# Patient Record
Sex: FEMALE | Race: ASIAN | NOT HISPANIC OR LATINO | Employment: FULL TIME | URBAN - METROPOLITAN AREA
[De-identification: names, ages, dates, MRNs, and addresses within clinical notes are randomized per-mention and may not be internally consistent; named-entity substitution may affect disease eponyms.]

---

## 2017-01-03 ENCOUNTER — ANESTHESIA (OUTPATIENT)
Dept: OBGYN | Facility: CLINIC | Age: 42
End: 2017-01-03
Payer: COMMERCIAL

## 2017-01-03 ENCOUNTER — ANESTHESIA EVENT (OUTPATIENT)
Dept: OBGYN | Facility: CLINIC | Age: 42
End: 2017-01-03
Payer: COMMERCIAL

## 2017-01-03 ENCOUNTER — SURGERY (OUTPATIENT)
Age: 42
End: 2017-01-03

## 2017-01-03 PROBLEM — Z98.891 S/P C-SECTION: Status: ACTIVE | Noted: 2017-01-03

## 2017-01-03 PROBLEM — Z36.89 ENCOUNTER FOR TRIAGE IN PREGNANT PATIENT: Status: ACTIVE | Noted: 2017-01-03

## 2017-01-03 PROCEDURE — 25000125 ZZHC RX 250: Performed by: OBSTETRICS & GYNECOLOGY

## 2017-01-03 PROCEDURE — 25800025 ZZH RX 258: Performed by: OBSTETRICS & GYNECOLOGY

## 2017-01-03 PROCEDURE — 25000125 ZZHC RX 250: Performed by: NURSE ANESTHETIST, CERTIFIED REGISTERED

## 2017-01-03 RX ORDER — KETOROLAC TROMETHAMINE 30 MG/ML
INJECTION, SOLUTION INTRAMUSCULAR; INTRAVENOUS PRN
Status: DISCONTINUED | OUTPATIENT
Start: 2017-01-03 | End: 2017-01-03

## 2017-01-03 RX ORDER — MORPHINE SULFATE 1 MG/ML
INJECTION, SOLUTION EPIDURAL; INTRATHECAL; INTRAVENOUS PRN
Status: DISCONTINUED | OUTPATIENT
Start: 2017-01-03 | End: 2017-01-03

## 2017-01-03 RX ORDER — DEXAMETHASONE SODIUM PHOSPHATE 4 MG/ML
INJECTION, SOLUTION INTRA-ARTICULAR; INTRALESIONAL; INTRAMUSCULAR; INTRAVENOUS; SOFT TISSUE PRN
Status: DISCONTINUED | OUTPATIENT
Start: 2017-01-03 | End: 2017-01-03

## 2017-01-03 RX ORDER — EPHEDRINE SULFATE 50 MG/ML
INJECTION, SOLUTION INTRAVENOUS PRN
Status: DISCONTINUED | OUTPATIENT
Start: 2017-01-03 | End: 2017-01-03

## 2017-01-03 RX ORDER — BUPIVACAINE HYDROCHLORIDE 7.5 MG/ML
INJECTION, SOLUTION INTRASPINAL PRN
Status: DISCONTINUED | OUTPATIENT
Start: 2017-01-03 | End: 2017-01-03

## 2017-01-03 RX ORDER — GLYCOPYRROLATE 0.2 MG/ML
INJECTION, SOLUTION INTRAMUSCULAR; INTRAVENOUS PRN
Status: DISCONTINUED | OUTPATIENT
Start: 2017-01-03 | End: 2017-01-03

## 2017-01-03 RX ORDER — ONDANSETRON 2 MG/ML
INJECTION INTRAMUSCULAR; INTRAVENOUS PRN
Status: DISCONTINUED | OUTPATIENT
Start: 2017-01-03 | End: 2017-01-03

## 2017-01-03 RX ADMIN — SODIUM CHLORIDE 1200 ML: 900 IRRIGANT IRRIGATION at 09:56

## 2017-01-03 RX ADMIN — EPHEDRINE SULFATE 5 MG: 50 INJECTION INTRAMUSCULAR; INTRAVENOUS; SUBCUTANEOUS at 09:24

## 2017-01-03 RX ADMIN — MORPHINE SULFATE 0.2 MG: 1 INJECTION EPIDURAL; INTRATHECAL; INTRAVENOUS at 09:14

## 2017-01-03 RX ADMIN — SODIUM CHLORIDE 300 ML: 900 IRRIGANT IRRIGATION at 10:09

## 2017-01-03 RX ADMIN — SODIUM CHLORIDE, POTASSIUM CHLORIDE, SODIUM LACTATE AND CALCIUM CHLORIDE: 600; 310; 30; 20 INJECTION, SOLUTION INTRAVENOUS at 09:53

## 2017-01-03 RX ADMIN — CEFAZOLIN SODIUM 2 G: 2 INJECTION, SOLUTION INTRAVENOUS at 09:09

## 2017-01-03 RX ADMIN — PHENYLEPHRINE HYDROCHLORIDE 100 MCG: 10 INJECTION, SOLUTION INTRAMUSCULAR; INTRAVENOUS; SUBCUTANEOUS at 09:24

## 2017-01-03 RX ADMIN — KETOROLAC TROMETHAMINE 30 MG: 30 INJECTION, SOLUTION INTRAMUSCULAR at 10:16

## 2017-01-03 RX ADMIN — GLYCOPYRROLATE 0.2 MG: 0.2 INJECTION, SOLUTION INTRAMUSCULAR; INTRAVENOUS at 09:13

## 2017-01-03 RX ADMIN — ONDANSETRON 4 MG: 2 INJECTION INTRAMUSCULAR; INTRAVENOUS at 09:13

## 2017-01-03 RX ADMIN — SODIUM CHLORIDE, POTASSIUM CHLORIDE, SODIUM LACTATE AND CALCIUM CHLORIDE: 600; 310; 30; 20 INJECTION, SOLUTION INTRAVENOUS at 09:09

## 2017-01-03 RX ADMIN — OXYTOCIN-SODIUM CHLORIDE 0.9% IV SOLN 30 UNIT/500ML: 30-0.9/5 SOLUTION at 09:41

## 2017-01-03 RX ADMIN — DEXAMETHASONE SODIUM PHOSPHATE 4 MG: 4 INJECTION, SOLUTION INTRAMUSCULAR; INTRAVENOUS at 09:13

## 2017-01-03 RX ADMIN — BUPIVACAINE HYDROCHLORIDE IN DEXTROSE 13.5 MG: 7.5 INJECTION, SOLUTION SUBARACHNOID at 09:14

## 2017-01-03 ASSESSMENT — ENCOUNTER SYMPTOMS
SEIZURES: 0
DYSRHYTHMIAS: 0
STRIDOR: 0

## 2017-01-03 ASSESSMENT — LIFESTYLE VARIABLES: TOBACCO_USE: 0

## 2017-01-03 ASSESSMENT — COPD QUESTIONNAIRES: COPD: 0

## 2017-01-03 NOTE — ANESTHESIA PROCEDURE NOTES
Peripheral nerve/Neuraxial procedure note : intrathecal  Pre-Procedure  Performed by JOLLY RAWLS  Referred by RADHA  Location: OB      Pre-Anesthestic Checklist: patient identified, IV checked, site marked, risks and benefits discussed, informed consent, monitors and equipment checked, pre-op evaluation and at physician/surgeon's request    Timeout  Correct Patient: Yes   Correct Procedure: Yes   Correct Site: Yes   Correct Laterality: Yes and N/A   Correct Position: Yes   Site Marked: Yes, N/A   .   Procedure Documentation  ASA 2  .    Procedure:    Intrathecal.  Insertion Site:L3-4  (midline approach)      Patient Prep;mask, sterile gloves, povidone-iodine 7.5% surgical scrub, patient draped.  .  Needle: (). # of attempts: 1. # of redirects:. Spinal Needle: Janeth tip 3.5 in.  . . .     Assessment/Narrative  Paresthesias: No.  .  .  clear CSF fluid removed . Time Injected: 09:14

## 2017-01-03 NOTE — ANESTHESIA PREPROCEDURE EVALUATION
PAC NOTE:       ANESTHESIA PRE EVALUATION:  Anesthesia Evaluation     . Pt has had prior anesthetic. Type: Regional    No history of anesthetic complications     ROS/MED HX    ENT/Pulmonary:  - neg pulmonary ROS    (-) tobacco use, asthma, COPD, SANTHOSH risk factors and recent URI   Neurologic:  - neg neurologic ROS    (-) seizures and CVA   Cardiovascular:  - neg cardiovascular ROS      (-) hypertension, CAD, arrhythmias and valvular problems/murmurs   METS/Exercise Tolerance:     Hematologic: Comments: Lab Test        01/03/17     06/07/16     09/03/15      --          01/06/15                       0655          1020          0850           --           1649          WBC           --          8.2          5.0           --          16.5*         HGB          12.8         12.4         13.2           < >        11.7          MCV           --          91           91            --          97            PLT           --          349          367           --          167            < > = values in this interval not displayed.                  Lab Test        10/04/16     09/03/15     01/06/15     06/19/14                       1021          0850          1649          0839          NA            --          139           --           --           POTASSIUM     --          3.6           --           --           CHLORIDE      --          105           --           --           CO2           --          28            --           --           BUN           --          11            --           --           CR            --          0.58         0.91          --           ANIONGAP      --          6             --           --           CAROL           --          8.8           --           --           GLC          74           87            --          83             - neg hematologic  ROS       Musculoskeletal:  - neg musculoskeletal ROS       GI/Hepatic:     (+) GERD Asymptomatic on medication,      (-) hepatitis and  liver disease   Renal/Genitourinary:  - ROS Renal section negative       Endo: Comment: ?thyroid problem in PAST. - neg endo ROS    (-) Type I DM, Type II DM, thyroid disease, chronic steroid usage and obesity   Psychiatric:  - neg psychiatric ROS       Infectious Disease:  - neg infectious disease ROS       Malignancy:      - no malignancy   Other:    (+) Possibly pregnant              Physical Exam  Normal systems: cardiovascular, pulmonary and dental    Airway   Mallampati: II  TM distance: >3 FB  Neck ROM: full    Dental     Cardiovascular   Rhythm and rate: regular and normal  (-) no friction rub, no systolic click and no murmur    Pulmonary    breath sounds clear to auscultation(-) no rhonchi, no decreased breath sounds, no wheezes, no rales and no stridor             Anesthesia Plan      History & Physical Review  History and physical reviewed and following examination; no interval change.    ASA Status:  2 .    NPO Status:  > 8 hours    Plan for Spinal   PONV prophylaxis:  Ondansetron (or other 5HT-3) and Dexamethasone or Solumedrol       Postoperative Care  Postoperative pain management:  IV analgesics.      Consents  Anesthetic plan, risks, benefits and alternatives discussed with:  Patient or representative, Patient and Spouse..                            .

## 2017-01-03 NOTE — ANESTHESIA POSTPROCEDURE EVALUATION
Patient: Katie Wilson     SECTION (N/A Abdomen)  Additional InformationProcedure(s):  Repeat  Section  - Wound Class: I-Clean    Diagnosis:Previous    Diagnosis Additional Information: Previous  in labor     -desires repeat C/S     Anesthesia Type:  Spinal    Note:  Anesthesia Post Evaluation    Patient location during evaluation: Bedside  Patient participation: Able to participate in evaluation but full recovery from regional anesthesia has not yet ocurrred but is anticipated to occur within 48 hours  Level of consciousness: awake  Pain management: adequate  Airway patency: patent  Cardiovascular status: acceptable  Respiratory status: acceptable  Hydration status: acceptable  PONV: controlled     Anesthetic complications: None          Last vitals:  Filed Vitals:    17 0607   BP: 122/73   Temp: 97.9  F (36.6  C)       Electronically Signed By: Arnie Morgan MD  January 3, 2017  10:43 AM

## 2017-01-03 NOTE — ANESTHESIA CARE TRANSFER NOTE
Patient: Katie Wilson     SECTION (N/A Abdomen)  Additional InformationProcedure(s):  Repeat  Section  - Wound Class: I-Clean    Diagnosis: Previous    Diagnosis Additional Information: No value filed.    Anesthesia Type:   Spinal     Note:  Airway :Room Air  Patient transferred to:Labor and Delivery  Comments: VSS, report to RN.      Vitals: (Last set prior to Anesthesia Care Transfer)              Electronically Signed By: CORIE Sheets CRNA  January 3, 2017  10:31 AM

## 2017-01-23 ENCOUNTER — OFFICE VISIT (OUTPATIENT)
Dept: OBGYN | Facility: CLINIC | Age: 42
End: 2017-01-23
Payer: COMMERCIAL

## 2017-01-23 ENCOUNTER — TELEPHONE (OUTPATIENT)
Dept: FAMILY MEDICINE | Facility: CLINIC | Age: 42
End: 2017-01-23

## 2017-01-23 VITALS
TEMPERATURE: 98.2 F | WEIGHT: 124.7 LBS | DIASTOLIC BLOOD PRESSURE: 76 MMHG | HEART RATE: 82 BPM | HEIGHT: 59 IN | OXYGEN SATURATION: 97 % | BODY MASS INDEX: 25.14 KG/M2 | SYSTOLIC BLOOD PRESSURE: 110 MMHG

## 2017-01-23 DIAGNOSIS — Z98.891 S/P CESAREAN SECTION: ICD-10-CM

## 2017-01-23 DIAGNOSIS — K64.4 EXTERNAL HEMORRHOIDS: Primary | ICD-10-CM

## 2017-01-23 PROCEDURE — 99024 POSTOP FOLLOW-UP VISIT: CPT | Performed by: FAMILY MEDICINE

## 2017-01-23 NOTE — PATIENT INSTRUCTIONS
Return in 4 weeks       Dr. Kylie Thomas,     Obstetrics and Gynecology  [facility] [place]

## 2017-01-23 NOTE — NURSING NOTE
"Chief Complaint   Patient presents with     Surgical Followup      pain        Initial /76 mmHg  Pulse 82  Temp(Src) 98.2  F (36.8  C) (Oral)  Ht 4' 11\" (1.499 m)  Wt 124 lb 11.2 oz (56.564 kg)  BMI 25.17 kg/m2  SpO2 97%  LMP 2016  Breastfeeding? Yes Estimated body mass index is 25.17 kg/(m^2) as calculated from the following:    Height as of this encounter: 4' 11\" (1.499 m).    Weight as of this encounter: 124 lb 11.2 oz (56.564 kg).  BP completed using cuff size: regular right arm   KAITLYNN Zuniga      "

## 2017-01-23 NOTE — TELEPHONE ENCOUNTER
" calling-wife present in background answering questions and states she is having pain from  on 1/3/17.   states they were told to call if post-op pain and could be worked in for OV.  Katie is concerned \"something was left in\".  They feel a lump right above right side of incision skin internally.  Is painful when she moves or touches it.  Noticed this 4-5 days ago.  Pain is a 3 when touches and a 1 normally.  Please advise.  Call them back at [phone].  Gretta Flower RN      "

## 2017-01-23 NOTE — MR AVS SNAPSHOT
After Visit Summary   1/23/2017    Katie Wilson    MRN: [id number]           Patient Information     Date Of Birth          1975        Visit Information        Provider Department      1/23/2017 1:15 PM Kylie Thomas, DO [facility]        Today's Diagnoses     External hemorrhoids    -  1       Care Instructions    Return in 4 weeks       Dr. Kylie Thomas, DO    Obstetrics and Gynecology  [facility]               Follow-ups after your visit        Who to contact     If you have questions or need follow up information about today's clinic visit or your schedule please contact [facility] directly at [phone].  Normal or non-critical lab and imaging results will be communicated to you by [employer], letter or phone within 4 business days after the clinic has received the results. If you do not hear from us within 7 days, please contact the clinic through [employer] or phone. If you have a critical or abnormal lab result, we will notify you by phone as soon as possible.  Submit refill requests through [employer] or call your pharmacy and they will forward the refill request to us. Please allow 3 business days for your refill to be completed.          Additional Information About Your Visit        MyChart Information     [employer] gives you secure access to your electronic health record. If you see a primary care provider, you can also send messages to your care team and make appointments. If you have questions, please call your primary care clinic.  If you do not have a primary care provider, please call [phone] and they will assist you.        Care EveryWhere ID     This is your Care EveryWhere ID. This could be used by other organizations to access your [place] medical records  [id number]        Your Vitals Were     Pulse Temperature Height BMI (Body Mass Index) Pulse Oximetry Last Period    82 98.2  F (36.8  C) (Oral) 4'  "11\" (1.499 m) 25.17 kg/m2 97% 04/07/2016    Breastfeeding?                   Yes            Blood Pressure from Last 3 Encounters:   01/23/17 110/76   01/06/17 119/75   12/20/16 108/62    Weight from Last 3 Encounters:   01/23/17 124 lb 11.2 oz (56.564 kg)   12/20/16 135 lb 9.6 oz (61.508 kg)   12/05/16 135 lb 4.8 oz (61.372 kg)              Today, you had the following     No orders found for display         Today's Medication Changes          These changes are accurate as of: 1/23/17  1:36 PM.  If you have any questions, ask your nurse or doctor.               Start taking these medicines.        Dose/Directions    hydrocortisone 2.5 % cream   Commonly known as:  ANUSOL-HC   Used for:  External hemorrhoids   Started by:  Kylie Thomas, DO        Place rectally 2 times daily   Quantity:  70 g   Refills:  1            Where to get your medicines      These medications were sent to [facility] [address] [place] [id number]    Hours:  24-hours Phone:  [phone]    - hydrocortisone 2.5 % cream             Primary Care Provider Office Phone # Fax #    Ya Bay [phone]       [facility] [address] [place] [address]        Thank you!     Thank you for choosing [facility]  for your care. Our goal is always to provide you with excellent care. Hearing back from our patients is one way we can continue to improve our services. Please take a few minutes to complete the written survey that you may receive in the mail after your visit with us. Thank you!             Your Updated Medication List - Protect others around you: Learn how to safely use, store and throw away your medicines at www.disposemymeds.org.          This list is accurate as of: 1/23/17  1:36 PM.  Always use your most recent med list.                   Brand Name Dispense Instructions " for use    hydrocortisone 2.5 % cream    ANUSOL-HC    70 g    Place rectally 2 times daily       ibuprofen 600 MG tablet    ADVIL/MOTRIN    30 tablet    Take 1 tablet (600 mg) by mouth every 6 hours as needed for other (cramping)       PRENAT VIT-FEPOLY-METHYLFOL-FA PO          senna-docusate 8.6-50 MG per tablet    SENOKOT-S;PERICOLACE    100 tablet    Take 1-2 tablets by mouth 2 times daily

## 2017-01-23 NOTE — PROGRESS NOTES
Subjective: 41 year old female   status post  on 1/3/17, here for incision check.  Doing well, denies fever, significant pain.  Is not taking pain medications.      She feels painful mobile lump on right side of abdomen. Is not taking any pain medications, continues prenatal vitamin and stool softener.     Notes hemorrhoid, requests examination.     This document serves as a record of the services and decisions personally performed and made by Kylie Thomas DO. It was created on her behalf by Deisi Jones, a trained medical scribe. The creation of this document is based the provider's statements to the medical scribe.  Deisi Jones 2017 1:28 PM    Objective:  EXAM:  LMP 2016  Constitutional: healthy, alert and no distress  Gastrointestinal: Abdomen soft, non-tender. Incision intact, no erthema, drainage. Normal healing, ridge of tissue palpable.   Rectum: Large hemorrhoid.    Assessment/Plan: 41 year old female   status post  on 1/3/17.  V67.00C Surgery Follow-Up Examination  (primary encounter diagnosis)  Comment: Patient feeling nerve pain from healing process.   Plan: Add ibuprofen or tylenol for pain management.   Hemorrhoid cream. Discussed surgical options if not reduced.   Return to clinic in 4 weeks for post-partum visit.     The information in this document, created by the medical scribe for me, accurately reflects the services I personally performed and the decisions made by me. I have reviewed and approved this document for accuracy prior to leaving the patient care area.  Dr. Kylie Thomas DO    OB/GYN

## 2017-05-30 ENCOUNTER — OFFICE VISIT (OUTPATIENT)
Dept: INTERNAL MEDICINE | Facility: CLINIC | Age: 42
End: 2017-05-30
Payer: COMMERCIAL

## 2017-05-30 VITALS
TEMPERATURE: 97.9 F | SYSTOLIC BLOOD PRESSURE: 116 MMHG | WEIGHT: 115 LBS | BODY MASS INDEX: 23.23 KG/M2 | OXYGEN SATURATION: 98 % | DIASTOLIC BLOOD PRESSURE: 80 MMHG | HEART RATE: 82 BPM

## 2017-05-30 DIAGNOSIS — L65.9 HAIR LOSS: Primary | ICD-10-CM

## 2017-05-30 LAB
ALBUMIN SERPL-MCNC: 3.9 G/DL (ref 3.4–5)
ALP SERPL-CCNC: 108 U/L (ref 40–150)
ALT SERPL W P-5'-P-CCNC: 29 U/L (ref 0–50)
ANION GAP SERPL CALCULATED.3IONS-SCNC: 6 MMOL/L (ref 3–14)
AST SERPL W P-5'-P-CCNC: 22 U/L (ref 0–45)
BASOPHILS # BLD AUTO: 0 10E9/L (ref 0–0.2)
BASOPHILS NFR BLD AUTO: 0.1 %
BILIRUB SERPL-MCNC: 0.4 MG/DL (ref 0.2–1.3)
BUN SERPL-MCNC: 10 MG/DL (ref 7–30)
CALCIUM SERPL-MCNC: 9.3 MG/DL (ref 8.5–10.1)
CHLORIDE SERPL-SCNC: 106 MMOL/L (ref 94–109)
CO2 SERPL-SCNC: 28 MMOL/L (ref 20–32)
CREAT SERPL-MCNC: 0.54 MG/DL (ref 0.52–1.04)
DIFFERENTIAL METHOD BLD: NORMAL
EOSINOPHIL # BLD AUTO: 0.2 10E9/L (ref 0–0.7)
EOSINOPHIL NFR BLD AUTO: 2.3 %
ERYTHROCYTE [DISTWIDTH] IN BLOOD BY AUTOMATED COUNT: 12 % (ref 10–15)
GFR SERPL CREATININE-BSD FRML MDRD: NORMAL ML/MIN/1.7M2
GLUCOSE SERPL-MCNC: 78 MG/DL (ref 70–99)
HCT VFR BLD AUTO: 38.9 % (ref 35–47)
HGB BLD-MCNC: 12.9 G/DL (ref 11.7–15.7)
LYMPHOCYTES # BLD AUTO: 1.2 10E9/L (ref 0.8–5.3)
LYMPHOCYTES NFR BLD AUTO: 17.8 %
MCH RBC QN AUTO: 30.2 PG (ref 26.5–33)
MCHC RBC AUTO-ENTMCNC: 33.2 G/DL (ref 31.5–36.5)
MCV RBC AUTO: 91 FL (ref 78–100)
MONOCYTES # BLD AUTO: 0.6 10E9/L (ref 0–1.3)
MONOCYTES NFR BLD AUTO: 8.7 %
NEUTROPHILS # BLD AUTO: 5 10E9/L (ref 1.6–8.3)
NEUTROPHILS NFR BLD AUTO: 71.1 %
PLATELET # BLD AUTO: 346 10E9/L (ref 150–450)
POTASSIUM SERPL-SCNC: 4.1 MMOL/L (ref 3.4–5.3)
PROT SERPL-MCNC: 8 G/DL (ref 6.8–8.8)
RBC # BLD AUTO: 4.27 10E12/L (ref 3.8–5.2)
SODIUM SERPL-SCNC: 140 MMOL/L (ref 133–144)
T4 FREE SERPL-MCNC: 0.97 NG/DL (ref 0.76–1.46)
TSH SERPL DL<=0.005 MIU/L-ACNC: 0.36 MU/L (ref 0.4–4)
WBC # BLD AUTO: 7 10E9/L (ref 4–11)

## 2017-05-30 PROCEDURE — 99213 OFFICE O/P EST LOW 20 MIN: CPT | Performed by: FAMILY MEDICINE

## 2017-05-30 PROCEDURE — 84439 ASSAY OF FREE THYROXINE: CPT | Performed by: FAMILY MEDICINE

## 2017-05-30 PROCEDURE — 80050 GENERAL HEALTH PANEL: CPT | Performed by: FAMILY MEDICINE

## 2017-05-30 PROCEDURE — 36415 COLL VENOUS BLD VENIPUNCTURE: CPT | Performed by: FAMILY MEDICINE

## 2017-05-30 NOTE — PROGRESS NOTES
CHIEF COMPLAINT    Hair loss.      HISTORY    Katie has notices excessive hair loss for about a month. This can occur from all over the scalp but seems to be thinning more in front bilaterally.    She is about 5 months post partum after delivery of child number 2. She did not have hair loss after baby #1.    There is mention in chart of a thyroid disorder.    Her only medication is Vitamin.    She is not a smoker.    Patient Active Problem List   Diagnosis     Lump of right breast     Thyroid disorder     Herpes simplex virus (HSV) infection     History of /currently pregnant     AMA (advanced maternal age) multigravida 35+     Encounter for triage in pregnant patient     S/P        Current Outpatient Prescriptions   Medication Sig Dispense Refill     PRENAT VIT-FEPOLY-METHYLFOL-FA PO        hydrocortisone (ANUSOL-HC) 2.5 % cream Place rectally 2 times daily (Patient not taking: Reported on 2017) 70 g 1     ibuprofen (ADVIL/MOTRIN) 600 MG tablet Take 1 tablet (600 mg) by mouth every 6 hours as needed for other (cramping) (Patient not taking: Reported on 2017) 30 tablet 0     senna-docusate (SENOKOT-S;PERICOLACE) 8.6-50 MG per tablet Take 1-2 tablets by mouth 2 times daily (Patient not taking: Reported on 2017) 100 tablet        REVIEW OF SYSTEMS    No wt gain  No heat or cold intolerance.  No skin rash      Past Medical History:   Diagnosis Date     Abnormal Pap smear      Herpes simplex without mention of complication      Thyroid disorder     as a child unknown if hyper or hypo       EXAM  /80 (BP Location: Right arm, Patient Position: Chair, Cuff Size: Adult Regular)  Pulse 82  Temp 97.9  F (36.6  C) (Oral)  Wt 115 lb (52.2 kg)  LMP 2017  SpO2 98%  Breastfeeding? Yes  BMI 23.23 kg/m2    She has loose strand just running fingers through hair.  Hair thinning in bifrontal regions.  Neck: no thyromegaly      (L65.9) Hair loss  (primary encounter  diagnosis)  Comment:   Plan: CBC with platelets and differential,         Comprehensive metabolic panel (BMP + Alb, Alk         Phos, ALT, AST, Total. Bili, TP), TSH with free        T4 reflex, DERMATOLOGY REFERRAL

## 2017-05-30 NOTE — MR AVS SNAPSHOT
After Visit Summary   5/30/2017    Katie Wilson    MRN: [id number]           Patient Information     Date Of Birth          1975        Visit Information        Provider Department      5/30/2017 9:20 AM Julio César Leon MD [facility]        Today's Diagnoses     Hair loss    -  1       Follow-ups after your visit        Additional Services     DERMATOLOGY REFERRAL       Your provider has referred you to: [facility] Horne [phone]  [email or web address]    Please be aware that coverage of these services is subject to the terms and limitations of your health insurance plan.  Call member services at your health plan with any benefit or coverage questions.      Please bring the following with you to your appointment:    (1) Any X-Rays, CTs or MRIs which have been performed.  Contact the facility where they were done to arrange for  prior to your scheduled appointment.    (2) List of current medications  (3) This referral request   (4) Any documents/labs given to you for this referral                  Who to contact     If you have questions or need follow up information about today's clinic visit or your schedule please contact [facility] directly at [phone].  Normal or non-critical lab and imaging results will be communicated to you by MyChart, letter or phone within 4 business days after the clinic has received the results. If you do not hear from us within 7 days, please contact the clinic through MyChart or phone. If you have a critical or abnormal lab result, we will notify you by phone as soon as possible.  Submit refill requests through [employer] or call your pharmacy and they will forward the refill request to us. Please allow 3 business days for your refill to be completed.          Additional Information About Your Visit        MyChart Information     [employer] gives you secure access to  your electronic health record. If you see a primary care provider, you can also send messages to your care team and make appointments. If you have questions, please call your primary care clinic.  If you do not have a primary care provider, please call [phone] and they will assist you.        Care EveryWhere ID     This is your Care EveryWhere ID. This could be used by other organizations to access your [place] medical records  [id number]        Your Vitals Were     Pulse Temperature Last Period Pulse Oximetry Breastfeeding? BMI (Body Mass Index)    82 97.9  F (36.6  C) (Oral) 04/19/2017 98% Yes 23.23 kg/m2       Blood Pressure from Last 3 Encounters:   05/30/17 116/80   01/23/17 110/76   01/06/17 119/75    Weight from Last 3 Encounters:   05/30/17 115 lb (52.2 kg)   01/23/17 124 lb 11.2 oz (56.6 kg)   12/20/16 135 lb 9.6 oz (61.5 kg)              We Performed the Following     CBC with platelets and differential     Comprehensive metabolic panel (BMP + Alb, Alk Phos, ALT, AST, Total. Bili, TP)     DERMATOLOGY REFERRAL     TSH with free T4 reflex        Primary Care Provider Office Phone # Fax #    Ya [facility] [phone]       [facility] [address] [facility] [address]        Thank you!     Thank you for choosing [facility]  for your care. Our goal is always to provide you with excellent care. Hearing back from our patients is one way we can continue to improve our services. Please take a few minutes to complete the written survey that you may receive in the mail after your visit with us. Thank you!             Your Updated Medication List - Protect others around you: Learn how to safely use, store and throw away your medicines at www.disposemymeds.org.          This list is accurate as of: 5/30/17  9:58 AM.  Always use your most recent med list.                   Brand Name Dispense Instructions for use    hydrocortisone 2.5 % cream     ANUSOL-HC    70 g    Place rectally 2 times daily       ibuprofen 600 MG tablet    ADVIL/MOTRIN    30 tablet    Take 1 tablet (600 mg) by mouth every 6 hours as needed for other (cramping)       PRENAT VIT-FEPOLY-METHYLFOL-FA PO          senna-docusate 8.6-50 MG per tablet    SENOKOT-S;PERICOLACE    100 tablet    Take 1-2 tablets by mouth 2 times daily

## 2017-05-30 NOTE — NURSING NOTE
"Chief Complaint   Patient presents with     Hair Loss     x's 1mo. Happened after haircut       Initial There were no vitals taken for this visit. Estimated body mass index is 25.19 kg/(m^2) as calculated from the following:    Height as of 1/23/17: 4' 11\" (1.499 m).    Weight as of 1/23/17: 124 lb 11.2 oz (56.6 kg).  Medication Reconciliation: celine Du CMA      "

## 2017-06-23 ENCOUNTER — TRANSFERRED RECORDS (OUTPATIENT)
Dept: HEALTH INFORMATION MANAGEMENT | Facility: CLINIC | Age: 42
End: 2017-06-23

## 2017-07-19 ENCOUNTER — NURSE TRIAGE (OUTPATIENT)
Dept: NURSING | Facility: CLINIC | Age: 42
End: 2017-07-19

## 2017-07-20 NOTE — TELEPHONE ENCOUNTER
Reason for Disposition    Caller has URGENT medication question about med that PCP prescribed and triager unable to answer question    Protocols used: MEDICATION QUESTION CALL-ADULT-AH  Having dental surgery and needs to know if medications they will use are ok for her nursing infant. Paged Dr. KRYS Philippe via answering service to [phone].  Glory Lundberg RN  [facility]

## 2017-09-01 ENCOUNTER — TELEPHONE (OUTPATIENT)
Dept: FAMILY MEDICINE | Facility: CLINIC | Age: 42
End: 2017-09-01

## 2017-09-01 NOTE — TELEPHONE ENCOUNTER
Panel Management Review      Patient has the following on her problem list: None      Composite cancer screening  Chart review shows that this patient is due/due soon for the following Pap Smear  Summary:    Patient is due/failing the following:   PAP    Action needed:   Patient needs office visit for pap.    Type of outreach:    Phone, left message for patient to call back.     Questions for provider review:    None                                                                                                                                    KAITLYNN Zuniga       Chart routed to  .

## 2017-09-11 NOTE — TELEPHONE ENCOUNTER
2nd attempt. Spoke to pt and she is going to call back and schedule after talking with ??.  Deisi Dai SARAHY

## 2017-11-17 ENCOUNTER — OFFICE VISIT (OUTPATIENT)
Dept: INTERNAL MEDICINE | Facility: CLINIC | Age: 42
End: 2017-11-17
Payer: COMMERCIAL

## 2017-11-17 VITALS
OXYGEN SATURATION: 97 % | HEART RATE: 77 BPM | TEMPERATURE: 98.3 F | HEIGHT: 59 IN | WEIGHT: 119 LBS | BODY MASS INDEX: 23.99 KG/M2 | DIASTOLIC BLOOD PRESSURE: 68 MMHG | SYSTOLIC BLOOD PRESSURE: 110 MMHG

## 2017-11-17 DIAGNOSIS — Z00.00 ENCOUNTER FOR ROUTINE ADULT HEALTH EXAMINATION WITHOUT ABNORMAL FINDINGS: Primary | ICD-10-CM

## 2017-11-17 LAB
BASOPHILS # BLD AUTO: 0 10E9/L (ref 0–0.2)
BASOPHILS NFR BLD AUTO: 0.2 %
DIFFERENTIAL METHOD BLD: NORMAL
EOSINOPHIL # BLD AUTO: 0.4 10E9/L (ref 0–0.7)
EOSINOPHIL NFR BLD AUTO: 6.4 %
ERYTHROCYTE [DISTWIDTH] IN BLOOD BY AUTOMATED COUNT: 12 % (ref 10–15)
HCT VFR BLD AUTO: 40.1 % (ref 35–47)
HGB BLD-MCNC: 13.4 G/DL (ref 11.7–15.7)
LYMPHOCYTES # BLD AUTO: 1.3 10E9/L (ref 0.8–5.3)
LYMPHOCYTES NFR BLD AUTO: 21.6 %
MCH RBC QN AUTO: 30.7 PG (ref 26.5–33)
MCHC RBC AUTO-ENTMCNC: 33.4 G/DL (ref 31.5–36.5)
MCV RBC AUTO: 92 FL (ref 78–100)
MONOCYTES # BLD AUTO: 0.4 10E9/L (ref 0–1.3)
MONOCYTES NFR BLD AUTO: 6.4 %
NEUTROPHILS # BLD AUTO: 4 10E9/L (ref 1.6–8.3)
NEUTROPHILS NFR BLD AUTO: 65.4 %
PLATELET # BLD AUTO: 365 10E9/L (ref 150–450)
RBC # BLD AUTO: 4.36 10E12/L (ref 3.8–5.2)
WBC # BLD AUTO: 6.1 10E9/L (ref 4–11)

## 2017-11-17 PROCEDURE — 87624 HPV HI-RISK TYP POOLED RSLT: CPT | Performed by: INTERNAL MEDICINE

## 2017-11-17 PROCEDURE — 80061 LIPID PANEL: CPT | Performed by: INTERNAL MEDICINE

## 2017-11-17 PROCEDURE — G0145 SCR C/V CYTO,THINLAYER,RESCR: HCPCS | Performed by: INTERNAL MEDICINE

## 2017-11-17 PROCEDURE — 99396 PREV VISIT EST AGE 40-64: CPT | Performed by: INTERNAL MEDICINE

## 2017-11-17 PROCEDURE — 36415 COLL VENOUS BLD VENIPUNCTURE: CPT | Performed by: INTERNAL MEDICINE

## 2017-11-17 PROCEDURE — 80050 GENERAL HEALTH PANEL: CPT | Performed by: INTERNAL MEDICINE

## 2017-11-17 NOTE — NURSING NOTE
"Chief Complaint   Patient presents with     Physical     PAP-on 4th day of period     Forms     biometric       Initial /68 (BP Location: Left arm, Cuff Size: Adult Regular)  Pulse 77  Temp 98.3  F (36.8  C) (Oral)  Ht 4' 11\" (1.499 m)  Wt 119 lb (54 kg)  LMP 11/14/2017  SpO2 97%  Breastfeeding? No  BMI 24.04 kg/m2 Estimated body mass index is 24.04 kg/(m^2) as calculated from the following:    Height as of this encounter: 4' 11\" (1.499 m).    Weight as of this encounter: 119 lb (54 kg).  Medication Reconciliation: complete   Maryann Mark CMA      "

## 2017-11-17 NOTE — PROGRESS NOTES
SUBJECTIVE:   CC: Katie Wilson is an 42 year old woman who presents for preventive health visit.     Physical   Annual:     Getting at least 3 servings of Calcium per day::  Yes    Bi-annual eye exam::  Yes    Dental care twice a year::  Yes    Sleep apnea or symptoms of sleep apnea::  None    Diet::  Regular (no restrictions)    Frequency of exercise::  None    Taking medications regularly::  Yes    Medication side effects::  None    Additional concerns today::  No          Today's PHQ-2 Score: PHQ-2 ( 1999 Pf[employer]) 11/15/2017   Q1: Little interest or pleasure in doing things 0   Q2: Feeling down, depressed or hopeless 1   PHQ-2 Score 1   Q1: Little interest or pleasure in doing things Not at all   Q2: Feeling down, depressed or hopeless Several days   PHQ-2 Score 1       Abuse: Current or Past(Physical, Sexual or Emotional)- No  Do you feel safe in your environment - Yes    Social History   Substance Use Topics     Smoking status: Never Smoker     Smokeless tobacco: Never Used     Alcohol use No     The patient does not drink >3 drinks per day nor >7 drinks per week.    Reviewed orders with patient.  Reviewed health maintenance and updated orders accordingly - Yes  Labs reviewed in EPIC    Patient under age 50, mutual decision reflected in health maintenance.        Pertinent mammograms are reviewed under the imaging tab.  History of abnormal Pap smear: NO - age 30- 65 PAP every 3 years recommended    [facility]         Reviewed and updated as needed this visit by Provider          Review of Systems  C: NEGATIVE for fever, chills, change in weight  I: NEGATIVE for worrisome rashes, moles or lesions  E: NEGATIVE for vision changes or irritation  ENT: NEGATIVE for ear, mouth and throat problems  R: NEGATIVE for significant cough or SOB  B: NEGATIVE for masses, tenderness or discharge  CV: NEGATIVE for chest pain, palpitations or peripheral  "edema  GI: NEGATIVE for nausea, abdominal pain, heartburn, or change in bowel habits  : NEGATIVE for unusual urinary or vaginal symptoms. Periods are regular.  M: NEGATIVE for significant arthralgias or myalgia  N: NEGATIVE for weakness, dizziness or paresthesias  P: NEGATIVE for changes in mood or affect     OBJECTIVE:   There were no vitals taken for this visit.   /68 (BP Location: Left arm, Cuff Size: Adult Regular)  Pulse 77  Temp 98.3  F (36.8  C) (Oral)  Ht 4' 11\" (1.499 m)  Wt 119 lb (54 kg)  LMP 11/14/2017  SpO2 97%  Breastfeeding? No  BMI 24.04 kg/m2    Physical Exam  GENERAL: healthy, alert and no distress  EYES: Eyes grossly normal to inspection, PERRL and conjunctivae and sclerae normal  HENT: ear canals and TM's normal, nose and mouth without ulcers or lesions  NECK: no adenopathy, no asymmetry, masses, or scars and thyroid normal to palpation  RESP: lungs clear to auscultation - no rales, rhonchi or wheezes  BREAST: normal without masses, tenderness or nipple discharge and no palpable axillary masses or adenopathy  CV: regular rate and rhythm, normal S1 S2, no S3 or S4, no murmur, click or rub, no peripheral edema and peripheral pulses strong  ABDOMEN: soft, nontender, no hepatosplenomegaly, no masses and bowel sounds normal  Pelvic exam: normal vagina and vulva, normal cervix without lesions or tenderness, uterus normal size anteverted, adenxa normal in size without tenderness, pap smear done  MS: no gross musculoskeletal defects noted, no edema  SKIN: no suspicious lesions or rashes  NEURO: Normal strength and tone, mentation intact and speech normal  PSYCH: mentation appears normal, affect normal/bright    ASSESSMENT/PLAN:       ICD-10-CM    1. Encounter for routine adult health examination without abnormal findings Z00.00 Comprehensive metabolic panel     CBC with platelets differential     TSH with free T4 reflex     Lipid panel reflex to direct LDL Fasting     Pap imaged thin " "layer screen with HPV - recommended age 30 - 65 years (select HPV order below)     HPV High Risk Types DNA Cervical       COUNSELING:  Reviewed preventive health counseling, as reflected in patient instructions         reports that she has never smoked. She has never used smokeless tobacco.    Estimated body mass index is 23.23 kg/(m^2) as calculated from the following:    Height as of 1/23/17: 4' 11\" (1.499 m).    Weight as of 5/30/17: 115 lb (52.2 kg).         Counseling Resources:  ATP IV Guidelines  Pooled Cohorts Equation Calculator  Breast Cancer Risk Calculator  FRAX Risk Assessment  ICSI Preventive Guidelines  Dietary Guidelines for Americans, 2010  USDA's MyPlate  ASA Prophylaxis  Lung CA Screening    Ya Bay MD  [facility] for HPI/ROS submitted by the patient on 11/15/2017   PHQ-2 Score: 1    "

## 2017-11-17 NOTE — MR AVS SNAPSHOT
"              After Visit Summary   11/17/2017    Katie Wilson    MRN: [id number]           Patient Information     Date Of Birth          1975        Visit Information        Provider Department      11/17/2017 8:40 AM Ya Bay MD [facility]        Today's Diagnoses     Encounter for routine adult health examination without abnormal findings    -  1       Follow-ups after your visit        Who to contact     If you have questions or need follow up information about today's clinic visit or your schedule please contact [facility] directly at [phone].  Normal or non-critical lab and imaging results will be communicated to you by [employer], letter or phone within 4 business days after the clinic has received the results. If you do not hear from us within 7 days, please contact the clinic through [employer] or phone. If you have a critical or abnormal lab result, we will notify you by phone as soon as possible.  Submit refill requests through [employer] or call your pharmacy and they will forward the refill request to us. Please allow 3 business days for your refill to be completed.          Additional Information About Your Visit        MyChart Information     [employer] gives you secure access to your electronic health record. If you see a primary care provider, you can also send messages to your care team and make appointments. If you have questions, please call your primary care clinic.  If you do not have a primary care provider, please call [phone] and they will assist you.        Care EveryWhere ID     This is your Care EveryWhere ID. This could be used by other organizations to access your [place] medical records  [id number]        Your Vitals Were     Pulse Temperature Height Last Period Pulse Oximetry Breastfeeding?    77 98.3  F (36.8  C) (Oral) 4' 11\" (1.499 m) 11/14/2017 97% No    BMI (Body Mass Index)                   24.04 kg/m2            Blood " Pressure from Last 3 Encounters:   11/17/17 110/68   05/30/17 116/80   01/23/17 110/76    Weight from Last 3 Encounters:   11/17/17 119 lb (54 kg)   05/30/17 115 lb (52.2 kg)   01/23/17 124 lb 11.2 oz (56.6 kg)              We Performed the Following     CBC with platelets differential     Comprehensive metabolic panel     HPV High Risk Types DNA Cervical     Lipid panel reflex to direct LDL Fasting     Pap imaged thin layer screen with HPV - recommended age 30 - 65 years (select HPV order below)     TSH with free T4 reflex        Primary Care Provider Office Phone # Fax #    Ya Bay [phone]       [address] [facility] [address]        Equal Access to Services     GONSALO ROD : Hadii aad ku hadasho Sokate, waaxda luqadaha, qaybta kaalmada adeegyada, demarco diego. So [facility] [phone].    ATENCIÓN: Si habla español, tiene a dunbar disposición servicios gratuitos de asistencia lingüística. Llame al [phone].    We comply with applicable federal civil rights laws and [place] laws. We do not discriminate on the basis of race, color, national origin, age, disability, sex, sexual orientation, or gender identity.            Thank you!     Thank you for choosing [facility]  for your care. Our goal is always to provide you with excellent care. Hearing back from our patients is one way we can continue to improve our services. Please take a few minutes to complete the written survey that you may receive in the mail after your visit with us. Thank you!             Your Updated Medication List - Protect others around you: Learn how to safely use, store and throw away your medicines at www.disposemymeds.org.          This list is accurate as of: 11/17/17  9:24 AM.  Always use your most recent med list.                   Brand Name Dispense Instructions for use Diagnosis    PRENAT VIT-FEPOLY-METHYLFOL-FA PO

## 2017-11-19 LAB
ALBUMIN SERPL-MCNC: 3.8 G/DL (ref 3.4–5)
ALP SERPL-CCNC: 85 U/L (ref 40–150)
ALT SERPL W P-5'-P-CCNC: 19 U/L (ref 0–50)
ANION GAP SERPL CALCULATED.3IONS-SCNC: 5 MMOL/L (ref 3–14)
AST SERPL W P-5'-P-CCNC: 17 U/L (ref 0–45)
BILIRUB SERPL-MCNC: 0.3 MG/DL (ref 0.2–1.3)
BUN SERPL-MCNC: 12 MG/DL (ref 7–30)
CALCIUM SERPL-MCNC: 8.6 MG/DL (ref 8.5–10.1)
CHLORIDE SERPL-SCNC: 106 MMOL/L (ref 94–109)
CHOLEST SERPL-MCNC: 183 MG/DL
CO2 SERPL-SCNC: 28 MMOL/L (ref 20–32)
CREAT SERPL-MCNC: 0.6 MG/DL (ref 0.52–1.04)
GFR SERPL CREATININE-BSD FRML MDRD: >90 ML/MIN/1.7M2
GLUCOSE SERPL-MCNC: 88 MG/DL (ref 70–99)
HDLC SERPL-MCNC: 50 MG/DL
LDLC SERPL CALC-MCNC: 106 MG/DL
NONHDLC SERPL-MCNC: 133 MG/DL
POTASSIUM SERPL-SCNC: 3.9 MMOL/L (ref 3.4–5.3)
PROT SERPL-MCNC: 8.2 G/DL (ref 6.8–8.8)
SODIUM SERPL-SCNC: 139 MMOL/L (ref 133–144)
TRIGL SERPL-MCNC: 137 MG/DL
TSH SERPL DL<=0.005 MIU/L-ACNC: 0.63 MU/L (ref 0.4–4)

## 2017-11-21 LAB
COPATH REPORT: NORMAL
PAP: NORMAL

## 2017-11-24 LAB
FINAL DIAGNOSIS: NORMAL
HPV HR 12 DNA CVX QL NAA+PROBE: NEGATIVE
HPV16 DNA SPEC QL NAA+PROBE: NEGATIVE
HPV18 DNA SPEC QL NAA+PROBE: NEGATIVE
SPECIMEN DESCRIPTION: NORMAL

## 2017-12-05 ENCOUNTER — TELEPHONE (OUTPATIENT)
Dept: FAMILY MEDICINE | Facility: CLINIC | Age: 42
End: 2017-12-05

## 2017-12-05 NOTE — TELEPHONE ENCOUNTER
Panel Management Review      Patient has the following on her problem list: None      Composite cancer screening  Chart review shows that this patient is due/due soon for the following Pap Smear  Summary:    Patient is due/failing the following:   PAP    Action needed:   Patient needs office visit for physical pap.    Type of outreach:    Phone, left message for patient to call back.     Questions for provider review:    None                                                                                                                                    Dario Russo [facility]           Chart routed to Care Team .

## 2017-12-05 NOTE — LETTER
[facility]   [address] [place] [address]  [phone]      December 19, 2017    Katie Wilson  [address] [facility] [address]            Dear Katie,    This letter is to inform you that an appointment is needed for your pap smear.  Please call to schedule an appointment within the next day(s) to discuss. We look forward to serving you with any medical needs or questions.      Sincerely,        Tanya Infante MD/Yasmin Rodriguez

## 2018-05-29 ENCOUNTER — OFFICE VISIT (OUTPATIENT)
Dept: FAMILY MEDICINE | Facility: CLINIC | Age: 43
End: 2018-05-29
Payer: COMMERCIAL

## 2018-05-29 VITALS
OXYGEN SATURATION: 97 % | DIASTOLIC BLOOD PRESSURE: 74 MMHG | HEART RATE: 74 BPM | BODY MASS INDEX: 24.39 KG/M2 | HEIGHT: 59 IN | TEMPERATURE: 98.1 F | RESPIRATION RATE: 16 BRPM | SYSTOLIC BLOOD PRESSURE: 109 MMHG | WEIGHT: 121 LBS

## 2018-05-29 DIAGNOSIS — H10.33 ACUTE BACTERIAL CONJUNCTIVITIS OF BOTH EYES: Primary | ICD-10-CM

## 2018-05-29 PROCEDURE — 99213 OFFICE O/P EST LOW 20 MIN: CPT | Performed by: FAMILY MEDICINE

## 2018-05-29 RX ORDER — SULFACETAMIDE SODIUM 100 MG/ML
1 SOLUTION/ DROPS OPHTHALMIC
Qty: 1 BOTTLE | Refills: 0 | Status: SHIPPED | OUTPATIENT
Start: 2018-05-29 | End: 2018-06-05

## 2018-05-29 NOTE — MR AVS SNAPSHOT
"              After Visit Summary   5/29/2018    Katie Wilson    MRN: [id number]           Patient Information     Date Of Birth          1975        Visit Information        Provider Department      5/29/2018 8:45 AM Parker Almaguer MD [facility]        Today's Diagnoses     Acute bacterial conjunctivitis of both eyes    -  1       Follow-ups after your visit        Follow-up notes from your care team     Return in about 3 days (around 6/1/2018), or if symptoms worsen or fail to improve.      Who to contact     If you have questions or need follow up information about today's clinic visit or your schedule please contact [facility] directly at [phone].  Normal or non-critical lab and imaging results will be communicated to you by MyChart, letter or phone within 4 business days after the clinic has received the results. If you do not hear from us within 7 days, please contact the clinic through [employer]hart or phone. If you have a critical or abnormal lab result, we will notify you by phone as soon as possible.  Submit refill requests through [employer] or call your pharmacy and they will forward the refill request to us. Please allow 3 business days for your refill to be completed.          Additional Information About Your Visit        MyChart Information     [employer] gives you secure access to your electronic health record. If you see a primary care provider, you can also send messages to your care team and make appointments. If you have questions, please call your primary care clinic.  If you do not have a primary care provider, please call [phone] and they will assist you.        Care EveryWhere ID     This is your Care EveryWhere ID. This could be used by other organizations to access your [place] medical records  [id number]        Your Vitals Were     Pulse Temperature Respirations Height Pulse Oximetry BMI (Body Mass Index)    74 98.1  F (36.7  C) (Oral) 16 4' 11\" " (1.499 m) 97% 24.44 kg/m2       Blood Pressure from Last 3 Encounters:   05/29/18 109/74   11/17/17 110/68   05/30/17 116/80    Weight from Last 3 Encounters:   05/29/18 121 lb (54.9 kg)   11/17/17 119 lb (54 kg)   05/30/17 115 lb (52.2 kg)              Today, you had the following     No orders found for display         Today's Medication Changes          These changes are accurate as of 5/29/18  9:05 AM.  If you have any questions, ask your nurse or doctor.               Start taking these medicines.        Dose/Directions    sulfacetamide 10 % ophthalmic solution   Commonly known as:  BLEPH-10   Used for:  Acute bacterial conjunctivitis of both eyes   Started by:  Parker Almaguer MD        Dose:  1 drop   Apply 1 drop to eye every 4 hours (while awake) for 7 days   Quantity:  1 Bottle   Refills:  0            Where to get your medicines      These medications were sent to [facility] [address]  [address] [facility] [address]     Phone:  [phone]     sulfacetamide 10 % ophthalmic solution                Primary Care Provider Office Phone # Fax #    Ya Bubba Bay [phone]       [address]  [facility] [address]        Equal Access to Services     GONSALO ROD AH: Hadii judith ku hadasho Soomaali, waaxda luqadaha, qaybta kaalmada adeegyada, waxay idiin haydeidran brandi diego. So [facility] [phone].    ATENCIÓN: Si habla español, tiene a dunbar disposición servicios gratuitos de asistencia lingüística. Llame al [phone].    We comply with applicable federal civil rights laws and [place] laws. We do not discriminate on the basis of race, color, national origin, age, disability, sex, sexual orientation, or gender identity.            Thank you!     Thank you for choosing [facility]  for your care. Our goal is always to provide you with excellent care. Hearing back from our patients is one way we can continue to  improve our services. Please take a few minutes to complete the written survey that you may receive in the mail after your visit with us. Thank you!             Your Updated Medication List - Protect others around you: Learn how to safely use, store and throw away your medicines at www.disposemymeds.org.          This list is accurate as of 5/29/18  9:05 AM.  Always use your most recent med list.                   Brand Name Dispense Instructions for use Diagnosis    PRENAT VIT-FEPOLY-METHYLFOL-FA PO           sulfacetamide 10 % ophthalmic solution    BLEPH-10    1 Bottle    Apply 1 drop to eye every 4 hours (while awake) for 7 days    Acute bacterial conjunctivitis of both eyes

## 2018-05-29 NOTE — PROGRESS NOTES
"  SUBJECTIVE:   Katie Wilson is a 42 year old female who presents to clinic today for the following health issues:      Eye(s) Problem  Onset: 2 days    Description:   Location: bilateral  Pain: no  Redness: YES    Accompanying Signs & Symptoms:  Discharge/mattering: YES  Swelling: YES  Visual changes: YES  Fever: no  Nasal Congestion: no  Bothered by bright lights: no    History:   Trauma: no   Foreign body exposure: no    Precipitating factors:   Wearing contacts: no    Alleviating factors:  Improved by: none    Therapies Tried and outcome: none.        Problem list and histories reviewed & adjusted, as indicated.  Additional history: as documented    Patient Active Problem List   Diagnosis     Lump of right breast     Thyroid disorder     Herpes simplex virus (HSV) infection     History of /currently pregnant     AMA (advanced maternal age) multigravida 35+     Encounter for triage in pregnant patient     S/P      Past Surgical History:   Procedure Laterality Date      SECTION N/A 2015    Procedure:  SECTION;  Surgeon: Yohana Monroy MD;  Location: [facility] L+D      SECTION N/A 1/3/2017    Procedure:  SECTION;  Surgeon: Jerzy Camacho MD;  Location: [facility] L+D       Social History   Substance Use Topics     Smoking status: Never Smoker     Smokeless tobacco: Never Used     Alcohol use No     Family History   Problem Relation Age of Onset     CEREBROVASCULAR DISEASE Father      Hypertension Mother            Reviewed and updated as needed this visit by clinical staff  Tobacco  Allergies  Meds  Med Hx  Surg Hx  Fam Hx  Soc Hx      Reviewed and updated as needed this visit by Provider         ROS:      OBJECTIVE:     /74 (BP Location: Right arm, Patient Position: Chair, Cuff Size: Adult Regular)  Pulse 74  Temp 98.1  F (36.7  C) (Oral)  Resp 16  Ht 4' 11\" (1.499 m)  Wt 121 lb (54.9 kg)  SpO2 97%  BMI 24.44 kg/m2  Body mass index is 24.44 " kg/(m^2).  GENERAL: healthy, alert and no distress  EYES: PERRL, EOMI and conjunctiva/corneas- conjunctival injection OU        ASSESSMENT/PLAN:             1. Acute bacterial conjunctivitis of both eyes  [place]   - sulfacetamide (BLEPH-10) 10 % ophthalmic solution; Apply 1 drop to eye every 4 hours (while awake) for 7 days  Dispense: 1 Bottle; Refill: 0    Follow up in 5 days if symptoms persist, sooner if symptoms worsen or new ones develops, pt may contact us over the phone for any questions or concerns.      Parker Almaguer MD  [facility]

## 2018-08-30 ENCOUNTER — OFFICE VISIT (OUTPATIENT)
Dept: BEHAVIORAL HEALTH | Facility: CLINIC | Age: 43
End: 2018-08-30
Payer: COMMERCIAL

## 2018-08-30 DIAGNOSIS — F41.1 GAD (GENERALIZED ANXIETY DISORDER): ICD-10-CM

## 2018-08-30 DIAGNOSIS — F33.1 MODERATE EPISODE OF RECURRENT MAJOR DEPRESSIVE DISORDER (H): ICD-10-CM

## 2018-08-30 PROCEDURE — 90791 PSYCH DIAGNOSTIC EVALUATION: CPT | Performed by: MARRIAGE & FAMILY THERAPIST

## 2018-08-30 ASSESSMENT — ANXIETY QUESTIONNAIRES
GAD7 TOTAL SCORE: 9
3. WORRYING TOO MUCH ABOUT DIFFERENT THINGS: NOT AT ALL
7. FEELING AFRAID AS IF SOMETHING AWFUL MIGHT HAPPEN: NOT AT ALL
GAD7 TOTAL SCORE: 9
2. NOT BEING ABLE TO STOP OR CONTROL WORRYING: MORE THAN HALF THE DAYS
1. FEELING NERVOUS, ANXIOUS, OR ON EDGE: NOT AT ALL
4. TROUBLE RELAXING: NEARLY EVERY DAY
5. BEING SO RESTLESS THAT IT IS HARD TO SIT STILL: MORE THAN HALF THE DAYS
7. FEELING AFRAID AS IF SOMETHING AWFUL MIGHT HAPPEN: NOT AT ALL
GAD7 TOTAL SCORE: 9
6. BECOMING EASILY ANNOYED OR IRRITABLE: MORE THAN HALF THE DAYS

## 2018-08-30 ASSESSMENT — PATIENT HEALTH QUESTIONNAIRE - PHQ9
SUM OF ALL RESPONSES TO PHQ QUESTIONS 1-9: 14
10. IF YOU CHECKED OFF ANY PROBLEMS, HOW DIFFICULT HAVE THESE PROBLEMS MADE IT FOR YOU TO DO YOUR WORK, TAKE CARE OF THINGS AT HOME, OR GET ALONG WITH OTHER PEOPLE: SOMEWHAT DIFFICULT
SUM OF ALL RESPONSES TO PHQ QUESTIONS 1-9: 14

## 2018-08-30 NOTE — PROGRESS NOTES
[facility]  Integrated Behavioral Health Services   Diagnostic Assessment      PATIENT'S NAME: Katie Wilson  MRN:   [id number]  :   1975  DATE OF SERVICE: 2018  SERVICE LOCATION: Face to Face in Clinic  Visit Activities: NEW and [facility] Only      Identifying Information:  Patient is a 43 year old year old, [place],  female.  Patient attended the session alone.        Referral:  Patient was referred for an assessment by Dr. Harris at [facility].   Reason for referral: clarify behavioral health diagnosis, determine behavioral health treatment options, assess client readiness and motivation to change, assess client social situation, address the interaction of behavioral and medical issues and consultation on complex comorbid conditions.       Patient's Statement of Presenting Concern:  Patient reports the following reason(s) for seeking an assessment at this time: increased stress with parenting.  Patient stated that her symptoms have resulted in the following functional impairments: childcare / parenting, management of the household and or completion of tasks and relationship(s)      History of Presenting Concern:  Patient reports that these problem(s) began  when she  a man that she never meet and struggling with communication and parenting. She states that she she is still struggling with parenting, marriage and living in a different culture.Patient has attempted to resolve these concerns in the past through: counseling and primary care behavioral health provider. Patient reports that other professional(s) are involved in providing support / services. PCP      Social History:  Patient reported she grew up in [facility]. They were the fourth born of [address] [place] [address].  Patient reported that her childhood was good. She grew up very poor. Father passed away in High School.  Patient reported a history of 1 committed relationships or  marriages. Patient has been  for 4 years. Patient reported having 2 children. Patient identified few stable and meaningful social connections.     Patient lives with  and two children.  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  Work history has worked for [employer]    Patient reported that she has not been involved with the legal system.  Patient's highest education level was college graduate. Patient did not identify any learning problems. There are no ethnic, cultural or Rastafarian factors that may be relevant for therapy.  Patient did not serve in the .       Mental Health History:  Patient reported no family history of mental health issues. Patient has received the following mental health services in the past: counseling. Patient is not currently receiving any mental health services.      Chemical Health History:  Patient reported no family history of chemical health issues. Patient has not received chemical dependency treatment in the past. Patient is not currently receiving any chemical dependency treatment. Patient reports no problems as a result of their drinking / drug use.      Cage-AID score is: 0 Based on Cage-Aid score and clinical interview there  are not indications of drug or alcohol abuse.      Discussed the general effects of drugs and alcohol on health and well-being.      Significant Losses / Trauma / Abuse / Neglect Issues:  There are indications or report of significant loss, trauma, abuse or neglect issues related to: death of of father.    Issues of possible neglect are not present.      Medical History:   Patient Active Problem List   Diagnosis     Lump of right breast     Thyroid disorder     Herpes simplex virus (HSV) infection     History of /currently pregnant     AMA (advanced maternal age) multigravida 35+     Encounter for triage in pregnant patient     S/P        Medication Review:  Current Outpatient Prescriptions   Medication     PRENAT  VIT-FEPOLY-METHYLFOL-FA PO     No current facility-administered medications for this visit.      Facility-Administered Medications Ordered in Other Visits   Medication     bupivacaine (MARCAINE) 0.125 % injection (diluted from stock concentration by MD or CRNA)     lidocaine-EPINEPHrine 1.5 %-[id number] injection       Patient was provided recommendation to follow-up with physician.    Mental Status Assessment:  Appearance:   Appropriate   Eye Contact:   Good   Psychomotor Behavior: Normal   Attitude:   Cooperative   Orientation:   All  Speech   Rate / Production: Normal    Volume:  Normal   Mood:    Anxious  Sad   Affect:    Appropriate   Thought Content:  Clear   Thought Form:  Coherent  Logical   Insight:    Good       Safety Assessment:    Patient denies a history of suicidal ideation, suicide attempts, self-injurious behavior, homicidal ideation, homicidal behavior and and other safety concerns  Patient denies current or recent suicidal ideation or behaviors.  Patient denies current or recent homicidal ideation or behaviors.  Patient denies current or recent self injurious behavior or ideation.  Patient denies other safety concerns.  Patient reports there are firearms in the house. The firearms are secured in a locked space  Protective Factors Children in the home , Sense of responsibility to family and Positive social support   Risk Factors Intense guilt      Plan for Safety and Risk Management:  A safety and risk management plan has not been developed at this time, however patient was encouraged to call [facility] should there be a change in any of these risk factors.      Review of Symptoms:  Depression: Sleep Interest Guilt Energy Concentration Appetite  Maria Luisa:  No symptoms  Psychosis: No symptoms  Anxiety: Worries Nervousness  Panic:  No symptoms  Post Traumatic Stress Disorder: No symptoms  Obsessive Compulsive Disorder: No symptoms  Eating Disorder: No symptoms  Oppositional Defiant Disorder: No  symptoms  ADD / ADHD: No symptoms  Conduct Disorder: No symptoms    Patient's Strengths and Limitations:  Patient identified the following strengths or resources that will help her succeed in counseling: commitment to health and well being, family support and intelligence. Patient identified the following supports: family. Things that may interfere with the patien'ts success in behavioral health services include:lack of social support.    Diagnostic Criteria:  A. Excessive anxiety and worry about a number of events or activities (such as work or school performance).   B. The person finds it difficult to control the worry.  C. Select 3 or more symptoms (required for diagnosis). Only one item is required in children.   - Restlessness or feeling keyed up or on edge.    - Being easily fatigued.    - Difficulty concentrating or mind going blank.    - Irritability.    - Muscle tension.    - Sleep disturbance (difficulty falling or staying asleep, or restless unsatisfying sleep).   D. The focus of the anxiety and worry is not confined to features of an Axis I disorder.  E. The anxiety, worry, or physical symptoms cause clinically significant distress or impairment in social, occupational, or other important areas of functioning.   F. The disturbance is not due to the direct physiological effects of a substance (e.g., a drug of abuse, a medication) or a general medical condition (e.g., hyperthyroidism) and does not occur exclusively during a Mood Disorder, a Psychotic Disorder, or a Pervasive Developmental Disorder.   - Diminished interest or pleasure in all, or almost all, activities.    - Significant weight gainincrease in appetite.    - Decreased sleep.    - Fatigue or loss of energy.    - Feelings of worthlessness or inappropriate and excessive guilt.   B) The symptoms cause clinically significant distress or impairment in social, occupational, or other important areas of functioning  C) The episode is not attributable  to the physiological effects of a substance or to another medical condition  D) The occurence of major depressive episode is not better explained by other thought / psychotic disorders  E) There has never been a manic episode or hypomanic episode      Functional Status:  Patient's symptoms are causing reduced functional status in the following areas: Activities of Daily Living - getting things down  Social / Relational - kids and       DSM5 Diagnoses: (Sustained by DSM5 Criteria Listed Above)  Diagnoses: 296.22 (F32.1)  Major Depressive Disorder, Single Episode, Moderate _ and With anxious distress  300.02 (F41.1) Generalized Anxiety Disorder  Psychosocial & Contextual Factors: relationships, social  WHODAS Score: 22  See Media section of EPIC medical record for completed WHODAS    Preliminary Treatment Plan:    The client reports no currently identified Advent, ethnic or cultural issues relevant to therapy.    Initial Treatment will focus on: Depressed Mood - increase coping skills  Anxiety - increase coping skills.    Chemical dependency recommendations: No indications of CD issues    As a preliminary treatment goal, patient will experience a reduction in depressed mood, will develop more effective coping skills to manage depressive symptoms, will develop healthy cognitive patterns and beliefs and will increase ability to function adaptively and will experience a reduction in anxiety, will develop more effective coping skills to manage anxiety symptoms, will develop healthy cognitive patterns and beliefs and will increase ability to function adaptively.    The focus of initial interventions will be to alleviate anxiety and alleviate depressed mood.    The patient is receiving treatment / structured support from the following professional(s) / service and treatment. Collaboration will be initiated with: primary care physician.    Referral to another professional/service is not indicated at this  time..    A Release of Information is not needed at this time.    Report to child or adult protection services was NA.    ELSA Marte, Behavioral Health Clinician       Answers for HPI/ROS submitted by the patient on 8/30/2018   If you checked off any problems, how difficult have these problems made it for you to do your work, take care of things at home, or get along with other people?: Somewhat difficult  PHQ9 TOTAL SCORE: 14  ELVIN 7 TOTAL SCORE: 9

## 2018-08-30 NOTE — MR AVS SNAPSHOT
After Visit Summary   8/30/2018    Katie Wilson    MRN: [id number]           Patient Information     Date Of Birth          1975        Visit Information        Provider Department      8/30/2018 10:00 AM Kendra Antoine LMFT [facility]        Today's Diagnoses     Moderate episode of recurrent major depressive disorder (H)          Care Instructions    NOAH [place] training online or book at the library   Get timer          Follow-ups after your visit        Your next 10 appointments already scheduled     Sep 07, 2018 10:00 AM CDT   Return Visit with ELSA Marte   [facility] ([facility])    [address]  [facility] [address] [phone]              Who to contact     If you have questions or need follow up information about today's clinic visit or your schedule please contact [facility] directly at [phone].  Normal or non-critical lab and imaging results will be communicated to you by MyChart, letter or phone within 4 business days after the clinic has received the results. If you do not hear from us within 7 days, please contact the clinic through MailMag[employer] or phone. If you have a critical or abnormal lab result, we will notify you by phone as soon as possible.  Submit refill requests through [employer] or call your pharmacy and they will forward the refill request to us. Please allow 3 business days for your refill to be completed.          Additional Information About Your Visit        MyChart Information     [employer] gives you secure access to your electronic health record. If you see a primary care provider, you can also send messages to your care team and make appointments. If you have questions, please call your primary care clinic.  If you do not have a primary care provider, please call [phone] and they will assist you.        Care EveryWhere ID     This is your Care  EveryWhere ID. This could be used by other organizations to access your [facility] medical records  [id number]         Blood Pressure from Last 3 Encounters:   05/29/18 109/74   11/17/17 110/68   05/30/17 116/80    Weight from Last 3 Encounters:   05/29/18 54.9 kg (121 lb)   11/17/17 54 kg (119 lb)   05/30/17 52.2 kg (115 lb)              Today, you had the following     No orders found for display       Primary Care Provider Office Phone # Fax #    [facility] [phone]       [address] [facility] [address]        Equal Access to Services     HOMER ROD : Hadii judith fioreo Sokate, waaxda luqadaha, qaybta kaalmada adeshrutida, demarco huffman . So [facility] [phone].    ATENCIÓN: Si habla español, tiene a dunbar disposición servicios gratuitos de asistencia lingüística. Llame al [phone].    We comply with applicable federal civil rights laws and [place] laws. We do not discriminate on the basis of race, color, national origin, age, disability, sex, sexual orientation, or gender identity.            Thank you!     Thank you for choosing [facility]  for your care. Our goal is always to provide you with excellent care. Hearing back from our patients is one way we can continue to improve our services. Please take a few minutes to complete the written survey that you may receive in the mail after your visit with us. Thank you!             Your Updated Medication List - Protect others around you: Learn how to safely use, store and throw away your medicines at www.disposemymeds.org.          This list is accurate as of 8/30/18 10:47 AM.  Always use your most recent med list.                   Brand Name Dispense Instructions for use Diagnosis    PRENAT VIT-FEPOLY-METHYLFOL-FA PO

## 2018-08-31 ASSESSMENT — ANXIETY QUESTIONNAIRES: GAD7 TOTAL SCORE: 9

## 2018-08-31 ASSESSMENT — PATIENT HEALTH QUESTIONNAIRE - PHQ9: SUM OF ALL RESPONSES TO PHQ QUESTIONS 1-9: 14

## 2018-09-07 ENCOUNTER — OFFICE VISIT (OUTPATIENT)
Dept: BEHAVIORAL HEALTH | Facility: CLINIC | Age: 43
End: 2018-09-07
Payer: COMMERCIAL

## 2018-09-07 DIAGNOSIS — F33.1 MODERATE EPISODE OF RECURRENT MAJOR DEPRESSIVE DISORDER (H): ICD-10-CM

## 2018-09-07 DIAGNOSIS — F41.1 GAD (GENERALIZED ANXIETY DISORDER): Primary | ICD-10-CM

## 2018-09-07 PROCEDURE — 90832 PSYTX W PT 30 MINUTES: CPT | Performed by: MARRIAGE & FAMILY THERAPIST

## 2018-09-07 NOTE — PROGRESS NOTES
[facility]  September 7, 2018      Behavioral Health Clinician Progress Note    Patient Name: Katie Wilson           Service Type:  Individual      Service Location:   Face to Face in Clinic     Session Start Time: 9;55  Session End Time: 10;20      Session Length: 16 - 37      Attendees: Client    Visit Activities (Refresh list every visit): [facility] Only    Diagnostic Assessment Date: 8/30/18  Treatment Plan Review Date: 12/7/18  See Flowsheets for today's PHQ-9 and ELVIN-7 results  Previous PHQ-9:   PHQ-9 SCORE 9/23/2014 12/16/2014 8/30/2018   Total Score 0 3 -   Total Score MyChart - - 14 (Moderate depression)   Total Score - - 14     Previous ELVIN-7:   ELVIN-7 SCORE 8/30/2018   Total Score 9 (mild anxiety)   Total Score 9       JOHNNIE LEVEL:  JOHNNIE Score (Last Two) 8/8/2016 8/30/2018   JOHNNIE Raw Score 38 30   Activation Score 52.9 56   JOHNNIE Level 2 3       DATA  Extended Session (60+ minutes): No  Interactive Complexity: No  Crisis: No  [facility] Patient: No    Treatment Objective(s) Addressed in This Session:  Target Behavior(s): parenting    Depressed Mood: Increase interest, engagement, and pleasure in doing things  Decrease frequency and intensity of feeling down, depressed, hopeless  Improve quantity and quality of night time sleep / decrease daytime naps  Feel less tired and more energy during the day   Anxiety: will experience a reduction in anxiety, will develop more effective coping skills to manage anxiety symptoms, will develop healthy cognitive patterns and beliefs and will increase ability to function adaptively    Current Stressors / Issues:  Processed how she feels like as a parent my house is out of control and that she is exhausted. She states that they have started to use counting to help with discipline.  Worked with her on what are the stop behaviors and start behaviors.    Stop doing- sassy-attitude- talking backing  Start- listening, following routine  Progress on Treatment  Objective(s) / Homework:  New Objective established this session - PREPARATION (Decided to change - considering how); Intervened by negotiating a change plan and determining options / strategies for behavior change, identifying triggers, exploring social supports, and working towards setting a date to begin behavior change    Motivational Interviewing    MI Intervention: Expressed Empathy/Understanding, Supported Autonomy, Collaboration, Evocation, Permission to raise concern or advise, Open-ended questions and Reflections: simple and complex     Change Talk Expressed by the Patient: Activation    Provider Response to Change Talk: E - Evoked more info from patient about behavior change      Situation        Automatic Thoughts  Cognitive Distortions      Feelings        Behavior        Questioning Thoughts              Care Plan review completed: Yes    Medication Review:  No current psychiatric medications prescribed    Medication Compliance:  NA    Changes in Health Issues:   None reported    Chemical Use Review:   Substance Use: Chemical use reviewed, no active concerns identified      Tobacco Use: No current tobacco use.      Assessment: Current Emotional / Mental Status (status of significant symptoms):  Risk status (Self / Other harm or suicidal ideation)  Patient denies a history of suicidal ideation, suicide attempts, self-injurious behavior, homicidal ideation, homicidal behavior and and other safety concerns  Patient denies current fears or concerns for personal safety.  Patient denies current or recent suicidal ideation or behaviors.  Patient denies current or recent homicidal ideation or behaviors.  Patient denies current or recent self injurious behavior or ideation.  Patient denies other safety concerns.  A safety and risk management plan has not been developed at this time, however patient was encouraged to call [facility] / [facility] should there be a change in any of these risk  factors.    Appearance:   Appropriate   Eye Contact:   Good   Psychomotor Behavior: Normal   Attitude:   Cooperative   Orientation:   All  Speech   Rate / Production: Normal    Volume:  Normal   Mood:    Normal  Affect:    Appropriate   Thought Content:  Clear   Thought Form:  Coherent  Logical   Insight:    Good     Diagnoses:  1. ELVIN (generalized anxiety disorder)    2. Moderate episode of recurrent major depressive disorder (H)        Collateral Reports Completed:  Routed note to Care Team Member(s)    Plan: (Homework, other):  Patient was given information about behavioral services and encouraged to schedule a follow up appointment with the clinic [facility] in 1 week.  She was also given information about mental health symptoms and treatment options .  CD Recommendations: No indications of CD issues.  ELSA Hillman, [facility]      ______________________________________________________________________    Integrated Primary Care Behavioral Health Treatment Plan    Patient's Name: Katie Wilson  YOB: 1975    Date: 9/7/18    DSM-V Diagnoses: 296.32 (F33.1) Major Depressive Disorder, Recurrent Episode, Moderate _ and With anxious distress or 300.02 (F41.1) Generalized Anxiety Disorder  Psychosocial / Contextual Factors: parenting relationships  WHODAS: 22    Referral / Collaboration:  Referral to another professional/service is not indicated at this time..    Anticipated number of session or this episode of care: 6-8      MeasurableTreatment Goal(s) related to diagnosis / functional impairment(s)  Goal 1: Patient will increase her coping skills for anxiety and depression    I will know I've met my goal when less stressed.      Objective #A (Patient Action)    Patient will identify 2 fears / thoughts that contribute to feeling anxious.  Status: New - Date: 9/7/18     Intervention(s)  [facility] will assign homework 1-2-3 magic.    Objective #B  Patient will increase confidence in parenting.  Status: New - Date:  9/7/18     Intervention(s)  [facility] will assign homework 1,2,3 magic.      Patient has reviewed and agreed to the above plan.      Kendra Antoine, LMFT  September 7, 2018

## 2018-09-07 NOTE — PATIENT INSTRUCTIONS
Talk to lilibeth about picking two start and stop behaviors. We discussed talking back(stop) and following routine( start)  You can't change too many things to change at once. Will add more later. Pick two things that you want to work on.

## 2018-09-07 NOTE — MR AVS SNAPSHOT
After Visit Summary   9/7/2018    Katie Wilson    MRN: [id number]           Patient Information     Date Of Birth          1975        Visit Information        Provider Department      9/7/2018 10:00 AM Kendra Antoine LMFT [facility]        Today's Diagnoses     ELVIN (generalized anxiety disorder)    -  1    Moderate episode of recurrent major depressive disorder (H)          Care Instructions    Talk to lilibeth about picking two start and stop behaviors. We discussed talking back(stop) and following routine( start)  You can't change too many things to change at once. Will add more later. Pick two things that you want to work on.           Follow-ups after your visit        Your next 10 appointments already scheduled     Sep 13, 2018  9:30 AM CDT   [facility] with ELSA Marte   [facility]    [address] [facility] [address]  [facility] [id number] [phone]              Who to contact     If you have questions or need follow up information about today's clinic visit or your schedule please contact [facility] directly at [phone].  Normal or non-critical lab and imaging results will be communicated to you by MyChart, letter or phone within 4 business days after the clinic has received the results. If you do not hear from us within 7 days, please contact the clinic through [employer] or phone. If you have a critical or abnormal lab result, we will notify you by phone as soon as possible.  Submit refill requests through [employer] or call your pharmacy and they will forward the refill request to us. Please allow 3 business days for your refill to be completed.          Additional Information About Your Visit        MyChart Information     [employer] gives you secure access to your electronic health record. If you see a primary care provider, you can also send messages to your care team and make appointments.  If you have questions, please call your primary care clinic.  If you do not have a primary care provider, please call [phone] and they will assist you.        Care EveryWhere ID     This is your Care EveryWhere ID. This could be used by other organizations to access your [place] medical records  [id number]         Blood Pressure from Last 3 Encounters:   05/29/18 109/74   11/17/17 110/68   05/30/17 116/80    Weight from Last 3 Encounters:   05/29/18 54.9 kg (121 lb)   11/17/17 54 kg (119 lb)   05/30/17 52.2 kg (115 lb)              Today, you had the following     No orders found for display       Primary Care Provider Office Phone # Fax #    [place] [phone]       [address] [facility] [address]        Equal Access to Services     [facility]: Hadii aad ku hadasho Soomaali, waaxda luqadaha, qaybta kaalmada adeegyada, waxay torstenin hayaan brandi huffman . So [facility] [phone].    ATENCIÓN: Si habla español, tiene a dunbar disposición servicios gratuitos de asistencia lingüística. Llame al [phone].    We comply with applicable federal civil rights laws and [place] laws. We do not discriminate on the basis of race, color, national origin, age, disability, sex, sexual orientation, or gender identity.            Thank you!     Thank you for choosing [facility]  for your care. Our goal is always to provide you with excellent care. Hearing back from our patients is one way we can continue to improve our services. Please take a few minutes to complete the written survey that you may receive in the mail after your visit with us. Thank you!             Your Updated Medication List - Protect others around you: Learn how to safely use, store and throw away your medicines at www.disposemymeds.org.          This list is accurate as of 9/7/18 10:23 AM.  Always use your most recent med list.                   Brand Name Dispense Instructions for use  Diagnosis    PRENAT VIT-FEPOLY-METHYLFOL-FA PO

## 2018-09-13 ENCOUNTER — OFFICE VISIT (OUTPATIENT)
Dept: BEHAVIORAL HEALTH | Facility: CLINIC | Age: 43
End: 2018-09-13
Payer: COMMERCIAL

## 2018-09-13 DIAGNOSIS — F41.1 GAD (GENERALIZED ANXIETY DISORDER): Primary | ICD-10-CM

## 2018-09-13 DIAGNOSIS — F33.1 MODERATE EPISODE OF RECURRENT MAJOR DEPRESSIVE DISORDER (H): ICD-10-CM

## 2018-09-13 PROCEDURE — 90785 PSYTX COMPLEX INTERACTIVE: CPT | Performed by: MARRIAGE & FAMILY THERAPIST

## 2018-09-13 PROCEDURE — 90832 PSYTX W PT 30 MINUTES: CPT | Performed by: MARRIAGE & FAMILY THERAPIST

## 2018-09-13 NOTE — PROGRESS NOTES
[facility]  September 13, 2018      Behavioral Health Clinician Progress Note    Patient Name: Katie Wilson           Service Type:  Individual      Service Location:   Face to Face in Clinic     Session Start Time: 9;27  Session End Time: 10;05      Session Length: 38 - 52      Attendees: Client    Visit Activities (Refresh list every visit): [facility] Only    Diagnostic Assessment Date: 8/30/18  Treatment Plan Review Date: 12/7/18  See Flowsheets for today's PHQ-9 and ELVIN-7 results  Previous PHQ-9:   PHQ-9 SCORE 9/23/2014 12/16/2014 8/30/2018   Total Score 0 3 -   Total Score MyChart - - 14 (Moderate depression)   Total Score - - 14     Previous ELVIN-7:   ELVIN-7 SCORE 8/30/2018   Total Score 9 (mild anxiety)   Total Score 9       JOHNNIE LEVEL:  JOHNNIE Score (Last Two) 8/8/2016 8/30/2018   JOHNNIE Raw Score 38 30   Activation Score 52.9 56   JOHNNIE Level 2 3       DATA  Extended Session (60+ minutes): No  Interactive Complexity: Yes, visit entailed Interactive Complexity evidenced by:  -The need to manage maladaptive communication (related to, e.g., high anxiety, high reactivity, repeated questions, or disagreement) among participants that complicates delivery of care  Crisis: No  [facility] Patient: No    Treatment Objective(s) Addressed in This Session:  Target Behavior(s): parenting    Depressed Mood: Increase interest, engagement, and pleasure in doing things  Decrease frequency and intensity of feeling down, depressed, hopeless  Improve quantity and quality of night time sleep / decrease daytime naps  Feel less tired and more energy during the day   Anxiety: will experience a reduction in anxiety, will develop more effective coping skills to manage anxiety symptoms, will develop healthy cognitive patterns and beliefs and will increase ability to function adaptively    Current Stressors / Issues:   came in to talk about 1,2,3 magic and it started a fight between them about difference they have in  the marriage. Pt mentioned that she wants a divorce. Referred them to marriage therapy.  left processed her feeling like she is not being heard she agreed to let me put an order in for martial therapy.   Will continue to discuss this.   Progress on Treatment Objective(s) / Homework:  New Objective established this session - PREPARATION (Decided to change - considering how); Intervened by negotiating a change plan and determining options / strategies for behavior change, identifying triggers, exploring social supports, and working towards setting a date to begin behavior change    Motivational Interviewing    MI Intervention: Expressed Empathy/Understanding, Supported Autonomy, Collaboration, Evocation, Permission to raise concern or advise, Open-ended questions and Reflections: simple and complex     Change Talk Expressed by the Patient: Activation    Provider Response to Change Talk: E - Evoked more info from patient about behavior change      Situation        Automatic Thoughts  Cognitive Distortions      Feelings        Behavior        Questioning Thoughts              Care Plan review completed: Yes    Medication Review:  No current psychiatric medications prescribed    Medication Compliance:  NA    Changes in Health Issues:   None reported    Chemical Use Review:   Substance Use: Chemical use reviewed, no active concerns identified      Tobacco Use: No current tobacco use.      Assessment: Current Emotional / Mental Status (status of significant symptoms):  Risk status (Self / Other harm or suicidal ideation)  Patient denies a history of suicidal ideation, suicide attempts, self-injurious behavior, homicidal ideation, homicidal behavior and and other safety concerns  Patient denies current fears or concerns for personal safety.  Patient denies current or recent suicidal ideation or behaviors.  Patient denies current or recent homicidal ideation or behaviors.  Patient denies current or recent self  injurious behavior or ideation.  Patient denies other safety concerns.  A safety and risk management plan has not been developed at this time, however patient was encouraged to call [place] [phone] should there be a change in any of these risk factors.    Appearance:   Appropriate   Eye Contact:   Good   Psychomotor Behavior: Normal   Attitude:   Cooperative   Orientation:   All  Speech   Rate / Production: Normal    Volume:  Normal   Mood:    Normal  Affect:    Appropriate   Thought Content:  Clear   Thought Form:  Coherent  Logical   Insight:    Good     Diagnoses:  1. ELVIN (generalized anxiety disorder)    2. Moderate episode of recurrent major depressive disorder (H)        Collateral Reports Completed:  Routed note to Care Team Member(s)    Plan: (Homework, other):  Patient was given information about behavioral services and encouraged to schedule a follow up appointment with the clinic [facility] in 1 week.  She was also given information about mental health symptoms and treatment options .  CD Recommendations: No indications of CD issues.  ELSA Hillman, [facility]      ______________________________________________________________________    Integrated Primary Care Behavioral Health Treatment Plan    Patient's Name: Katie Wilson  YOB: 1975    Date: 9/7/18    DSM-V Diagnoses: 296.32 (F33.1) Major Depressive Disorder, Recurrent Episode, Moderate _ and With anxious distress or 300.02 (F41.1) Generalized Anxiety Disorder  Psychosocial / Contextual Factors: parenting relationships  WHODAS: 22    Referral / Collaboration:  Referral to another professional/service is not indicated at this time..    Anticipated number of session or this episode of care: 6-8      MeasurableTreatment Goal(s) related to diagnosis / functional impairment(s)  Goal 1: Patient will increase her coping skills for anxiety and depression    I will know I've met my goal when less stressed.      Objective #A (Patient  Action)    Patient will identify 2 fears / thoughts that contribute to feeling anxious.  Status: New - Date: 9/7/18     Intervention(s)  [facility] will assign homework 1-2-3 magic.    Objective #B  Patient will increase confidence in parenting.  Status: New - Date: 9/7/18     Intervention(s)  [facility] will assign homework 1,2,3 magic.      Patient has reviewed and agreed to the above plan.      Kendra Antoine, LMFT  September 7, 2018

## 2018-09-13 NOTE — MR AVS SNAPSHOT
After Visit Summary   9/13/2018    Katie Wilson    MRN: [id number]           Patient Information     Date Of Birth          1975        Visit Information        Provider Department      9/13/2018 9:30 AM Kendra Antoine LMFT [facility]        Today's Diagnoses     ELVIN (generalized anxiety disorder)    -  1    Moderate episode of recurrent major depressive disorder (H)           Follow-ups after your visit        Additional Services     MENTAL HEALTH REFERRAL  - Adult; Outpatient Treatment; Individual/Couples/Family/Group Therapy/Health Psychology; [facility]: [facility] [phone]; We will contact you to schedule the appointment or please call with any questions       All scheduling is subject to the client's specific insurance plan & benefits, provider/location availability, and provider clinical specialities.  Please arrive 15 minutes early for your first appointment and bring your completed paperwork.    Please be aware that coverage of these services is subject to the terms and limitations of your health insurance plan.  Call member services at your health plan with any benefit or coverage questions.                            Your next 10 appointments already scheduled     Sep 21, 2018 10:00 AM CDT   [facility] ELSA [facility]e   [facility]    [address]  [facility] [address] [phone]            Sep 25, 2018  8:20 AM CDT   PHYSICAL with Carmen Atkins MD   [facility]    [address]  [facility] [id number] [phone]              Who to contact     If you have questions or need follow up information about today's clinic visit or your schedule please contact [facility] directly at [phone].  Normal or non-critical lab and imaging results will be communicated to you by Otoniel  letter or phone within 4 business days after the clinic has received the results. If you do not hear from us within 7 days, please contact the clinic through [employer] or phone. If you have a critical or abnormal lab result, we will notify you by phone as soon as possible.  Submit refill requests through [employer] or call your pharmacy and they will forward the refill request to us. Please allow 3 business days for your refill to be completed.          Additional Information About Your Visit        [employer] Information     [employer] gives you secure access to your electronic health record. If you see a primary care provider, you can also send messages to your care team and make appointments. If you have questions, please call your primary care clinic.  If you do not have a primary care provider, please call [phone] and they will assist you.        Care EveryWhere ID     This is your Care EveryWhere ID. This could be used by other organizations to access your [place] medical records  [id number]         Blood Pressure from Last 3 Encounters:   05/29/18 109/74   11/17/17 110/68   05/30/17 116/80    Weight from Last 3 Encounters:   05/29/18 54.9 kg (121 lb)   11/17/17 54 kg (119 lb)   05/30/17 52.2 kg (115 lb)              We Performed the Following     C PSYCHOTHERAPY COMPLEX INTERACTIVE     MENTAL HEALTH REFERRAL  - Adult; Outpatient Treatment; Individual/Couples/Family/Group Therapy/Health Psychology; [facility]: [facility] [phone]; We will contact you to schedule the appointment or please call with any questions        Primary Care Provider Office Phone # Fax #    [place] [phone]       [address] [facility] [address]        Equal Access to Services     [facility] : Hadbyron Hernandez, simone almaraz, demarco lopez. So [facility] [phone].    ATENCIÓN: Si habla español, tiene a dunbar disposición  servicios gratuitos de asistencia lingüística. Anna rojo [phone].    We comply with applicable federal civil rights laws and [place] laws. We do not discriminate on the basis of race, color, national origin, age, disability, sex, sexual orientation, or gender identity.            Thank you!     Thank you for choosing [facility]  for your care. Our goal is always to provide you with excellent care. Hearing back from our patients is one way we can continue to improve our services. Please take a few minutes to complete the written survey that you may receive in the mail after your visit with us. Thank you!             Your Updated Medication List - Protect others around you: Learn how to safely use, store and throw away your medicines at www.disposemymeds.org.          This list is accurate as of 9/13/18 11:53 AM.  Always use your most recent med list.                   Brand Name Dispense Instructions for use Diagnosis    PRENAT VIT-FEPOLY-METHYLFOL-FA PO

## 2018-09-25 ENCOUNTER — OFFICE VISIT (OUTPATIENT)
Dept: INTERNAL MEDICINE | Facility: CLINIC | Age: 43
End: 2018-09-25
Payer: COMMERCIAL

## 2018-09-25 VITALS
DIASTOLIC BLOOD PRESSURE: 60 MMHG | HEIGHT: 59 IN | OXYGEN SATURATION: 97 % | SYSTOLIC BLOOD PRESSURE: 94 MMHG | TEMPERATURE: 98 F | RESPIRATION RATE: 12 BRPM | WEIGHT: 125 LBS | HEART RATE: 68 BPM | BODY MASS INDEX: 25.2 KG/M2

## 2018-09-25 DIAGNOSIS — Z23 NEED FOR PROPHYLACTIC VACCINATION AND INOCULATION AGAINST INFLUENZA: ICD-10-CM

## 2018-09-25 DIAGNOSIS — Z00.00 ENCOUNTER FOR ROUTINE ADULT HEALTH EXAMINATION WITHOUT ABNORMAL FINDINGS: Primary | ICD-10-CM

## 2018-09-25 LAB — HGB BLD-MCNC: 12.8 G/DL (ref 11.7–15.7)

## 2018-09-25 PROCEDURE — 84443 ASSAY THYROID STIM HORMONE: CPT | Performed by: INTERNAL MEDICINE

## 2018-09-25 PROCEDURE — 85018 HEMOGLOBIN: CPT | Performed by: INTERNAL MEDICINE

## 2018-09-25 PROCEDURE — 80053 COMPREHEN METABOLIC PANEL: CPT | Performed by: INTERNAL MEDICINE

## 2018-09-25 PROCEDURE — 90471 IMMUNIZATION ADMIN: CPT | Performed by: INTERNAL MEDICINE

## 2018-09-25 PROCEDURE — 36415 COLL VENOUS BLD VENIPUNCTURE: CPT | Performed by: INTERNAL MEDICINE

## 2018-09-25 PROCEDURE — 90686 IIV4 VACC NO PRSV 0.5 ML IM: CPT | Performed by: INTERNAL MEDICINE

## 2018-09-25 PROCEDURE — 80061 LIPID PANEL: CPT | Performed by: INTERNAL MEDICINE

## 2018-09-25 PROCEDURE — 99396 PREV VISIT EST AGE 40-64: CPT | Mod: 25 | Performed by: INTERNAL MEDICINE

## 2018-09-25 ASSESSMENT — PATIENT HEALTH QUESTIONNAIRE - PHQ9
SUM OF ALL RESPONSES TO PHQ QUESTIONS 1-9: 6
SUM OF ALL RESPONSES TO PHQ QUESTIONS 1-9: 6
10. IF YOU CHECKED OFF ANY PROBLEMS, HOW DIFFICULT HAVE THESE PROBLEMS MADE IT FOR YOU TO DO YOUR WORK, TAKE CARE OF THINGS AT HOME, OR GET ALONG WITH OTHER PEOPLE: NOT DIFFICULT AT ALL

## 2018-09-25 NOTE — MR AVS SNAPSHOT
"              After Visit Summary   9/25/2018    Katie Wilson    MRN: [id number]           Patient Information     Date Of Birth          1975        Visit Information        Provider Department      9/25/2018 8:20 AM Carmen Atkins MD [facility]        Today's Diagnoses     Encounter for routine adult health examination without abnormal findings    -  1    Need for prophylactic vaccination and inoculation against influenza           Follow-ups after your visit        Who to contact     If you have questions or need follow up information about today's clinic visit or your schedule please contact [facility] directly at [phone].  Normal or non-critical lab and imaging results will be communicated to you by MyChart, letter or phone within 4 business days after the clinic has received the results. If you do not hear from us within 7 days, please contact the clinic through [employer] or phone. If you have a critical or abnormal lab result, we will notify you by phone as soon as possible.  Submit refill requests through [employer] or call your pharmacy and they will forward the refill request to us. Please allow 3 business days for your refill to be completed.          Additional Information About Your Visit        MyChart Information     [employer] gives you secure access to your electronic health record. If you see a primary care provider, you can also send messages to your care team and make appointments. If you have questions, please call your primary care clinic.  If you do not have a primary care provider, please call [phone] and they will assist you.        Care EveryWhere ID     This is your Care EveryWhere ID. This could be used by other organizations to access your [place] medical records  [id number]        Your Vitals Were     Pulse Temperature Respirations Height Pulse Oximetry BMI (Body Mass Index)    68 98  F (36.7  C) (Oral) 12 4' 11\" (1.499 m) 97% " 25.25 kg/m2       Blood Pressure from Last 3 Encounters:   09/25/18 94/60   05/29/18 109/74   11/17/17 110/68    Weight from Last 3 Encounters:   09/25/18 125 lb (56.7 kg)   05/29/18 121 lb (54.9 kg)   11/17/17 119 lb (54 kg)              We Performed the Following     Comprehensive metabolic panel     FLU VACCINE, SPLIT VIRUS, IM (QUADRIVALENT) [55978]- >3 YRS     Hemoglobin     Lipid panel reflex to direct LDL Fasting     TSH with free T4 reflex     Vaccine Administration, Initial [04125]        Primary Care Provider Office Phone # Fax #    [place] [phone]       [address]  [facility] [address]        Equal Access to Services     [facility] : Mily fioreo Sokate, waaxda luqadaha, qaybta kaalmada adeegyada, demarco huffman . So [facility] [phone].    ATENCIÓN: Si habla español, tiene a dunbar disposición servicios gratuitos de asistencia lingüística. Llame al [phone].    We comply with applicable federal civil rights laws and [place] laws. We do not discriminate on the basis of race, color, national origin, age, disability, sex, sexual orientation, or gender identity.            Thank you!     Thank you for choosing [facility]  for your care. Our goal is always to provide you with excellent care. Hearing back from our patients is one way we can continue to improve our services. Please take a few minutes to complete the written survey that you may receive in the mail after your visit with us. Thank you!             Your Updated Medication List - Protect others around you: Learn how to safely use, store and throw away your medicines at www.disposemymeds.org.          This list is accurate as of 9/25/18 10:35 AM.  Always use your most recent med list.                   Brand Name Dispense Instructions for use Diagnosis    PRENAT VIT-FEPOLY-METHYLFOL-FA PO

## 2018-09-25 NOTE — PROGRESS NOTES

## 2018-09-25 NOTE — PROGRESS NOTES
SUBJECTIVE:   CC: Katie Wilson is an 43 year old woman who presents for preventive health visit.     Physical   Annual:     Getting at least 3 servings of Calcium per day:  Yes    Bi-annual eye exam:  Yes    Dental care twice a year:  Yes    Sleep apnea or symptoms of sleep apnea:  None    Diet:  Regular (no restrictions)    Frequency of exercise:  None    Taking medications regularly:  Yes    Medication side effects:  None    Additional concerns today:  No    Patient complains of something protruding from her vagina when she strains last childbirth.  She has history of 2- C-sections.  No urinary issues      Depression and Anxiety Follow-Up    Status since last visit: Improved ,seeign therapist, not on meds.     Other associated symptoms:None    Complicating factors:     Significant life event: No     Current substance abuse: None    PHQ-9 2018   Total Score 14 6   Q9: Suicide Ideation Not at all Not at all            Today's PHQ-2 Score:   PHQ-2 (  [employer]) 2018   Q1: Little interest or pleasure in doing things 0   Q2: Feeling down, depressed or hopeless 3   PHQ-2 Score 3   Q1: Little interest or pleasure in doing things Not at all   Q2: Feeling down, depressed or hopeless Nearly every day   PHQ-2 Score 3       Abuse: Current or Past(Physical, Sexual or Emotional)- No  Do you feel safe in your environment - Yes    Past Medical History:   Diagnosis Date     Abnormal Pap smear      Herpes simplex without mention of complication      Left breast lump      Lump of right breast      Thyroid disorder     as a child unknown if hyper or hypo       Past Surgical History:   Procedure Laterality Date      SECTION N/A 2015    Procedure:  SECTION;  Surgeon: Yohana Monroy MD;  Location:  [facility]      SECTION N/A 1/3/2017    Procedure:  SECTION;  Surgeon: Jerzy Camacho MD;  Location:  [facility]       Current Outpatient Prescriptions   Medication Sig  "Dispense Refill     PRENAT VIT-FEPOLY-METHYLFOL-FA PO          Family History   Problem Relation Age of Onset     Cerebrovascular Disease Father      Hypertension Mother        Social History   Substance Use Topics     Smoking status: Never Smoker     Smokeless tobacco: Never Used     Alcohol use No     Alcohol Use 9/25/2018   If you drink alcohol do you typically have greater than 3 drinks per day OR greater than 7 drinks per week? No       Reviewed orders with patient.  Reviewed health maintenance and updated orders accordingly - Yes    Mammogram not appropriate for this patient based on age.    Pertinent mammograms are reviewed under the imaging tab.  History of abnormal Pap smear: YES - updated in Problem List and Health Maintenance accordingly  PAP / HPV Latest Ref Rng & Units 11/17/2017 6/17/2014   PAP - NIL NIL   HPV 16 DNA NEG:Negative Negative -   HPV 18 DNA NEG:Negative Negative -   OTHER HR HPV NEG:Negative Negative -     Reviewed and updated as needed this visit by clinical staff         Reviewed and updated as needed this visit by Provider            Review of Systems  CONSTITUTIONAL: NEGATIVE for fever, chills, change in weight  INTEGUMENTARU/SKIN: NEGATIVE for worrisome rashes, moles or lesions  EYES: NEGATIVE for vision changes or irritation  ENT: NEGATIVE for ear, mouth and throat problems  RESP: NEGATIVE for significant cough or SOB  BREAST: NEGATIVE for masses, tenderness or discharge  CV: NEGATIVE for chest pain, palpitations or peripheral edema  GI: NEGATIVE for nausea, abdominal pain, heartburn, or change in bowel habits  : NEGATIVE for unusual urinary or vaginal symptoms. Periods are regular.  MUSCULOSKELETAL: NEGATIVE for significant arthralgias or myalgia  NEURO: NEGATIVE for weakness, dizziness or paresthesias  PSYCHIATRIC: NEGATIVE for changes in mood or affect     OBJECTIVE:   BP 94/60  Pulse 68  Temp 98  F (36.7  C) (Oral)  Resp 12  Ht 4' 11\" (1.499 m)  Wt 125 lb (56.7 kg)  SpO2 " 97%  BMI 25.25 kg/m2  Physical Exam  GENERAL: healthy, alert and no distress  EYES: Eyes grossly normal to inspection, PERRL and conjunctivae and sclerae normal  HENT: ear canals and TM's normal, nose and mouth without ulcers or lesions  NECK: no adenopathy, no asymmetry, masses, or scars and thyroid normal to palpation  RESP: lungs clear to auscultation - no rales, rhonchi or wheezes  BREAST: normal without masses, tenderness or nipple discharge and no palpable axillary masses or adenopathy  CV: regular rate and rhythm, normal S1 S2, no S3 or S4, no murmur, click or rub, no peripheral edema and peripheral pulses strong  ABDOMEN: soft, nontender, no hepatosplenomegaly, no masses and bowel sounds normal   (female): normal female external genitalia, normal urethral meatus, vaginal mucosa pink, moist, well rugated, and cervix slightly descended but still inside the vagina. ,normal adnexa without masses or discharge  MS: no gross musculoskeletal defects noted, no edema  NEURO: Normal strength and tone, mentation intact and speech normal  PSYCH: mentation appears normal, affect normal/bright      ASSESSMENT/PLAN:      (Z00.00) Encounter for routine adult health examination without abnormal findings  (primary encounter diagnosis)  Plan: Hemoglobin, Comprehensive metabolic panel,         Lipid panel reflex to direct LDL Fasting, TSH         with free T4 reflex          Probable cervical descent with straining, explained about the condition , dicussed Kegel's exercise, printed information given pt pt.   F/ with OBGYN for persisting or worsening symptoms.     seeing therapist for anxiety/depression.not on meds at this time.       COUNSELING:  Reviewed preventive health counseling, as reflected in patient instructions       Regular exercise       Healthy diet/nutrition       Immunizations    Vaccinated for: Influenza          BP Readings from Last 1 Encounters:   05/29/18 109/74     Estimated body mass index is 24.44  "kg/(m^2) as calculated from the following:    Height as of 5/29/18: 4' 11\" (1.499 m).    Weight as of 5/29/18: 121 lb (54.9 kg).           reports that she has never smoked. She has never used smokeless tobacco.      Counseling Resources:  ATP IV Guidelines  Pooled Cohorts Equation Calculator  Breast Cancer Risk Calculator  FRAX Risk Assessment  ICSI Preventive Guidelines  Dietary Guidelines for Americans, 2010  [employer]'s MyPlate  ASA Prophylaxis  Lung CA Screening    Carmen Atkins MD  [facility]    Answers for HPI/ROS submitted by the patient on 9/25/2018   PHQ-2 Score: 3  If you checked off any problems, how difficult have these problems made it for you to do your work, take care of things at home, or get along with other people?: Not difficult at all  PHQ9 TOTAL SCORE: 6    "

## 2018-09-26 LAB
ALBUMIN SERPL-MCNC: 4 G/DL (ref 3.4–5)
ALP SERPL-CCNC: 73 U/L (ref 40–150)
ALT SERPL W P-5'-P-CCNC: 20 U/L (ref 0–50)
ANION GAP SERPL CALCULATED.3IONS-SCNC: 9 MMOL/L (ref 3–14)
AST SERPL W P-5'-P-CCNC: 17 U/L (ref 0–45)
BILIRUB SERPL-MCNC: 0.5 MG/DL (ref 0.2–1.3)
BUN SERPL-MCNC: 10 MG/DL (ref 7–30)
CALCIUM SERPL-MCNC: 8.6 MG/DL (ref 8.5–10.1)
CHLORIDE SERPL-SCNC: 103 MMOL/L (ref 94–109)
CHOLEST SERPL-MCNC: 196 MG/DL
CO2 SERPL-SCNC: 25 MMOL/L (ref 20–32)
CREAT SERPL-MCNC: 0.58 MG/DL (ref 0.52–1.04)
GFR SERPL CREATININE-BSD FRML MDRD: >90 ML/MIN/1.7M2
GLUCOSE SERPL-MCNC: 78 MG/DL (ref 70–99)
HDLC SERPL-MCNC: 50 MG/DL
LDLC SERPL CALC-MCNC: 126 MG/DL
NONHDLC SERPL-MCNC: 146 MG/DL
POTASSIUM SERPL-SCNC: 4.1 MMOL/L (ref 3.4–5.3)
PROT SERPL-MCNC: 8.2 G/DL (ref 6.8–8.8)
SODIUM SERPL-SCNC: 137 MMOL/L (ref 133–144)
TRIGL SERPL-MCNC: 100 MG/DL
TSH SERPL DL<=0.005 MIU/L-ACNC: 0.42 MU/L (ref 0.4–4)

## 2018-09-26 ASSESSMENT — PATIENT HEALTH QUESTIONNAIRE - PHQ9: SUM OF ALL RESPONSES TO PHQ QUESTIONS 1-9: 6

## 2019-11-06 ENCOUNTER — OFFICE VISIT (OUTPATIENT)
Dept: INTERNAL MEDICINE | Facility: CLINIC | Age: 44
End: 2019-11-06
Payer: COMMERCIAL

## 2019-11-06 VITALS
TEMPERATURE: 97.9 F | HEIGHT: 60 IN | RESPIRATION RATE: 16 BRPM | WEIGHT: 138.6 LBS | BODY MASS INDEX: 27.21 KG/M2 | SYSTOLIC BLOOD PRESSURE: 96 MMHG | OXYGEN SATURATION: 97 % | DIASTOLIC BLOOD PRESSURE: 72 MMHG | HEART RATE: 88 BPM

## 2019-11-06 DIAGNOSIS — Z23 NEED FOR PROPHYLACTIC VACCINATION AND INOCULATION AGAINST INFLUENZA: ICD-10-CM

## 2019-11-06 DIAGNOSIS — Z00.00 HEALTHCARE MAINTENANCE: Primary | ICD-10-CM

## 2019-11-06 LAB
BASOPHILS # BLD AUTO: 0 10E9/L (ref 0–0.2)
BASOPHILS NFR BLD AUTO: 0.1 %
DIFFERENTIAL METHOD BLD: NORMAL
EOSINOPHIL # BLD AUTO: 0.3 10E9/L (ref 0–0.7)
EOSINOPHIL NFR BLD AUTO: 3 %
ERYTHROCYTE [DISTWIDTH] IN BLOOD BY AUTOMATED COUNT: 12.2 % (ref 10–15)
HCT VFR BLD AUTO: 40.5 % (ref 35–47)
HGB BLD-MCNC: 13.4 G/DL (ref 11.7–15.7)
LYMPHOCYTES # BLD AUTO: 1.2 10E9/L (ref 0.8–5.3)
LYMPHOCYTES NFR BLD AUTO: 15 %
MCH RBC QN AUTO: 30 PG (ref 26.5–33)
MCHC RBC AUTO-ENTMCNC: 33.1 G/DL (ref 31.5–36.5)
MCV RBC AUTO: 91 FL (ref 78–100)
MONOCYTES # BLD AUTO: 0.5 10E9/L (ref 0–1.3)
MONOCYTES NFR BLD AUTO: 6.6 %
NEUTROPHILS # BLD AUTO: 6.2 10E9/L (ref 1.6–8.3)
NEUTROPHILS NFR BLD AUTO: 75.3 %
PLATELET # BLD AUTO: 345 10E9/L (ref 150–450)
RBC # BLD AUTO: 4.47 10E12/L (ref 3.8–5.2)
WBC # BLD AUTO: 8.2 10E9/L (ref 4–11)

## 2019-11-06 PROCEDURE — 84460 ALANINE AMINO (ALT) (SGPT): CPT | Performed by: NURSE PRACTITIONER

## 2019-11-06 PROCEDURE — 84443 ASSAY THYROID STIM HORMONE: CPT | Performed by: NURSE PRACTITIONER

## 2019-11-06 PROCEDURE — 99396 PREV VISIT EST AGE 40-64: CPT | Mod: 25 | Performed by: NURSE PRACTITIONER

## 2019-11-06 PROCEDURE — 85025 COMPLETE CBC W/AUTO DIFF WBC: CPT | Performed by: NURSE PRACTITIONER

## 2019-11-06 PROCEDURE — 80061 LIPID PANEL: CPT | Performed by: NURSE PRACTITIONER

## 2019-11-06 PROCEDURE — 80048 BASIC METABOLIC PNL TOTAL CA: CPT | Performed by: NURSE PRACTITIONER

## 2019-11-06 PROCEDURE — 90471 IMMUNIZATION ADMIN: CPT | Performed by: NURSE PRACTITIONER

## 2019-11-06 PROCEDURE — 90686 IIV4 VACC NO PRSV 0.5 ML IM: CPT | Performed by: NURSE PRACTITIONER

## 2019-11-06 PROCEDURE — 36415 COLL VENOUS BLD VENIPUNCTURE: CPT | Performed by: NURSE PRACTITIONER

## 2019-11-06 ASSESSMENT — ENCOUNTER SYMPTOMS
DIZZINESS: 0
NAUSEA: 0
HEMATOCHEZIA: 0
JOINT SWELLING: 0
SHORTNESS OF BREATH: 0
FREQUENCY: 0
COUGH: 0
CHILLS: 0
WEAKNESS: 1
HEADACHES: 0
NERVOUS/ANXIOUS: 0
ARTHRALGIAS: 0
EYE PAIN: 0
PARESTHESIAS: 0
DYSURIA: 0
PALPITATIONS: 0
HEARTBURN: 0
BREAST MASS: 0
CONSTIPATION: 0
ABDOMINAL PAIN: 0
SORE THROAT: 0
HEMATURIA: 0
MYALGIAS: 0
FEVER: 0
DIARRHEA: 0

## 2019-11-06 ASSESSMENT — MIFFLIN-ST. JEOR: SCORE: 1197.25

## 2019-11-06 NOTE — NURSING NOTE
"Patient here for a physical and is fasting,no pap needed.  BP 96/72 (BP Location: Right arm, Patient Position: Sitting, Cuff Size: Adult Regular)   Pulse 88   Temp 97.9  F (36.6  C) (Oral)   Resp 16   Ht 1.511 m (4' 11.5\")   Wt 62.9 kg (138 lb 9.6 oz)   LMP 11/05/2019 (Exact Date)   SpO2 97%   BMI 27.53 kg/m      "

## 2019-11-06 NOTE — PROGRESS NOTES
SUBJECTIVE:   CC: Katie Wilson is an 44 year old woman who presents for preventive health visit.     Healthy Habits:     Getting at least 3 servings of Calcium per day:  Yes    Bi-annual eye exam:  Yes    Dental care twice a year:  Yes    Sleep apnea or symptoms of sleep apnea:  None    Diet:  Low salt, Low fat/cholesterol and Gluten-free/reduced    Frequency of exercise:  None    Taking medications regularly:  Yes    Medication side effects:  None    PHQ-2 Total Score: 1    Additional concerns today:  No              Today's PHQ-2 Score:   PHQ-2 (  [employer]) 2019   Q1: Little interest or pleasure in doing things 0   Q2: Feeling down, depressed or hopeless 1   PHQ-2 Score 1   Q1: Little interest or pleasure in doing things Not at all   Q2: Feeling down, depressed or hopeless Several days   PHQ-2 Score 1       Abuse: Current or Past(Physical, Sexual or Emotional)- No  Do you feel safe in your environment? Yes        Social History     Tobacco Use     Smoking status: Never Smoker     Smokeless tobacco: Never Used   Substance Use Topics     Alcohol use: No     Alcohol/week: 0.0 standard drinks         Alcohol Use 2019   Prescreen: >3 drinks/day or >7 drinks/week? Not Applicable   Prescreen: >3 drinks/day or >7 drinks/week? -       Reviewed orders with patient.  Reviewed health maintenance and updated orders accordingly - Yes  BP Readings from Last 3 Encounters:   19 96/72   18 94/60   18 109/74    Wt Readings from Last 3 Encounters:   19 62.9 kg (138 lb 9.6 oz)   18 56.7 kg (125 lb)   18 54.9 kg (121 lb)                  Patient Active Problem List   Diagnosis     Lump of right breast     Thyroid disorder     Herpes simplex virus (HSV) infection     History of /currently pregnant     AMA (advanced maternal age) multigravida 35+     Encounter for triage in pregnant patient     S/P      Moderate episode of recurrent major depressive disorder (H)      ELVIN (generalized anxiety disorder)     Past Surgical History:   Procedure Laterality Date      SECTION N/A 2015    Procedure:  SECTION;  Surgeon: Yohana Monroy MD;  Location: [facility]      SECTION N/A 1/3/2017    Procedure:  SECTION;  Surgeon: Jerzy Camacho MD;  Location: [facility]       Social History     Tobacco Use     Smoking status: Never Smoker     Smokeless tobacco: Never Used   Substance Use Topics     Alcohol use: No     Alcohol/week: 0.0 standard drinks     Family History   Problem Relation Age of Onset     Cerebrovascular Disease Father      Hypertension Mother      Cervical Cancer Mother          Current Outpatient Medications   Medication Sig Dispense Refill     PRENAT VIT-FEPOLY-METHYLFOL-FA PO          Mammogram not appropriate for this patient based on age.    Pertinent mammograms are reviewed under the imaging tab.  History of abnormal Pap smear: NO - age 30- 65 PAP every 3 years recommended  PAP / HPV Latest Ref Rng & Units 2017   PAP - NIL NIL   HPV 16 DNA NEG:Negative Negative -   HPV 18 DNA NEG:Negative Negative -   OTHER HR HPV NEG:Negative Negative -     Reviewed and updated as needed this visit by clinical staff  Tobacco  Allergies  Meds  Med Hx  Surg Hx  Fam Hx  Soc Hx        Reviewed and updated as needed this visit by Provider            Review of Systems   Constitutional: Negative for chills and fever.   HENT: Negative for congestion, ear pain, hearing loss and sore throat.    Eyes: Negative for pain and visual disturbance.   Respiratory: Negative for cough and shortness of breath.    Cardiovascular: Negative for chest pain, palpitations and peripheral edema.   Gastrointestinal: Negative for abdominal pain, constipation, diarrhea, heartburn, hematochezia and nausea.   Breasts:  Negative for tenderness, breast mass and discharge.   Genitourinary: Negative for dysuria, frequency, genital sores, hematuria, pelvic pain,  "urgency, vaginal bleeding and vaginal discharge.   Musculoskeletal: Negative for arthralgias, joint swelling and myalgias.   Skin: Negative for rash.   Neurological: Positive for weakness. Negative for dizziness, headaches and paresthesias.   Psychiatric/Behavioral: Positive for mood changes. The patient is not nervous/anxious.           OBJECTIVE:   BP 96/72 (BP Location: Right arm, Patient Position: Sitting, Cuff Size: Adult Regular)   Pulse 88   Temp 97.9  F (36.6  C) (Oral)   Resp 16   Ht 1.511 m (4' 11.5\")   Wt 62.9 kg (138 lb 9.6 oz)   LMP 11/05/2019 (Exact Date)   SpO2 97%   BMI 27.53 kg/m    Physical Exam  GENERAL: healthy, alert and no distress  NECK: no adenopathy, no asymmetry, masses, or scars and thyroid normal to palpation  RESP: lungs clear to auscultation - no rales, rhonchi or wheezes  CV: regular rate and rhythm, normal S1 S2, no S3 or S4, no murmur, click or rub, no peripheral edema and peripheral pulses strong  ABDOMEN: soft, nontender, no hepatosplenomegaly, no masses and bowel sounds normal  MS: no gross musculoskeletal defects noted, no edema  NEURO: Normal strength and tone, mentation intact and speech normal  PSYCH: mentation appears normal, affect normal/bright        ASSESSMENT/PLAN:       ICD-10-CM    1. Healthcare maintenance Z00.00 ALT     Basic metabolic panel     CBC with platelets differential     Lipid Profile     TSH       COUNSELING:  Reviewed preventive health counseling, as reflected in patient instructions    Estimated body mass index is 27.53 kg/m  as calculated from the following:    Height as of this encounter: 1.511 m (4' 11.5\").    Weight as of this encounter: 62.9 kg (138 lb 9.6 oz).    F/u yearly  Labs pending     reports that she has never smoked. She has never used smokeless tobacco.    Counseling Resources:  ATP IV Guidelines  Pooled Cohorts Equation Calculator  Breast Cancer Risk Calculator  FRAX Risk Assessment  ICSI Preventive Guidelines  Dietary Guidelines " for Americans, 2010  [place]'s MyPlate  ASA Prophylaxis  Lung CA Screening    India Diggs, JAS  [facility]

## 2019-11-07 LAB
ALT SERPL W P-5'-P-CCNC: 20 U/L (ref 0–50)
ANION GAP SERPL CALCULATED.3IONS-SCNC: 5 MMOL/L (ref 3–14)
BUN SERPL-MCNC: 10 MG/DL (ref 7–30)
CALCIUM SERPL-MCNC: 8.8 MG/DL (ref 8.5–10.1)
CHLORIDE SERPL-SCNC: 106 MMOL/L (ref 94–109)
CHOLEST SERPL-MCNC: 219 MG/DL
CO2 SERPL-SCNC: 26 MMOL/L (ref 20–32)
CREAT SERPL-MCNC: 0.54 MG/DL (ref 0.52–1.04)
GFR SERPL CREATININE-BSD FRML MDRD: >90 ML/MIN/{1.73_M2}
GLUCOSE SERPL-MCNC: 78 MG/DL (ref 70–99)
HDLC SERPL-MCNC: 49 MG/DL
LDLC SERPL CALC-MCNC: 138 MG/DL
NONHDLC SERPL-MCNC: 167 MG/DL
POTASSIUM SERPL-SCNC: 4.1 MMOL/L (ref 3.4–5.3)
SODIUM SERPL-SCNC: 137 MMOL/L (ref 133–144)
TRIGL SERPL-MCNC: 151 MG/DL
TSH SERPL DL<=0.005 MIU/L-ACNC: 0.51 MU/L (ref 0.4–4)

## 2019-12-11 ENCOUNTER — OFFICE VISIT (OUTPATIENT)
Dept: OBGYN | Facility: CLINIC | Age: 44
End: 2019-12-11
Payer: COMMERCIAL

## 2019-12-11 ENCOUNTER — ANCILLARY PROCEDURE (OUTPATIENT)
Dept: ULTRASOUND IMAGING | Facility: CLINIC | Age: 44
End: 2019-12-11
Attending: OBSTETRICS & GYNECOLOGY
Payer: COMMERCIAL

## 2019-12-11 VITALS
HEIGHT: 60 IN | BODY MASS INDEX: 26.31 KG/M2 | SYSTOLIC BLOOD PRESSURE: 108 MMHG | DIASTOLIC BLOOD PRESSURE: 62 MMHG | WEIGHT: 134 LBS

## 2019-12-11 DIAGNOSIS — N92.6 ABNORMAL BLEEDING IN MENSTRUAL CYCLE: Primary | ICD-10-CM

## 2019-12-11 DIAGNOSIS — N92.6 ABNORMAL BLEEDING IN MENSTRUAL CYCLE: ICD-10-CM

## 2019-12-11 DIAGNOSIS — N81.4 PELVIC RELAXATION DUE TO UTERINE PROLAPSE: ICD-10-CM

## 2019-12-11 PROBLEM — Z36.89 ENCOUNTER FOR TRIAGE IN PREGNANT PATIENT: Status: RESOLVED | Noted: 2017-01-03 | Resolved: 2019-12-11

## 2019-12-11 LAB — HCG UR QL: NEGATIVE

## 2019-12-11 PROCEDURE — 87491 CHLMYD TRACH DNA AMP PROBE: CPT | Performed by: OBSTETRICS & GYNECOLOGY

## 2019-12-11 PROCEDURE — 76830 TRANSVAGINAL US NON-OB: CPT | Performed by: OBSTETRICS & GYNECOLOGY

## 2019-12-11 PROCEDURE — 81025 URINE PREGNANCY TEST: CPT | Performed by: OBSTETRICS & GYNECOLOGY

## 2019-12-11 PROCEDURE — 87591 N.GONORRHOEAE DNA AMP PROB: CPT | Performed by: OBSTETRICS & GYNECOLOGY

## 2019-12-11 PROCEDURE — 76856 US EXAM PELVIC COMPLETE: CPT | Performed by: OBSTETRICS & GYNECOLOGY

## 2019-12-11 PROCEDURE — 99214 OFFICE O/P EST MOD 30 MIN: CPT | Performed by: OBSTETRICS & GYNECOLOGY

## 2019-12-11 ASSESSMENT — ENCOUNTER SYMPTOMS
VOMITING: 0
CHILLS: 0
DYSURIA: 0
FREQUENCY: 0
FEVER: 0
NAUSEA: 0
DIARRHEA: 0
CONSTIPATION: 0

## 2019-12-11 ASSESSMENT — MIFFLIN-ST. JEOR: SCORE: 1176.38

## 2019-12-11 NOTE — PROGRESS NOTES
SUBJECTIVE:                                                   Katie Wilson is a 44 year old female who presents to clinic today with the Chief Complaint of:  Patient presents with:  Vaginal Problem: Prolapse       Vaginal Bleeding (Bleeding earlier than expected cycle)  Onset: 26  Description:  Had her most recent term pregnancy delivered by C/S just under 3 years ago.  Had regular menses Q 28-30 days for last 2 years with normal menses 20 lasting 4 days with typical moderate flow CD#2-3.  However, she had recurrence of VB -, again moderate flow but during CD#3-4, and spotting the other days.  No VB currently.  Duration of bleeding episodes: 7 days most recent episode which was a little longer than normal.  Frequency between periods:  This was an isolated abnl menses.  Describe bleeding/flow: Moderate  Clots: no  Number of hours between maxipads/supertampons: 6  Cramping: None    Accompanying Signs & Symptoms:  Weakness: no  Lightheadedness: no  Hot flashes: no  Nosebleeds/Easy bruising: no  Vaginal Discharge: no  Pelvic prolapse sx's:  Pt also c/o some pelvic pressure and the feeling she needs to push her uterus up digitally when she voids or has a BM.  No definite incontinence.  Wants to see Dr. Sevilla about that.    History:  Patient's last menstrual period was 2019 (exact date).  Previous normal periods: YES  Contraceptive use: condoms  Possibility of Pregnancy: YES  Any bleeding after intercourse: no  Abnormal PAP Smears: no - not due until 2020.    Precipitating factors:   None  Alleviating factors:  None    Therapies Tried and outcome: None tried      Patient's last menstrual period was 2019 (exact date)..   Patient is sexually active, .  Using condoms for contraception.    reports that she has never smoked. She has never used smokeless tobacco.    Problem list and histories reviewed & adjusted, as indicated.  Additional history: as documented.    Patient Active  "Problem List   Diagnosis     Lump of right breast     Thyroid disorder     Herpes simplex virus (HSV) infection     S/P      Moderate episode of recurrent major depressive disorder (H)     ELVIN (generalized anxiety disorder)     Past Surgical History:   Procedure Laterality Date      SECTION N/A 2015    Procedure:  SECTION;  Surgeon: Yohana Monroy MD;  Location: [facility]      SECTION N/A 1/3/2017    Procedure:  SECTION;  Surgeon: Jerzy Camacho MD;  Location: [facility]      Social History     Tobacco Use     Smoking status: Never Smoker     Smokeless tobacco: Never Used   Substance Use Topics     Alcohol use: No     Alcohol/week: 0.0 standard drinks      Problem (# of Occurrences) Relation (Name,Age of Onset)    Cerebrovascular Disease (1) Father ()    Cervical Cancer (1) Mother (Anabel Mandel)    Hypertension (1) Mother (Anabel Mandel)            Current Outpatient Medications   Medication Sig     PRENAT VIT-FEPOLY-METHYLFOL-FA PO      No current facility-administered medications for this visit.      No Known Allergies    ROS:  Review of Systems   Constitutional: Negative for chills and fever.   Gastrointestinal: Negative for constipation, diarrhea, nausea and vomiting.   Genitourinary: Negative for dysuria, frequency, pelvic pain, urgency and vaginal discharge.   All other systems reviewed and are negative.        OBJECTIVE:     /62 (BP Location: Right arm, Patient Position: Sitting, Cuff Size: Adult Regular)   Ht 1.511 m (4' 11.5\")   Wt 60.8 kg (134 lb)   LMP 2019 (Exact Date)   BMI 26.61 kg/m    Body mass index is 26.61 kg/m .    Exam:  Physical Exam  Constitutional:       General: She is not in acute distress.     Appearance: She is normal weight. She is not ill-appearing.   HENT:      Head: Normocephalic.   Pulmonary:      Effort: Pulmonary effort is normal.   Skin:     General: Skin is warm.   Neurological:      Mental Status: She is " alert.   Psychiatric:         Mood and Affect: Mood normal.     Abdomen- Abdomen soft, non-tender. BS normal. No masses, organomegaly  Pelvic- Exam chaperoned by nurse, External genitalia normal, Bartholin's glands normal, [place]'s glands normal, Urethral meatus normal, Urethra normal, Bladder normal, Vagina with normal rugae, no abnormal lesions, no abnormal discharge, Normal cervix without lesions or mucopus, no cervical motion tenderness, Uterus with slight descent noted, normal size, shape, and contour, no masses, non-tender, Adnexa normal size without masses or tenderness bilaterally, Anus normal, GC/Chlam probe was Done      Prior Pertinent Lab Results Reviewed:  Pap-WNL, HPV Neg November / 17 / 2017        ASSESSMENT:                                                      Encounter Diagnoses   Name Primary?     Abnormal bleeding in menstrual cycle Yes     Pelvic relaxation due to uterine prolapse          PLAN:                                                      1)  UPT  2)  GC/Chlam sent  3)  I advised Pt she is at low risk for endometrial hyperplasia since she had a full term pregnancy just under 3 years ago and had normal menses ever since until this isolated episode.  Pelvic U/S at end of menses to confirm endometrial stripe thickness and r/o myomas.  If stripe is normal and no other abnl findings, would have Pt monitor menstrual calendar.  If cycles return to normal, no further w/u needed.  If still abnl VB or thickened endometrium after menses, consider Op Hyst w/ IUM, D&C.  4)  Pt to see Dr. Sevilla about her sx's of pelvic or uterine prolapse.  Of note, her exam doesn't seem to have findings c/w her sx's but will get consult to confirm if Rx available for her sx's.      Rory Staley MD  [facility]

## 2019-12-11 NOTE — NURSING NOTE
"Chief Complaint   Patient presents with     Vaginal Problem     Prolapse        Initial /62 (BP Location: Right arm, Patient Position: Sitting, Cuff Size: Adult Regular)   Ht 1.511 m (4' 11.5\")   Wt 60.8 kg (134 lb)   LMP 2019 (Exact Date)   BMI 26.61 kg/m   Estimated body mass index is 26.61 kg/m  as calculated from the following:    Height as of this encounter: 1.511 m (4' 11.5\").    Weight as of this encounter: 60.8 kg (134 lb).  BP completed using cuff size: regular    Questioned patient about current smoking habits.  Pt. has never smoked.          The following HM Due: NONE    Alfa Hurley CMA                "

## 2019-12-12 LAB
C TRACH DNA SPEC QL NAA+PROBE: NEGATIVE
N GONORRHOEA DNA SPEC QL NAA+PROBE: NEGATIVE
SPECIMEN SOURCE: NORMAL
SPECIMEN SOURCE: NORMAL

## 2019-12-13 DIAGNOSIS — N83.201 RIGHT OVARIAN CYST: Primary | ICD-10-CM

## 2019-12-13 PROBLEM — N83.291 COMPLEX CYST OF RIGHT OVARY: Status: ACTIVE | Noted: 2019-12-13

## 2019-12-18 NOTE — PROGRESS NOTES
Pre-Visit Planning     Future Appointments   Date Time Provider Department Center   12/19/2019 10:25 AM Coming Lexii Coughlin MD [facility]   1/31/2020  9:45 AM [facility]   2/3/2020  3:00 PM Douglas Sevilla MD [facility]     Arrival Time for this Appointment: 10:05 AM   Appointment Notes for this encounter:   Stress / Depression looking for a medication to help manage / also medication for sore throat and cough.    Questionnaires Reviewed/Assigned  Additional questionnaires assigned     Patient preferred phone number: [phone]    Patient contact not needed.  Rema Skelton, BSN, RN, PHN

## 2019-12-19 ENCOUNTER — OFFICE VISIT (OUTPATIENT)
Dept: FAMILY MEDICINE | Facility: CLINIC | Age: 44
End: 2019-12-19
Payer: COMMERCIAL

## 2019-12-19 VITALS
WEIGHT: 132.2 LBS | SYSTOLIC BLOOD PRESSURE: 104 MMHG | DIASTOLIC BLOOD PRESSURE: 78 MMHG | BODY MASS INDEX: 26.25 KG/M2 | TEMPERATURE: 98.2 F | HEART RATE: 72 BPM

## 2019-12-19 DIAGNOSIS — F33.1 MODERATE EPISODE OF RECURRENT MAJOR DEPRESSIVE DISORDER (H): Primary | ICD-10-CM

## 2019-12-19 DIAGNOSIS — R05.9 COUGH: ICD-10-CM

## 2019-12-19 DIAGNOSIS — F41.1 GAD (GENERALIZED ANXIETY DISORDER): ICD-10-CM

## 2019-12-19 PROCEDURE — 99214 OFFICE O/P EST MOD 30 MIN: CPT | Performed by: FAMILY MEDICINE

## 2019-12-19 RX ORDER — HYDROXYZINE HYDROCHLORIDE 25 MG/1
25 TABLET, FILM COATED ORAL EVERY 6 HOURS PRN
Qty: 30 TABLET | Refills: 1 | Status: SHIPPED | OUTPATIENT
Start: 2019-12-19 | End: 2021-06-23

## 2019-12-19 RX ORDER — BENZONATATE 100 MG/1
100 CAPSULE ORAL 3 TIMES DAILY PRN
Qty: 30 CAPSULE | Refills: 0 | Status: SHIPPED | OUTPATIENT
Start: 2019-12-19 | End: 2021-06-23

## 2019-12-19 RX ORDER — ESCITALOPRAM OXALATE 10 MG/1
10 TABLET ORAL DAILY
Qty: 30 TABLET | Refills: 1 | Status: SHIPPED | OUTPATIENT
Start: 2019-12-19 | End: 2021-06-23

## 2019-12-19 ASSESSMENT — ANXIETY QUESTIONNAIRES
5. BEING SO RESTLESS THAT IT IS HARD TO SIT STILL: NOT AT ALL
7. FEELING AFRAID AS IF SOMETHING AWFUL MIGHT HAPPEN: NOT AT ALL
2. NOT BEING ABLE TO STOP OR CONTROL WORRYING: SEVERAL DAYS
7. FEELING AFRAID AS IF SOMETHING AWFUL MIGHT HAPPEN: NOT AT ALL
4. TROUBLE RELAXING: NEARLY EVERY DAY
1. FEELING NERVOUS, ANXIOUS, OR ON EDGE: MORE THAN HALF THE DAYS
GAD7 TOTAL SCORE: 10
3. WORRYING TOO MUCH ABOUT DIFFERENT THINGS: SEVERAL DAYS
6. BECOMING EASILY ANNOYED OR IRRITABLE: NEARLY EVERY DAY
GAD7 TOTAL SCORE: 10
GAD7 TOTAL SCORE: 10

## 2019-12-19 ASSESSMENT — PATIENT HEALTH QUESTIONNAIRE - PHQ9
SUM OF ALL RESPONSES TO PHQ QUESTIONS 1-9: 13
SUM OF ALL RESPONSES TO PHQ QUESTIONS 1-9: 13
10. IF YOU CHECKED OFF ANY PROBLEMS, HOW DIFFICULT HAVE THESE PROBLEMS MADE IT FOR YOU TO DO YOUR WORK, TAKE CARE OF THINGS AT HOME, OR GET ALONG WITH OTHER PEOPLE: SOMEWHAT DIFFICULT

## 2019-12-19 ASSESSMENT — ENCOUNTER SYMPTOMS: COUGH: 1

## 2019-12-19 NOTE — PATIENT INSTRUCTIONS
1.  Lexapro, take pill once daily.    2.  Hydroxyzine: Take pill as needed for anxiety symptoms, up to 4 times per day as needed.      Patient Education     Escitalopram tablets  Brand Name: Lexapro  What is this medicine?  ESCITALOPRAM (es sye JUAN M oh pram) is used to treat depression and certain types of anxiety.  How should I use this medicine?  Take this medicine by mouth with a glass of water. Follow the directions on the prescription label. You can take it with or without food. If it upsets your stomach, take it with food. Take your medicine at regular intervals. Do not take it more often than directed. Do not stop taking this medicine suddenly except upon the advice of your doctor. Stopping this medicine too quickly may cause serious side effects or your condition may worsen.  A special MedGuide will be given to you by the pharmacist with each prescription and refill. Be sure to read this information carefully each time.  Talk to your pediatrician regarding the use of this medicine in children. Special care may be needed.  What side effects may I notice from receiving this medicine?  Side effects that you should report to your doctor or health care professional as soon as possible:    allergic reactions like skin rash, itching or hives, swelling of the face, lips, or tongue    anxious    black, tarry stools    changes in vision    confusion    elevated mood, decreased need for sleep, racing thoughts, impulsive behavior    eye pain    fast, irregular heartbeat    feeling faint or lightheaded, falls    feeling agitated, angry, or irritable    hallucination, loss of contact with reality    loss of balance or coordination    loss of memory    painful or prolonged erections    restlessness, pacing, inability to keep still    seizures    stiff muscles    suicidal thoughts or other mood changes    trouble sleeping    unusual bleeding or bruising    unusually weak or tired    vomiting  Side effects that usually do not  require medical attention (report to your doctor or health care professional if they continue or are bothersome):    changes in appetite    change in sex drive or performance    headache    increased sweating    indigestion, nausea    tremors  What may interact with this medicine?  Do not take this medicine with any of the following medications:    certain medicines for fungal infections like fluconazole, itraconazole, ketoconazole, posaconazole, voriconazole    cisapride    citalopram    dofetilide    dronedarone    linezolid    MAOIs like Carbex, Eldepryl, Marplan, Nardil, and Parnate    methylene blue (injected into a vein)    pimozide    thioridazine    ziprasidone  This medicine may also interact with the following medications:    alcohol    amphetamines    aspirin and aspirin-like medicines    carbamazepine    certain medicines for depression, anxiety, or psychotic disturbances    certain medicines for migraine headache like almotriptan, eletriptan, frovatriptan, naratriptan, rizatriptan, sumatriptan, zolmitriptan    certain medicines for sleep    certain medicines that treat or prevent blood clots like warfarin, enoxaparin, dalteparin    cimetidine    diuretics    fentanyl    furazolidone    isoniazid    lithium    metoprolol    NSAIDs, medicines for pain and inflammation, like ibuprofen or naproxen    other medicines that prolong the QT interval (cause an abnormal heart rhythm)    procarbazine    rasagiline    supplements like Chaparrita's wort, kava kava, valerian    tramadol    tryptophan  What if I miss a dose?  If you miss a dose, take it as soon as you can. If it is almost time for your next dose, take only that dose. Do not take double or extra doses.  Where should I keep my medicine?  Keep out of reach of children.  Store at room temperature between 15 and 30 degrees C (59 and 86 degrees F). Throw away any unused medicine after the expiration date.  What should I tell my health care provider before I  take this medicine?  They need to know if you have any of these conditions:    bipolar disorder or a family history of bipolar disorder    diabetes    glaucoma    heart disease    kidney or liver disease    receiving electroconvulsive therapy    seizures (convulsions)    suicidal thoughts, plans, or attempt by you or a family member    an unusual or allergic reaction to escitalopram, the related drug citalopram, other medicines, foods, dyes, or preservatives    pregnant or trying to become pregnant    breast-feeding  What should I watch for while using this medicine?  Tell your doctor if your symptoms do not get better or if they get worse. Visit your doctor or health care professional for regular checks on your progress. Because it may take several weeks to see the full effects of this medicine, it is important to continue your treatment as prescribed by your doctor.  Patients and their families should watch out for new or worsening thoughts of suicide or depression. Also watch out for sudden changes in feelings such as feeling anxious, agitated, panicky, irritable, hostile, aggressive, impulsive, severely restless, overly excited and hyperactive, or not being able to sleep. If this happens, especially at the beginning of treatment or after a change in dose, call your health care professional.  You may get drowsy or dizzy. Do not drive, use machinery, or do anything that needs mental alertness until you know how this medicine affects you. Do not stand or sit up quickly, especially if you are an older patient. This reduces the risk of dizzy or fainting spells. Alcohol may interfere with the effect of this medicine. Avoid alcoholic drinks.  Your mouth may get dry. Chewing sugarless gum or sucking hard candy, and drinking plenty of water may help. Contact your doctor if the problem does not go away or is severe.  NOTE:This sheet is a summary. It may not cover all possible information. If you have questions about this  medicine, talk to your doctor, pharmacist, or health care provider. Copyright  2019 [employer]           Patient Education     Hydroxyzine capsules or tablets  Brand Names: ANX, Atarax, Rezine, Vistaril  What is this medicine?  HYDROXYZINE (remigio DROX i zeen) is an antihistamine. This medicine is used to treat allergy symptoms. It is also used to treat anxiety and tension. This medicine can be used with other medicines to induce sleep before surgery.  How should I use this medicine?  Take this medicine by mouth with a full glass of water. Follow the directions on the prescription label. You may take this medicine with food or on an empty stomach. Take your medicine at regular intervals. Do not take your medicine more often than directed.  Talk to your pediatrician regarding the use of this medicine in children. Special care may be needed. While this drug may be prescribed for children as young as 6 years of age for selected conditions, precautions do apply.  Patients over 65 years old may have a stronger reaction and need a smaller dose.  What side effects may I notice from receiving this medicine?  Side effects that you should report to your doctor or health care professional as soon as possible:    fast or irregular heartbeat    difficulty passing urine    seizures    slurred speech or confusion    tremor  Side effects that usually do not require medical attention (report to your doctor or health care professional if they continue or are bothersome):    constipation    drowsiness    fatigue    headache    stomach upset  What may interact with this medicine?    alcohol    barbiturate medicines for sleep or seizures    medicines for colds, allergies    medicines for depression, anxiety, or emotional disturbances    medicines for pain    medicines for sleep    muscle relaxants    What if I miss a dose?  If you miss a dose, take it as soon as you can. If it is almost time for your next dose, take only that dose. Do not  take double or extra doses.  Where should I keep my medicine?  Keep out of the reach of children.  Store at room temperature between 15 and 30 degrees C (59 and 86 degrees F). Keep container tightly closed. Throw away any unused medicine after the expiration date.  What should I tell my health care provider before I take this medicine?  They need to know if you have any of these conditions:    any chronic illness    difficulty passing urine    glaucoma    heart disease    kidney disease    liver disease    lung disease    an unusual or allergic reaction to hydroxyzine, cetirizine, other medicines, foods, dyes, or preservatives    pregnant or trying to get pregnant    breast-feeding  What should I watch for while using this medicine?  Tell your doctor or health care professional if your symptoms do not improve.  You may get drowsy or dizzy. Do not drive, use machinery, or do anything that needs mental alertness until you know how this medicine affects you. Do not stand or sit up quickly, especially if you are an older patient. This reduces the risk of dizzy or fainting spells. Alcohol may interfere with the effect of this medicine. Avoid alcoholic drinks.  Your mouth may get dry. Chewing sugarless gum or sucking hard candy, and drinking plenty of water may help. Contact your doctor if the problem does not go away or is severe.  This medicine may cause dry eyes and blurred vision. If you wear contact lenses you may feel some discomfort. Lubricating drops may help. See your eye doctor if the problem does not go away or is severe.  If you are receiving skin tests for allergies, tell your doctor you are using this medicine.  NOTE:This sheet is a summary. It may not cover all possible information. If you have questions about this medicine, talk to your doctor, pharmacist, or health care provider. Copyright  2019 [employer]

## 2019-12-19 NOTE — PROGRESS NOTES
Subjective     Katie Wilson is a 44 year old female who presents to clinic today for the following health issues:      Mental Health Follow-up:  Patient presents to follow-up on Anxiety.Patient's anxiety since last visit has been:  MediumThe patient is having other symptoms associated with anxiety.Any significant life events: NoPatient is not feeling anxious or having panic attacks.Patient has no concerns about alcohol or drug use.     Social History  Tobacco Use    Smoking status: Never Smoker    Smokeless tobacco: Never Used  Alcohol use: No    Alcohol/week: 0.0 standard drinks  Drug use: No      Today's PHQ-9         PHQ-9 Total Score:     (P) 13   PHQ-9 Q9 Thoughts of better off dead/self-harm past 2 weeks :   (P) Not at all   Thoughts of suicide or self harm:      Self-harm Plan:        Self-harm Action:          Safety concerns for self or others:         Cough     History of Present Illness        Mental Health Follow-up:  Patient presents to follow-up on Anxiety.    Patient's anxiety since last visit has been:  Medium  The patient is having other symptoms associated with anxiety.  Any significant life events: No  Patient is not feeling anxious or having panic attacks.  Patient has no concerns about alcohol or drug use.     Social History  Tobacco Use    Smoking status: Never Smoker    Smokeless tobacco: Never Used  Alcohol use: No    Alcohol/week: 0.0 standard drinks  Drug use: No      Today's PHQ-9         PHQ-9 Total Score:     (P) 13   PHQ-9 Q9 Thoughts of better off dead/self-harm past 2 weeks :   (P) Not at all   Thoughts of suicide or self harm:      Self-harm Plan:        Self-harm Action:          Safety concerns for self or others:           She eats 2-3 servings of fruits and vegetables daily.She consumes 1 sweetened beverage(s) daily.She is missing 4 dose(s) of medications per week.  She is not taking prescribed medications regularly due to remembering to take.     Depression and Anxiety  Follow-Up    How are you doing with your depression since your last visit? Worsened from family issues    How are you doing with your anxiety since your last visit?  Has worries     Are you having other symptoms that might be associated with depression or anxiety? Yes:  hard to concentrate     Have you had a significant life event? OTHER: family      Do you have any concerns with your use of alcohol or other drugs? No    1.  Patient reports daily anger, anxiety, is stressed out.  Daughter with ADHD who is causing some stress, acts out, considering medication for daughter.  Does not feel that she has much patience.  Not able to stop worrying.   is aware and tries to encourage alone time for patient, but she has a hard time relaxing.  Has other children that she cares for, and feels that she doesn't get enough time with kids, seems like someone is always yelling and screaming.  She is tired constantly, but not able to sleep.    2.  Cough, for last few weeks, no other symptoms, thick sputum which is sticky, no SOB.    Social History     Tobacco Use     Smoking status: Never Smoker     Smokeless tobacco: Never Used   Substance Use Topics     Alcohol use: No     Alcohol/week: 0.0 standard drinks     Drug use: No     PHQ 9/25/2018 11/6/2019 12/19/2019   PHQ-9 Total Score 6 - 13   Q9: Thoughts of better off dead/self-harm past 2 weeks Not at all Not at all Not at all     ELVIN-7 SCORE 8/30/2018 12/19/2019   Total Score 9 (mild anxiety) 10 (moderate anxiety)   Total Score 9 10       Suicide Assessment Five-step Evaluation and Treatment (SAFE-T)      How many servings of fruits and vegetables do you eat daily?  2-3    On average, how many sweetened beverages do you drink each day (Examples: soda, juice, sweet tea, etc.  Do NOT count diet or artificially sweetened beverages)?   1  How many days per week do you miss taking your medication? Almost every day    What makes it hard for you to take your medications?   remembering to take        Patient Active Problem List   Diagnosis     Lump of right breast     Thyroid disorder     Herpes simplex virus (HSV) infection     S/P      Moderate episode of recurrent major depressive disorder (H)     ELVIN (generalized anxiety disorder)     Complex cyst of right ovary     Past Surgical History:   Procedure Laterality Date      SECTION N/A 2015    Procedure:  SECTION;  Surgeon: Yohana Monroy MD;  Location: [facility]      SECTION N/A 1/3/2017    Procedure:  SECTION;  Surgeon: Jerzy Camacho MD;  Location: [facility]       Social History     Tobacco Use     Smoking status: Never Smoker     Smokeless tobacco: Never Used   Substance Use Topics     Alcohol use: No     Alcohol/week: 0.0 standard drinks     Family History   Problem Relation Age of Onset     Cerebrovascular Disease Father      Hypertension Mother      Cervical Cancer Mother          Current Outpatient Medications   Medication Sig Dispense Refill     PRENAT VIT-FEPOLY-METHYLFOL-FA PO        No Known Allergies    Reviewed and updated as needed this visit by Provider  Allergies  Meds  Problems         Review of Systems   Respiratory: Positive for cough.       ROS COMP: Constitutional, HEENT, cardiovascular, pulmonary, gi and gu systems are negative, except as otherwise noted.      Objective    /78 (BP Location: Right arm, Patient Position: Chair, Cuff Size: Adult Regular)   Pulse 72   Temp 98.2  F (36.8  C) (Oral)   Wt 60 kg (132 lb 3.2 oz)   BMI 26.25 kg/m    Body mass index is 26.25 kg/m .  Physical Exam   GENERAL: healthy, alert and no distress  RESP: lungs clear to auscultation - no rales, rhonchi or wheezes  CV: regular rate and rhythm, normal S1 S2, no S3 or S4, no murmur, click or rub, no peripheral edema and peripheral pulses strong  PSYCH: mentation appears normal, affect normal/bright, anxious, fatigued and appearance well groomed    Labs reviewed in Epic     "    Assessment & Plan     1. Moderate episode of recurrent major depressive disorder (H)  PHQ 9 scores are elevated, and patient has symptoms consistent with depression.  Depression is noted in her past medical history as well.  I am starting her on Lexapro 10 mg daily for both depression and anxiety.  She is to follow-up in 6 weeks time for a recheck, or sooner as needed.  - escitalopram (LEXAPRO) 10 MG tablet; Take 1 tablet (10 mg) by mouth daily  Dispense: 30 tablet; Refill: 1    2. ELVIN (generalized anxiety disorder)  Patient does have increased elvin 7 scores.  I am going to start her on Lexapro for both depression and anxiety.  I also am prescribing hydroxyzine to be used as needed.  Follow-up in 6 weeks time, or sooner as needed.  - escitalopram (LEXAPRO) 10 MG tablet; Take 1 tablet (10 mg) by mouth daily  Dispense: 30 tablet; Refill: 1  - hydrOXYzine (ATARAX) 25 MG tablet; Take 1 tablet (25 mg) by mouth every 6 hours as needed for anxiety  Dispense: 30 tablet; Refill: 1    3. Cough  Patient has normal pulmonary exam today, but continues to have a cough.  I will prescribe her Tessalon Perles to be used as needed.  Follow-up as needed.  - benzonatate (TESSALON) 100 MG capsule; Take 1 capsule (100 mg) by mouth 3 times daily as needed for cough  Dispense: 30 capsule; Refill: 0     BMI:   Estimated body mass index is 26.25 kg/m  as calculated from the following:    Height as of 12/11/19: 1.511 m (4' 11.5\").    Weight as of this encounter: 60 kg (132 lb 3.2 oz).   Weight management plan: Discussed healthy diet and exercise guidelines        Patient Instructions   1.  Lexapro, take pill once daily.    2.  Hydroxyzine: Take pill as needed for anxiety symptoms, up to 4 times per day as needed.      Patient Education     Escitalopram tablets  Brand Name: Lexapro  What is this medicine?  ESCITALOPRAM (es sye JUAN M oh pram) is used to treat depression and certain types of anxiety.  How should I use this medicine?  Take this " medicine by mouth with a glass of water. Follow the directions on the prescription label. You can take it with or without food. If it upsets your stomach, take it with food. Take your medicine at regular intervals. Do not take it more often than directed. Do not stop taking this medicine suddenly except upon the advice of your doctor. Stopping this medicine too quickly may cause serious side effects or your condition may worsen.  A special MedGuide will be given to you by the pharmacist with each prescription and refill. Be sure to read this information carefully each time.  Talk to your pediatrician regarding the use of this medicine in children. Special care may be needed.  What side effects may I notice from receiving this medicine?  Side effects that you should report to your doctor or health care professional as soon as possible:    allergic reactions like skin rash, itching or hives, swelling of the face, lips, or tongue    anxious    black, tarry stools    changes in vision    confusion    elevated mood, decreased need for sleep, racing thoughts, impulsive behavior    eye pain    fast, irregular heartbeat    feeling faint or lightheaded, falls    feeling agitated, angry, or irritable    hallucination, loss of contact with reality    loss of balance or coordination    loss of memory    painful or prolonged erections    restlessness, pacing, inability to keep still    seizures    stiff muscles    suicidal thoughts or other mood changes    trouble sleeping    unusual bleeding or bruising    unusually weak or tired    vomiting  Side effects that usually do not require medical attention (report to your doctor or health care professional if they continue or are bothersome):    changes in appetite    change in sex drive or performance    headache    increased sweating    indigestion, nausea    tremors  What may interact with this medicine?  Do not take this medicine with any of the following medications:    certain  medicines for fungal infections like fluconazole, itraconazole, ketoconazole, posaconazole, voriconazole    cisapride    citalopram    dofetilide    dronedarone    linezolid    MAOIs like Carbex, Eldepryl, Marplan, Nardil, and Parnate    methylene blue (injected into a vein)    pimozide    thioridazine    ziprasidone  This medicine may also interact with the following medications:    alcohol    amphetamines    aspirin and aspirin-like medicines    carbamazepine    certain medicines for depression, anxiety, or psychotic disturbances    certain medicines for migraine headache like almotriptan, eletriptan, frovatriptan, naratriptan, rizatriptan, sumatriptan, zolmitriptan    certain medicines for sleep    certain medicines that treat or prevent blood clots like warfarin, enoxaparin, dalteparin    cimetidine    diuretics    fentanyl    furazolidone    isoniazid    lithium    metoprolol    NSAIDs, medicines for pain and inflammation, like ibuprofen or naproxen    other medicines that prolong the QT interval (cause an abnormal heart rhythm)    procarbazine    rasagiline    supplements like Chaparrita's wort, kava kava, valerian    tramadol    tryptophan  What if I miss a dose?  If you miss a dose, take it as soon as you can. If it is almost time for your next dose, take only that dose. Do not take double or extra doses.  Where should I keep my medicine?  Keep out of reach of children.  Store at room temperature between 15 and 30 degrees C (59 and 86 degrees F). Throw away any unused medicine after the expiration date.  What should I tell my health care provider before I take this medicine?  They need to know if you have any of these conditions:    bipolar disorder or a family history of bipolar disorder    diabetes    glaucoma    heart disease    kidney or liver disease    receiving electroconvulsive therapy    seizures (convulsions)    suicidal thoughts, plans, or attempt by you or a family member    an unusual or allergic  reaction to escitalopram, the related drug citalopram, other medicines, foods, dyes, or preservatives    pregnant or trying to become pregnant    breast-feeding  What should I watch for while using this medicine?  Tell your doctor if your symptoms do not get better or if they get worse. Visit your doctor or health care professional for regular checks on your progress. Because it may take several weeks to see the full effects of this medicine, it is important to continue your treatment as prescribed by your doctor.  Patients and their families should watch out for new or worsening thoughts of suicide or depression. Also watch out for sudden changes in feelings such as feeling anxious, agitated, panicky, irritable, hostile, aggressive, impulsive, severely restless, overly excited and hyperactive, or not being able to sleep. If this happens, especially at the beginning of treatment or after a change in dose, call your health care professional.  You may get drowsy or dizzy. Do not drive, use machinery, or do anything that needs mental alertness until you know how this medicine affects you. Do not stand or sit up quickly, especially if you are an older patient. This reduces the risk of dizzy or fainting spells. Alcohol may interfere with the effect of this medicine. Avoid alcoholic drinks.  Your mouth may get dry. Chewing sugarless gum or sucking hard candy, and drinking plenty of water may help. Contact your doctor if the problem does not go away or is severe.  NOTE:This sheet is a summary. It may not cover all possible information. If you have questions about this medicine, talk to your doctor, pharmacist, or health care provider. Copyright  2019 [employer]           Patient Education     Hydroxyzine capsules or tablets  Brand Names: ANX, Atarax, Rezine, Vistaril  What is this medicine?  HYDROXYZINE (remigio DROX i zeen) is an antihistamine. This medicine is used to treat allergy symptoms. It is also used to treat anxiety  and tension. This medicine can be used with other medicines to induce sleep before surgery.  How should I use this medicine?  Take this medicine by mouth with a full glass of water. Follow the directions on the prescription label. You may take this medicine with food or on an empty stomach. Take your medicine at regular intervals. Do not take your medicine more often than directed.  Talk to your pediatrician regarding the use of this medicine in children. Special care may be needed. While this drug may be prescribed for children as young as 6 years of age for selected conditions, precautions do apply.  Patients over 65 years old may have a stronger reaction and need a smaller dose.  What side effects may I notice from receiving this medicine?  Side effects that you should report to your doctor or health care professional as soon as possible:    fast or irregular heartbeat    difficulty passing urine    seizures    slurred speech or confusion    tremor  Side effects that usually do not require medical attention (report to your doctor or health care professional if they continue or are bothersome):    constipation    drowsiness    fatigue    headache    stomach upset  What may interact with this medicine?    alcohol    barbiturate medicines for sleep or seizures    medicines for colds, allergies    medicines for depression, anxiety, or emotional disturbances    medicines for pain    medicines for sleep    muscle relaxants    What if I miss a dose?  If you miss a dose, take it as soon as you can. If it is almost time for your next dose, take only that dose. Do not take double or extra doses.  Where should I keep my medicine?  Keep out of the reach of children.  Store at room temperature between 15 and 30 degrees C (59 and 86 degrees F). Keep container tightly closed. Throw away any unused medicine after the expiration date.  What should I tell my health care provider before I take this medicine?  They need to know if  you have any of these conditions:    any chronic illness    difficulty passing urine    glaucoma    heart disease    kidney disease    liver disease    lung disease    an unusual or allergic reaction to hydroxyzine, cetirizine, other medicines, foods, dyes, or preservatives    pregnant or trying to get pregnant    breast-feeding  What should I watch for while using this medicine?  Tell your doctor or health care professional if your symptoms do not improve.  You may get drowsy or dizzy. Do not drive, use machinery, or do anything that needs mental alertness until you know how this medicine affects you. Do not stand or sit up quickly, especially if you are an older patient. This reduces the risk of dizzy or fainting spells. Alcohol may interfere with the effect of this medicine. Avoid alcoholic drinks.  Your mouth may get dry. Chewing sugarless gum or sucking hard candy, and drinking plenty of water may help. Contact your doctor if the problem does not go away or is severe.  This medicine may cause dry eyes and blurred vision. If you wear contact lenses you may feel some discomfort. Lubricating drops may help. See your eye doctor if the problem does not go away or is severe.  If you are receiving skin tests for allergies, tell your doctor you are using this medicine.  NOTE:This sheet is a summary. It may not cover all possible information. If you have questions about this medicine, talk to your doctor, pharmacist, or health care provider. Copyright  2019 [employer]               No follow-ups on file.    Lexii Coughlin MD  [facility]    Answers for HPI/ROS submitted by the patient on 12/19/2019   Chronic problems general questions HPI Form  If you checked off any problems, how difficult have these problems made it for you to do your work, take care of things at home, or get along with other people?: Somewhat difficult  PHQ9 TOTAL SCORE: 13  ELVIN 7 TOTAL SCORE: 10

## 2019-12-20 ASSESSMENT — ANXIETY QUESTIONNAIRES: GAD7 TOTAL SCORE: 10

## 2019-12-20 ASSESSMENT — PATIENT HEALTH QUESTIONNAIRE - PHQ9: SUM OF ALL RESPONSES TO PHQ QUESTIONS 1-9: 13

## 2020-01-08 ENCOUNTER — TELEPHONE (OUTPATIENT)
Dept: FAMILY MEDICINE | Facility: CLINIC | Age: 45
End: 2020-01-08

## 2020-01-08 NOTE — TELEPHONE ENCOUNTER
Patient calling and states she only was able to 3 RX's that provider sent.  States Dr also hand wrote other medications on her AVS.  Wants to know what was hand written on AVS.  Wanting to know names of medications for mucous and itchy throat.  Advised can try Delsym and Mucinex.  Gretta Flower RN

## 2020-01-31 ENCOUNTER — ANCILLARY PROCEDURE (OUTPATIENT)
Dept: ULTRASOUND IMAGING | Facility: CLINIC | Age: 45
End: 2020-01-31
Attending: OBSTETRICS & GYNECOLOGY
Payer: COMMERCIAL

## 2020-01-31 DIAGNOSIS — N83.201 RIGHT OVARIAN CYST: ICD-10-CM

## 2020-01-31 PROCEDURE — 76856 US EXAM PELVIC COMPLETE: CPT | Performed by: OBSTETRICS & GYNECOLOGY

## 2020-01-31 PROCEDURE — 76830 TRANSVAGINAL US NON-OB: CPT | Performed by: OBSTETRICS & GYNECOLOGY

## 2020-02-03 ENCOUNTER — OFFICE VISIT (OUTPATIENT)
Dept: OBGYN | Facility: CLINIC | Age: 45
End: 2020-02-03
Payer: COMMERCIAL

## 2020-02-03 VITALS
BODY MASS INDEX: 26.31 KG/M2 | SYSTOLIC BLOOD PRESSURE: 100 MMHG | HEIGHT: 60 IN | DIASTOLIC BLOOD PRESSURE: 72 MMHG | WEIGHT: 134 LBS

## 2020-02-03 DIAGNOSIS — N39.46 MIXED STRESS AND URGE URINARY INCONTINENCE: ICD-10-CM

## 2020-02-03 DIAGNOSIS — N81.11 CYSTOCELE, MIDLINE: Primary | ICD-10-CM

## 2020-02-03 DIAGNOSIS — N81.4 CYSTOCELE WITH SMALL RECTOCELE AND UTERINE DESCENT: ICD-10-CM

## 2020-02-03 PROCEDURE — 99214 OFFICE O/P EST MOD 30 MIN: CPT | Performed by: OBSTETRICS & GYNECOLOGY

## 2020-02-03 ASSESSMENT — MIFFLIN-ST. JEOR: SCORE: 1176.38

## 2020-02-03 NOTE — NURSING NOTE
"Chief Complaint   Patient presents with     Follow Up     U/S and possible prolaps       Initial /72   Ht 1.511 m (4' 11.5\")   Wt 60.8 kg (134 lb)   LMP 2020 (Approximate)   Breastfeeding No   BMI 26.61 kg/m   Estimated body mass index is 26.61 kg/m  as calculated from the following:    Height as of this encounter: 1.511 m (4' 11.5\").    Weight as of this encounter: 60.8 kg (134 lb).  BP completed using cuff size: regular    Questioned patient about current smoking habits.  Pt. has never smoked.          The following HM Due: NONE      Todd Serra MA               "

## 2020-02-03 NOTE — Clinical Note
Douglas Sevilla M.D. FACOGF[facility], [place]  [phone]    [phone]    FaxDear Dr Staley,       Thank you for the opportunity to see your patient. Please see my office visit notes for your review.  As always, I appreciate the opportunity to participate in your patient's care. Sincerely yours,Douglas Sevilla M.D.

## 2020-02-04 PROBLEM — N39.46 MIXED STRESS AND URGE URINARY INCONTINENCE: Status: ACTIVE | Noted: 2020-02-04

## 2020-02-04 PROBLEM — N81.4 CYSTOCELE WITH SMALL RECTOCELE AND UTERINE DESCENT: Status: ACTIVE | Noted: 2020-02-04

## 2020-02-04 NOTE — PROGRESS NOTES
The patient is a. 44 year old   female  condoms for contraception, not on HRT whom I am asked to see by Dr. Rory Staley for evaluation of uterine prolapse and mixed urinary incontinence which is been more prominent since the birth of her last child.  The patient and her  are adamant that they do not want any more children.  The patient states that she has very regular monthly menses but in November had vaginal bleeding  and then 2 weeks later had an episode of vaginal bleeding.  She said her  had given her medication for anxiety and she attributes the bleeding to this.  She is had no other unscheduled bleeding.  I reviewed the results and work-up of Dr. Staley for this concern.  She denies any history of cervical atypia or other related endocrinopathy.  The patient states that if she coughs or sneezes she can feel pelvic pressure and the cervix at the introitus.  The patient states that when she sits to void she is looked and can see the cervix at the introitus.  Pregnancy history includes  section x2, first was for failure to progress with the birth of an 8 pound 15 ounce infant and the second was a failed attempted trial of labor after .,  There was no instrumentation, and no known history of bladder or bowel injury. Patient complains of urinary leakage with coughing sneezing straining and requires her to wear constant protection.  She is unsure if she leaks with intercourse.  This is become a bother in her life and interferes with her daily activities.     Asthma: Denies  Smoking: Denies  Recurrent UTIs: Denies  Hematuria: Denies  Diabetes: Denies  Related medication usage:   Current Outpatient Medications   Medication     benzonatate (TESSALON) 100 MG capsule     escitalopram (LEXAPRO) 10 MG tablet     hydrOXYzine (ATARAX) 25 MG tablet     PRENAT VIT-FEPOLY-METHYLFOL-FA PO     No current facility-administered medications for this visit.        Incontinence  of stool or flatus: Denies  Symptoms of pelvic relaxation/prolapse: As above  Voiding or defacatory dysfunction: States that she uses a finger to aid with defecation and occasionally can leak with standing after voiding.  Otherwise feels that she is able to adequately empty  Previous evaluation: None  Kegel exercises: Did not help  Nocturia: 2-3  Fluids: No excessive fluid intake use of caffeinated products carbonation or alcohol  Urgency: Yes with occasional leakage    Past Medical History:   Diagnosis Date     Abnormal Pap smear      Herpes simplex without mention of complication      Left breast lump      Lump of right breast      Thyroid disorder     as a child unknown if hyper or hypo     Past Surgical History:   Procedure Laterality Date      SECTION N/A 2015    Procedure:  SECTION;  Surgeon: Yohana Monroy MD;  Location: [facility]      SECTION N/A 1/3/2017    Procedure:  SECTION;  Surgeon: Jerzy Camacho MD;  Location: [facility]     Family History   Problem Relation Age of Onset     Cerebrovascular Disease Father      Hypertension Mother      Cervical Cancer Mother      Social History     Socioeconomic History     Marital status:      Spouse name: Not on file     Number of children: Not on file     Years of education: Not on file     Highest education level: Not on file   Occupational History     Not on file   Social Needs     Financial resource strain: Not on file     Food insecurity:     Worry: Not on file     Inability: Not on file     Transportation needs:     Medical: Not on file     Non-medical: Not on file   Tobacco Use     Smoking status: Never Smoker     Smokeless tobacco: Never Used   Substance and Sexual Activity     Alcohol use: No     Alcohol/week: 0.0 standard drinks     Drug use: No     Sexual activity: Yes     Partners: Male     Birth control/protection: Condom   Lifestyle     Physical activity:     Days per week: Not on file     Minutes per  "session: Not on file     Stress: Not on file   Relationships     Social connections:     Talks on phone: Not on file     Gets together: Not on file     Attends [employer] service: Not on file     Active member of club or organization: Not on file     Attends meetings of clubs or organizations: Not on file     Relationship status: Not on file     Intimate partner violence:     Fear of current or ex partner: Not on file     Emotionally abused: Not on file     Physically abused: Not on file     Forced sexual activity: Not on file   Other Topics Concern     Parent/sibling w/ CABG, MI or angioplasty before 65F 55M? No   Social History Narrative    Caffeine intake/servings daily - 1    Calcium intake/servings daily - 3    Exercise 4 times weekly - describe walks    Sunscreen used - Yes    Seatbelts used - Yes    Guns stored in the home - No    Self Breast Exam - No    Pap test up to date -  No    Eye exam up to date -  No    Dental exam up to date -  No    DEXA scan up to date -  No    Flex Sig/Colonoscopy up to date -  No    Mammography up to date -  No    Immunizations reviewed and up to date - Yes    Abuse: Current or Past (Physical, Sexual or Emotional) - No    Do you feel safe in your environment - Yes    Do you cope well with stress - Yes    Do you suffer from insomnia - No    Last updated by: Viktoria Houser  5/30/2014         Vitals: /72   Ht 1.511 m (4' 11.5\")   Wt 60.8 kg (134 lb)   LMP 01/19/2020 (Approximate)   Breastfeeding No   BMI 26.61 kg/m    BMI= Body mass index is 26.61 kg/m .    Constitutional: healthy, alert and no distress  Head: Normocephalic. No masses, lesions, tenderness or abnormalities  Neck: Neck supple. No adenopathy. Thyroid symmetric, normal size,, Carotids without bruits.  ENT: NEGATIVE for ear, mouth and throat problems  Cardiovascular: negative, PMI normal. No lifts, heaves, or thrills. RRR. No murmurs, clicks gallops or rub  Respiratory: negative, Percussion normal. " Good diaphragmatic excursion. Lungs clear  Gastrointestinal: Abdomen soft, non-tender. BS normal. No masses, organomegaly  Genitourinary: Normal external genitalia without lesions and greater than 30 degrees of UV angle hypermobility.  There was leakage of urine with coughing and straining that resolved with correction of the defect.  Speculum exam grade 1 midline cystocele with grade 2 uterine descent to the antritis.  Multiparous appearing cervix no lesions seen, bimanual exam the uterus is parous mobile smooth firm adnexa without masses enlargement or tenderness, rectovaginal exam normal sphincter tone minimal grade 1 distal rectocele  Musculoskeletalextremities normal- no gross deformities noted, gait normal and normal muscle tone  Neurologic: Gait normal. Reflexes normal and symmetric. Sensation grossly WNL.  Psychiatric: mentation appears normal and affect normal/bright    (N81.11) Cystocele, midline  (primary encounter diagnosis)  Comment: I reviewed the findings of a midline cystocele with the patient at length and discussed with her the risk benefits and alternative forms of therapy including a conservative wait-and-see approach, the use of a pessary or surgical repair options.  In light of the fact the cystocele is mild and the hypermobility of the urethrovesical angle with subsequent mixed urinary incontinence is more of a concern I discussed doing a urodynamic study with possible consideration of a retropubic tension-free vaginal tape sling.  I gave the patient a detailed written outline of our discussion as well as patient education information  Plan: Done    (N81.4) Cystocele with small rectocele and uterine descent  Comment: I reviewed the findings with the patient again at length discussed the risk benefits and alternatives including expectant management, the use of a pessary or surgical repair  Plan: I gave the patient a [facility] patient education information pamphlets.  I recommended proceeding with a  urodynamic study with consideration of a possible TLH with bilateral salpingectomy with uterosacral ligament vault suspension and retropubic sling placement with cystoscopy.  I will further address this at the time of her urodynamic study    (N39.46) Mixed stress and urge urinary incontinence  Comment: As above  Plan: Patient will schedule urodynamics with appropriate therapy to follow written plan given    .    Douglas Sevilla M.D.

## 2020-02-04 NOTE — PATIENT INSTRUCTIONS
You can reach your [facility] Care Team any time of the day by calling [phone]. This number will put you in touch with the 24 hour nurse line if the clinic is closed.    To contact your OB/GYN Station Coordinator/Surgery Scheduler please call [phone]. This is a direct number for your care team between 8 a.m. and 4 p.m. Monday through Friday.    [facility] is open for your convenience:  Monday through Friday 8 a.m. to 6 p.m.  Closed weekends and all major holidays.

## 2020-08-17 ENCOUNTER — VIRTUAL VISIT (OUTPATIENT)
Dept: URGENT CARE | Facility: CLINIC | Age: 45
End: 2020-08-17
Payer: COMMERCIAL

## 2020-08-17 DIAGNOSIS — Z20.822 EXPOSURE TO COVID-19 VIRUS: Primary | ICD-10-CM

## 2020-08-17 PROCEDURE — 99213 OFFICE O/P EST LOW 20 MIN: CPT | Mod: TEL | Performed by: NURSE PRACTITIONER

## 2020-08-18 NOTE — PROGRESS NOTES
"  Katie Wilson is a 45 year old female who is being evaluated via a billable telephone visit.      The patient has been notified of following:     \"This telephone visit will be conducted via a call between you and your physician/provider. We have found that certain health care needs can be provided without the need for a physical exam.  This service lets us provide the care you need with a short phone conversation.  If a prescription is necessary we can send it directly to your pharmacy.  If lab work is needed we can place an order for that and you can then stop by our lab to have the test done at a later time.    If during the course of the call the physician/provider feels a telephone visit is not appropriate, you will not be charged for this service.\"     Patient has given verbal consent for Telephone visit?  Yes    Chief Complaint:    Chief Complaint   Patient presents with     Covid 19 Testing       HPI: Katie Wilson is an 45 year old female who calls with concerns that include exposure to COVID 19 by her daughter's therapist. She is asymptomatic at this time.      ROS:      A 10 point ROS is negative except as expressed in HPI.    Past Medical History  Past Medical History:   Diagnosis Date     Abnormal Pap smear      Herpes simplex without mention of complication      Left breast lump      Lump of right breast      Thyroid disorder     as a child unknown if hyper or hypo        Family History   Family History   Problem Relation Age of Onset     Cerebrovascular Disease Father      Hypertension Mother      Cervical Cancer Mother        Social History  Social History     Socioeconomic History     Marital status:      Spouse name: Not on file     Number of children: Not on file     Years of education: Not on file     Highest education level: Not on file   Occupational History     Not on file   Social Needs     Financial resource strain: Not on file     Food insecurity     Worry: Not on file     " Inability: Not on file     Transportation needs     Medical: Not on file     Non-medical: Not on file   Tobacco Use     Smoking status: Never Smoker     Smokeless tobacco: Never Used   Substance and Sexual Activity     Alcohol use: No     Alcohol/week: 0.0 standard drinks     Drug use: No     Sexual activity: Yes     Partners: Male     Birth control/protection: Condom   Lifestyle     Physical activity     Days per week: Not on file     Minutes per session: Not on file     Stress: Not on file   Relationships     Social connections     Talks on phone: Not on file     Gets together: Not on file     Attends Rastafarian service: Not on file     Active member of club or organization: Not on file     Attends meetings of clubs or organizations: Not on file     Relationship status: Not on file     Intimate partner violence     Fear of current or ex partner: Not on file     Emotionally abused: Not on file     Physically abused: Not on file     Forced sexual activity: Not on file   Other Topics Concern     Parent/sibling w/ CABG, MI or angioplasty before 65F 55M? No   Social History Narrative    Caffeine intake/servings daily - 1    Calcium intake/servings daily - 3    Exercise 4 times weekly - describe walks    Sunscreen used - Yes    Seatbelts used - Yes    Guns stored in the home - No    Self Breast Exam - No    Pap test up to date -  No    Eye exam up to date -  No    Dental exam up to date -  No    DEXA scan up to date -  No    Flex Sig/Colonoscopy up to date -  No    Mammography up to date -  No    Immunizations reviewed and up to date - Yes    Abuse: Current or Past (Physical, Sexual or Emotional) - No    Do you feel safe in your environment - Yes    Do you cope well with stress - Yes    Do you suffer from insomnia - No    Last updated by: Viktoria Houser  2014            Surgical History:  Past Surgical History:   Procedure Laterality Date      SECTION N/A 2015    Procedure:  SECTION;   Surgeon: Yohana Monroy MD;  Location: [facility]      SECTION N/A 1/3/2017    Procedure:  SECTION;  Surgeon: Jerzy Camacho MD;  Location: [facility]          Allergies:  No Known Allergies     Current Meds:    Current Outpatient Medications:      benzonatate (TESSALON) 100 MG capsule, Take 1 capsule (100 mg) by mouth 3 times daily as needed for cough (Patient not taking: Reported on 2/3/2020), Disp: 30 capsule, Rfl: 0     escitalopram (LEXAPRO) 10 MG tablet, Take 1 tablet (10 mg) by mouth daily (Patient not taking: Reported on 2/3/2020), Disp: 30 tablet, Rfl: 1     hydrOXYzine (ATARAX) 25 MG tablet, Take 1 tablet (25 mg) by mouth every 6 hours as needed for anxiety (Patient not taking: Reported on 2/3/2020), Disp: 30 tablet, Rfl: 1     PRENAT VIT-FEPOLY-METHYLFOL-FA PO, , Disp: , Rfl:      PHYSICAL EXAM:     Patient is alert and oriented. Able to speak in full sentences. No wheezing or cough heard during telephone conversation. Mood is appropriate.        ASSESSMENT:       ICD-10-CM    1. Exposure to COVID-19 virus  Z20.828          Worrisome symptoms discussed with instructions to go to the ED.  Patient verbalized understanding and agreed with this plan.     Yane Ngo CNP  2020, 7:22 PM      Phone call duration:  14 minutes

## 2020-08-19 DIAGNOSIS — Z20.822 EXPOSURE TO COVID-19 VIRUS: ICD-10-CM

## 2020-08-19 PROCEDURE — U0003 INFECTIOUS AGENT DETECTION BY NUCLEIC ACID (DNA OR RNA); SEVERE ACUTE RESPIRATORY SYNDROME CORONAVIRUS 2 (SARS-COV-2) (CORONAVIRUS DISEASE [COVID-19]), AMPLIFIED PROBE TECHNIQUE, MAKING USE OF HIGH THROUGHPUT TECHNOLOGIES AS DESCRIBED BY CMS-2020-01-R: HCPCS | Performed by: NURSE PRACTITIONER

## 2020-08-21 LAB
SARS-COV-2 RNA SPEC QL NAA+PROBE: ABNORMAL
SPECIMEN SOURCE: ABNORMAL

## 2020-08-22 ENCOUNTER — VIRTUAL VISIT (OUTPATIENT)
Dept: FAMILY MEDICINE | Facility: OTHER | Age: 45
End: 2020-08-22

## 2020-08-22 ENCOUNTER — TELEPHONE (OUTPATIENT)
Dept: FAMILY MEDICINE | Facility: CLINIC | Age: 45
End: 2020-08-22

## 2020-08-22 NOTE — TELEPHONE ENCOUNTER
"Coronavirus (COVID-19) Notification    Caller Name (Patient, parent, daughter/son, grandparent, etc)  Katie Wilson    Reason for call  Notify of Positive Coronavirus (COVID-19) lab results, assess symptoms,  review [facility] recommendations    Lab Result    Lab test:  2019-nCoV rRt-PCR or SARS-CoV-2 PCR    Oropharyngeal AND/OR nasopharyngeal swabs is POSITIVE for 2019-nCoV RNA/SARS-COV-2 PCR (COVID-19 virus)    RN Recommendations/Instructions per [facility] Coronavirus COVID-19 recommendations    Brief introduction script  Introduce self then review script:  \"I am calling on behalf of [email or web address].  We were notified that your Coronavirus test (COVID-19) for was POSITIVE for the virus.  I have some information to relay to you but first I wanted to mention that the [place] Dept of Health will be contacting you shortly [it's possible MD already called Patient] to talk to you more about how you are feeling and other people you have had contact with who might now also have the virus.  Also, [facility] is Partnering with the [facility] for Covid-19 research, you may be contacted directly by research staff.\"    Assessment (Inquire about Patient's current symptoms)   Assessment   Current Symptoms at time of phone call: (if no symptoms, document No symptoms] Fatigue, sore throat, runny nose   Symptoms onset (if applicable) Onset 8/18/20     If at time of call, Patients symptoms hare worsened, the Patient should contact 911 or have someone drive them to Emergency Dept promptly:      If Patient calling 911, inform 911 personal that you have tested positive for the Coronavirus (COVID-19).  Place mask on and await 911 to arrive.    If Emergency Dept, If possible, please have another adult drive you to the Emergency Dept but you need to wear mask when in contact with other people.      Review information with Patient    Your result was positive. This means you have COVID-19 (coronavirus).  We have " sent you a letter that reviews the information that I'll be reviewing with you now.    How can I protect others?    If you have symptoms: stay home and away from others (self-isolate) until:    You've had no fever--and no medicine that reduces fever--for 3 full days (72 hours). And      Your other symptoms have gotten better. For example, your cough or breathing has improved. And     At least 10 days have passed since your symptoms started.    If you don't have symptoms: Stay home and away from others (self-isolate) until at least 10 days have passed since your first positive COVID-19 test. (Date test collected)    During this time:    Stay in your own room, including for meals. Use your own bathroom if you can.    Stay away from others in your home. No hugging, kissing or shaking hands. No visitors.     Don't go to work, school or anywhere else.     Clean  high touch  surfaces often (doorknobs, counters, handles, etc.). Use a household cleaning spray or wipes. You'll find a full list on the EPA website at www.epa.gov/pesticide-registration/list-n-disinfectants-use-against-sars-cov-2.     Cover your mouth and nose with a mask, tissue or washcloth to avoid spreading germs.    Wash your hands and face often with soap and water.    Caregivers in these groups are at risk for severe illness due to COVID-19:  o People 65 years and older  o People who live in a nursing home or long-term care facility  o People with chronic disease (lung, heart, cancer, diabetes, kidney, liver, immunologic)  o People who have a weakened immune system, including those who:  - Are in cancer treatment  - Take medicine that weakens the immune system, such as corticosteroids  - Had a bone marrow or organ transplant  - Have an immune deficiency  - Have poorly controlled HIV or AIDS  - Are obese (body mass index of 40 or higher)  - Smoke regularly    Caregivers should wear gloves while washing dishes, handling laundry and cleaning bedrooms and  bathrooms.    Wash and dry laundry with special caution. Don't shake dirty laundry, and use the warmest water setting you can.    If you have a weakened immune system, ask your doctor about other actions you should take.    For more tips, go to www.cdc.gov/coronavirus/2019-ncov/downloads/10Things.pdf.    You should not go back to work until you meet the guidelines above for ending your home isolation. You should meet these along with any other guidelines that your employer has.    Employers: This document serves as formal notice of your employee's medical guidelines for going back to work. They must meet the above guidelines before going back to work in person.    How can I take care of myself?    1. Get lots of rest. Drink extra fluids (unless a doctor has told you not to).    2. Take Tylenol (acetaminophen) for fever or pain. If you have liver or kidney problems, ask your family doctor if it's okay to take Tylenol.     Take either:     650 mg (two 325 mg pills) every 4 to 6 hours, or     1,000 mg (two 500 mg pills) every 8 hours as needed.     Note: Don't take more than 3,000 mg in one day. Acetaminophen is found in many medicines (both prescribed and over-the-counter medicines). Read all labels to be sure you don't take too much.    For children, check the Tylenol bottle for the right dose (based on their age or weight).    3. If you have other health problems (like cancer, heart failure, an organ transplant or severe kidney disease): Call your specialty clinic if you don't feel better in the next 2 days.    4. Know when to call 911: Emergency warning signs include:    Trouble breathing or shortness of breath    Pain or pressure in the chest that doesn't go away    Feeling confused like you haven't felt before, or not being able to wake up    Bluish-colored lips or face    5. Sign up for [employer]. We know it's scary to hear that you have COVID-19. We want to track your symptoms to make sure you're okay over  the next 2 weeks. Please look for an email from [employer]--this is a free, online program that we'll use to keep in touch. To sign up, follow the link in the email. Learn more at [email or web address].    Where can I get more information?    [facility]: [email or web address]    Coronavirus Basics: www.[email or web address]./diseases/coronavirus/basics.html    What to Do If You're Sick: www.cdc.gov/coronavirus/2019-ncov/about/steps-when-sick.html    Ending Home Isolation: www.cdc.gov/coronavirus/2019-ncov/hcp/disposition-in-home-patients.html     Caring for Someone with COVID-19: www.cdc.gov/coronavirus/2019-ncov/if-you-are-sick/care-for-someone.html     [facility] clinical trials (COVID-19 research studies): clinicalaffairs.[facility]-clinical-trials     A Positive COVID-19 letter will be sent via [employer] or the mail.    [Name]  Analy Devine RN

## 2020-08-22 NOTE — PROGRESS NOTES
"Date: 2020 01:30:06  Clinician: Tanya Ramon  Clinician NPI: [id number]  Patient: Katie Wilson  Patient : 1975  Patient Address: [address] [place] [address]  Patient Phone: [phone]  Visit Protocol: URI  Patient Summary:  Katie is a 45 year old ( : 1975 ) female who initiated a Visit for COVID-19 (Coronavirus) evaluation and screening. When asked the question \"Please sign me up to receive news, health information and promotions. \", Katie responded \"No\".    Katie states her symptoms started gradually 3-4 days ago.   Her symptoms consist of chills, malaise, a cough, nasal congestion, rhinitis, and myalgia.   Symptom details     Nasal secretions: The color of her mucus is clear.    Cough: Katie coughs a few times an hour and her cough is not more bothersome at night. Phlegm comes into her throat when she coughs. She believes her cough is caused by post-nasal drip. The color of the phlegm is clear.      Katie denies having ear pain, headache, enlarged lymph nodes, fever, wheezing, sore throat, teeth pain, ageusia, diarrhea, vomiting, nausea, anosmia, and facial pain or pressure. She also denies having recent facial or sinus surgery in the past 60 days, taking antibiotic medication in the past month, and double sickening (worsening symptoms after initial improvement). She is not experiencing dyspnea.   Precipitating events  She has not recently been exposed to someone with influenza. Katie has been in close contact with the following high risk individuals: people with asthma, heart disease or diabetes and children under the age of 5.   Pertinent COVID-19 (Coronavirus) information  In the past 14 days, Katie has not worked in a congregate living setting.   She does not work or volunteer as healthcare worker or a  and does not work or volunteer in a healthcare facility.   Katie also has not lived in a congregate living setting in the past 14 days. She " does not live with a healthcare worker.   Katie has had a close contact with a laboratory-confirmed COVID-19 patient within 14 days of symptom onset.   Since December 2019, Katie and has not had upper respiratory infection or influenza-like illness. has been diagnosed with lab-confirmed COVID-19 test    Date of her positive COVID-19 test: 08/18/2020    Pertinent medical history  Katie does not get yeast infections when she takes antibiotics.   Katie does not need a return to work/school note.   Weight: 140 lbs   Katie does not smoke or use smokeless tobacco.   She denies pregnancy and denies breastfeeding. She has menstruated in the past month.   Weight: 140 lbs    MEDICATIONS: No current medications, ALLERGIES: NKDA  Clinician Response:  Dear Katie,  Your health is our priority. Based on the information you have provided, it is possible that you may have some type of viral infection.  Please read the full treatment plan and see my recommendations below.  Medication information  Because you have a viral infection, antibiotics will not help you get better. Treating a viral infection with antibiotics could actually make you feel worse.  Self care  Steps you can take to be as comfortable as possible:     Rest.    Drink plenty of fluids.    Take a warm shower to loosen congestion    Use a cool-mist humidifier.    Take a spoonful of honey to reduce your cough.     Additional treatment plan   Your symptoms show that you may have coronavirus (COVID-19). This illness can cause fever, cough and trouble breathing. Many people get a mild case and get better on their own. Some people can get very sick.  Based on the symptoms you have shared, I would like you to be re-checked in 2 to 3 days. Please call your family clinic to set up a video or phone visit.  Will I be tested for COVID-19?  We would like to test you for this virus.   Please call [phone] to schedule your visit. Explain that you were referred by [facility] to  "have a COVID-19 test. Be ready to share your [facility] visit ID number.   The following will serve as your written order for this COVID Test, ordered by me, for the indication of suspected COVID [Z20.828]: The test will be ordered in [employer], our electronic health record, after you are scheduled. It will show as ordered and authorized by Saud Mac MD.  Order: COVID-19 (Coronavirus) PCR for SYMPTOMATIC [facility] from [facility].  1.When it's time for your COVID test:   Stay at least 6 feet away from others. (If someone will drive you to your test, stay in the backseat, as far away from the  as you can.)   Cover your mouth and nose with a mask, tissue or washcloth.  Go straight to the testing site. Don't make any stops on the way there or back.      2.Starting now: Stay home and away from others (self-isolate) until:   You've had no fever---and no medicine that reduces fever---for one full day (24 hours). And...   Your other symptoms have gotten better. For example, your cough or breathing has improved. And...   At least 10 days have passed since your symptoms started.       During this time, don't leave the house except for testing or medical care.   Stay in your own room, even for meals. Use your own bathroom if you can.   Stay away from others in your home. No hugging, kissing or shaking hands. No visitors.  Don't go to work, school or anywhere else.    Clean \"high touch\" surfaces often (doorknobs, counters, handles, etc.). Use a household cleaning spray or wipes. You'll find a full list of  on the EPA website: www.epa.gov/pesticide-registration/list-n-disinfectants-use-against-sars-cov-2.   Cover your mouth and nose with a mask, tissue or washcloth to avoid spreading germs.  Wash your hands and face often. Use soap and water.  Caregivers in these groups are at risk for severe illness due to COVID-19:  o People 65 years and older  o People who live in a nursing home or long-term care facility  o People with " chronic disease (lung, heart, cancer, diabetes, kidney, liver, immunologic)   o People who have a weakened immune system, including those who:   Are in cancer treatment  Take medicine that weakens the immune system, such as corticosteroids  Had a bone marrow or organ transplant  Have an immune deficiency  Have poorly controlled HIV or AIDS  Are obese (body mass index of 40 or higher)  Smoke regularly   o Caregivers should wear gloves while washing dishes, handling laundry and cleaning bedrooms and bathrooms.  o Use caution when washing and drying laundry: Don't shake dirty laundry, and use the warmest water setting that you can.  o For more tips, go to www.cdc.gov/coronavirus/2019-ncov/downloads/10Things.pdf.      How can I take care of myself?   Get lots of rest. Drink extra fluids (unless a doctor has told you not to)   Take Tylenol (acetaminophen) for fever or pain. If you have liver or kidney problems, ask your family doctor if it's okay to take Tylenol.   Adults can take either:    650 mg (two 325 mg pills) every 4 to 6 hours, or...   1,000 mg (two 500 mg pills) every 8 hours as needed.    Note: Don't take more than 3,000 mg in one day. Acetaminophen is found in many medicines (both prescribed and over-the-counter medicines). Read all labels to be sure you don't take too much.   For children, check the Tylenol bottle for the right dose. The dose is based on the child's age or weight.    If you have other health problems (like cancer, heart failure, an organ transplant or severe kidney disease): Call your specialty clinic if you don't feel better in the next 2 days.       Know when to call 911. Emergency warning signs include:    Trouble breathing or shortness of breath Pain or pressure in the chest that doesn't go away Feeling confused like you haven't felt before, or not being able to wake up Bluish-colored lips or face  Where can I get more information?   [facility] -- About COVID-19:  [email or web address]   CDC -- What to Do If You're Sick: www.cdc.gov/coronavirus/2019-ncov/about/steps-when-sick.html   CDC -- Ending Home Isolation: www.cdc.gov/coronavirus/2019-ncov/hcp/disposition-in-home-patients.html   CDC -- Caring for Someone: www.cdc.gov/coronavirus/2019-ncov/if-you-are-sick/care-for-someone.html   [facility] -- Interim Guidance for Hospital Discharge to Home: www.health.[facility].[place]./diseases/coronavirus/hcp/hospdischarge.pdf   [facility] clinical trials (COVID-19 research studies): clinicalaffairs.[facility]-clinical-trials    Below are the COVID-19 hotlines at the [place] Department of Health ([facility]). Interpreters are available.    For health questions: Call 836-815-4777 or 1-928.969.9999 (7 a.m. to 7 p.m.) For questions about schools and childcare: Call 211-932-7307 or 1-649.531.5402 (7 a.m. to 7 p.m.)       COVID-19 (Coronavirus) General Information  Because there is currently no vaccine to prevent infection, the best way to protect yourself is to avoid being exposed to this virus. Common symptoms of COVID-19 include but are not limited to fever, cough, and shortness of breath. These symptoms appear 2-14 days after you are exposed to the virus that causes COVID-19. Click here for more information from the CDC on how to protect yourself.  If you are sick with COVID-19 or suspect you are infected with the virus that causes COVID-19, follow the steps here from the CDC to help prevent the disease from spreading to people in your home and community.  Click here for general information from the CDC on testing.  If you develop any of these emergency warning signs for COVID-19, get medical attention immediately:     Trouble breathing    Persistent pain or pressure in the chest    New confusion or inability to arouse    Bluish lips or face      Call your doctor or clinic before going in. Call [phone] if you have a medical emergency and notify the  you have or think you may  have COVID-19.  For more detailed and up to date information on COVID-19 (Coronavirus), please visit the CDC website.   Diagnosis: COVID-19  Diagnosis ICD: U07.1

## 2020-10-08 ENCOUNTER — ALLIED HEALTH/NURSE VISIT (OUTPATIENT)
Dept: OBGYN | Facility: CLINIC | Age: 45
End: 2020-10-08
Payer: COMMERCIAL

## 2020-10-08 VITALS — BODY MASS INDEX: 26.61 KG/M2 | WEIGHT: 134 LBS | SYSTOLIC BLOOD PRESSURE: 110 MMHG | DIASTOLIC BLOOD PRESSURE: 72 MMHG

## 2020-10-08 DIAGNOSIS — N39.46 MIXED URINARY INCONTINENCE DUE TO FEMALE GENITAL PROLAPSE: Primary | ICD-10-CM

## 2020-10-08 DIAGNOSIS — N81.9 MIXED URINARY INCONTINENCE DUE TO FEMALE GENITAL PROLAPSE: Primary | ICD-10-CM

## 2020-10-08 DIAGNOSIS — N81.4 CYSTOCELE WITH SMALL RECTOCELE AND UTERINE DESCENT: ICD-10-CM

## 2020-10-08 PROCEDURE — 51784 ANAL/URINARY MUSCLE STUDY: CPT | Performed by: OBSTETRICS & GYNECOLOGY

## 2020-10-08 PROCEDURE — 51741 ELECTRO-UROFLOWMETRY FIRST: CPT | Performed by: OBSTETRICS & GYNECOLOGY

## 2020-10-08 PROCEDURE — 51729 CYSTOMETROGRAM W/VP&UP: CPT | Performed by: OBSTETRICS & GYNECOLOGY

## 2020-10-08 PROCEDURE — 99214 OFFICE O/P EST MOD 30 MIN: CPT | Mod: 25 | Performed by: OBSTETRICS & GYNECOLOGY

## 2020-10-08 PROCEDURE — 51797 INTRAABDOMINAL PRESSURE TEST: CPT | Performed by: OBSTETRICS & GYNECOLOGY

## 2020-10-08 RX ORDER — NITROFURANTOIN 25; 75 MG/1; MG/1
100 CAPSULE ORAL 2 TIMES DAILY
Qty: 6 CAPSULE | Refills: 0 | Status: SHIPPED | OUTPATIENT
Start: 2020-10-08 | End: 2020-10-11

## 2020-10-08 NOTE — NURSING NOTE
"Chief Complaint   Patient presents with     Urodynamic Study       Initial /72   Wt 60.8 kg (134 lb)   BMI 26.61 kg/m   Estimated body mass index is 26.61 kg/m  as calculated from the following:    Height as of 2/3/20: 1.511 m (4' 11.5\").    Weight as of this encounter: 60.8 kg (134 lb).  BP completed using cuff size: regular    Questioned patient about current smoking habits.  Pt. has never smoked.          The following HM Due: NONE      The following patient reported/Care Every where data was sent to:  P ABSTRACT QUALITY INITIATIVES [[id number]]        Becki Mays [facility]                 "

## 2020-10-08 NOTE — Clinical Note
Douglas Sevilla M.D. [address] [address] [phone] phone  [phone] fax       Dear Rory,         Thank you for the opportunity to see your patient. Please see my office visit notes for your review.  As always, I appreciate the opportunity to participate in your patient's care.     Sincerely yours,    Douglas Sevilla M.D.

## 2020-10-08 NOTE — PROGRESS NOTES
The patient is a. 44 year old   female  condoms for contraception, not on HRT whom I am asked to see by Dr. Rory Staley for evaluation of uterine prolapse and mixed urinary incontinence which is been more prominent since the birth of her last child.  The patient and her  are adamant that they do not want any more children.  The patient states that she has very regular monthly menses but in November had vaginal bleeding  and then 2 weeks later had an episode of vaginal bleeding.  She said her  had given her medication for anxiety and she attributes the bleeding to this.  She is had no other unscheduled bleeding.  I reviewed the results and work-up of Dr. Staley for this concern.  She denies any history of cervical atypia or other related endocrinopathy.  The patient states that if she coughs or sneezes she can feel pelvic pressure and the cervix at the introitus.  The patient states that when she sits to void she is looked and can see the cervix at the introitus.  Pregnancy history includes  section x2, first was for failure to progress with the birth of an 8 pound 15 ounce infant and the second was a failed attempted trial of labor after .,  There was no instrumentation, and no known history of bladder or bowel injury. Patient complains of urinary leakage with coughing sneezing straining and requires her to wear constant protection.  She is unsure if she leaks with intercourse.  This is become a bother in her life and interferes with her daily activities.      Asthma: Denies  Smoking: Denies  Recurrent UTIs: Denies  Hematuria: Denies  Diabetes: Denies  Related medication usage:       Current Outpatient Medications   Medication     benzonatate (TESSALON) 100 MG capsule     escitalopram (LEXAPRO) 10 MG tablet     hydrOXYzine (ATARAX) 25 MG tablet     PRENAT VIT-FEPOLY-METHYLFOL-FA PO      No current facility-administered medications for this visit.           Incontinence of stool or flatus: Denies  Symptoms of pelvic relaxation/prolapse: As above  Voiding or defacatory dysfunction: States that she uses a finger to aid with defecation and occasionally can leak with standing after voiding.  Otherwise feels that she is able to adequately empty  Previous evaluation: None  Kegel exercises: Did not help  Nocturia: 2-3  Fluids: No excessive fluid intake use of caffeinated products carbonation or alcohol  Urgency: Yes with occasional leakage     Past Medical History:   Diagnosis Date     Abnormal Pap smear      Herpes simplex without mention of complication      Left breast lump      Lump of right breast      Thyroid disorder     as a child unknown if hyper or hypo     Past Surgical History:   Procedure Laterality Date      SECTION N/A 2015    Procedure:  SECTION;  Surgeon: Yohana Monroy MD;  Location: [facility] L+[facility]      SECTION N/A 1/3/2017    Procedure:  SECTION;  Surgeon: Jerzy Camacho MD;  Location: [facility] L+D     /72   Wt 60.8 kg (134 lb)   BMI 26.61 kg/m    Constitutional: healthy, alert and no distress  Genitourinary: Normal external genitalia without lesions and greater than 30 degrees of UV angle hypermobility.  There was leakage of urine with coughing and straining that resolved with correction of the defect.  Speculum exam grade 1 midline cystocele with grade 2 uterine descent to the antritis.  Multiparous appearing cervix no lesions seen, bimanual exam the uterus is parous mobile smooth firm adnexa without masses enlargement or tenderness, rectovaginal exam normal sphincter tone minimal grade 1 distal rectocele    Urodynamic Summary:    Uroflow:        Voided Volume-251 cc.  Max Flow Rate-22.4 cc/sec.  Avg. Flow Rate-12.1 cc/sec.  Post Void Residual-1 cc.        1st sensation-55 cc.  1st desire-72 cc  Strong  Desire-151 cc  Bladder capacity-252 cc      Pdet-maximal detrusor pressure 18.  Patient had involuntary  loss of urine that she was able to overcome.  I did see evidence of detrussor instability.  Other than above issues otherwise normal bladder compliance    Max UCP-56.  VLPP-greater than 75.  Today I did not see evidence of intrinsic sphincter deficiency.  There was incontinence of urine with cough and valsalva that has resolved with correction of this defect at 275 cc.  Leakage with cough and Valsalva was more prominent when I restored apical compartment support    Assess:      Modest bladder capacity that I believe contributes to her urgency frequency.  There was evidence of detrusor instability that I believe also aggravates her symptoms      Otherwise normal uroflow.      I did not see evidence of voiding dysfunction or obstruction with adequate emptying.       There is evidence of pelvic floor relaxation with a symptomatic uterine prolapse cystocele hypermobility of the urethrovesical angle with subsequent urinary stress incontinence with overactive bladder urgency frequency and urge incontinence  Risks, benefits, and alternative modes of therapy discussed at length. Pathophysiology of the disease process reviewed, all of the patients questions answered and informed consent obtained.  The findings of pelvic floor relaxation including Cystocele, Uterine Descent, Rectocele and mixed incontinence were discussed at length as were the risks, benefits, and alternative modes of treatment.  We discussed a conservative wait-and-see approach including timed voidings pelvic floor strengthening elimination of bladder irritants and consideration of an antimuscarinic medication versus consideration of a pessary as well as the options of conventional repairs, consisting of total laparoscopic hysterectomy bilateral salpingectomy retropubic tension-free vaginal tape sling and cystoscopy with uterosacral ligament vaginal vault suspension, the success rates, post op pain, dysparunea, voiding or defacatory dysfunction as well as  injury to other organs, bleeding or infection.  We discussed success rates in relation to mixed incontinence and the potential need for adjunct therapy postoperatively should a retropubic tension-free sling not adequately control her incontinence.  I gave her a detailed written outline of our discussion today as well as patient education information.  I think the patient has a good understanding.  At this point the patient would like to try a pessary and will schedule an appointment for this  I think the patient has a good understanding. She has been given patient education information, all of her questions have been answered and informed consent obtained.      Plan:     Recommend as above         post urodynamic testing care sheet and an Rx for antibiotic prophylaxis      given

## 2020-10-08 NOTE — PATIENT INSTRUCTIONS
You can reach your [place] Care Team any time of the day by calling [phone]. This number will put you in touch with the 24 hour nurse line if the clinic is closed.    To contact your OB/GYN Station Coordinator/Surgery Scheduler please call [phone]. This is a direct number for your care team between 8 a.m. and 4 p.m. Monday through Friday.    [facility] is open for your convenience:  Monday through Friday 8 a.m. to 6 p.m.  Closed weekends and all major holidays.

## 2020-10-15 ENCOUNTER — OFFICE VISIT (OUTPATIENT)
Dept: OBGYN | Facility: CLINIC | Age: 45
End: 2020-10-15
Payer: COMMERCIAL

## 2020-10-15 VITALS — DIASTOLIC BLOOD PRESSURE: 70 MMHG | BODY MASS INDEX: 26.41 KG/M2 | WEIGHT: 133 LBS | SYSTOLIC BLOOD PRESSURE: 110 MMHG

## 2020-10-15 DIAGNOSIS — N39.46 MIXED STRESS AND URGE URINARY INCONTINENCE: ICD-10-CM

## 2020-10-15 DIAGNOSIS — Z23 NEED FOR PROPHYLACTIC VACCINATION AND INOCULATION AGAINST INFLUENZA: Primary | ICD-10-CM

## 2020-10-15 DIAGNOSIS — N81.4 CYSTOCELE WITH SMALL RECTOCELE AND UTERINE DESCENT: ICD-10-CM

## 2020-10-15 PROCEDURE — 99213 OFFICE O/P EST LOW 20 MIN: CPT | Mod: 25 | Performed by: OBSTETRICS & GYNECOLOGY

## 2020-10-15 PROCEDURE — 90471 IMMUNIZATION ADMIN: CPT | Performed by: OBSTETRICS & GYNECOLOGY

## 2020-10-15 PROCEDURE — 57160 INSERT PESSARY/OTHER DEVICE: CPT | Performed by: OBSTETRICS & GYNECOLOGY

## 2020-10-15 PROCEDURE — A4562 PESSARY, NON RUBBER,ANY TYPE: HCPCS | Performed by: OBSTETRICS & GYNECOLOGY

## 2020-10-15 PROCEDURE — 90686 IIV4 VACC NO PRSV 0.5 ML IM: CPT | Performed by: OBSTETRICS & GYNECOLOGY

## 2020-10-15 NOTE — NURSING NOTE
"Chief Complaint   Patient presents with     Pessary Check/Fit/Insert     Imm/Inj     Flu Shot       Initial /70   Wt 60.3 kg (133 lb)   BMI 26.41 kg/m   Estimated body mass index is 26.41 kg/m  as calculated from the following:    Height as of 2/3/20: 1.511 m (4' 11.5\").    Weight as of this encounter: 60.3 kg (133 lb).  BP completed using cuff size: regular    Questioned patient about current smoking habits.  Pt. has never smoked.          The following HM Due: NONE      The following patient reported/Care Every where data was sent to:  P ABSTRACT QUALITY INITIATIVES [[id number]]        Becki Mays [facility]                 "

## 2020-10-15 NOTE — PATIENT INSTRUCTIONS
You can reach your [place] Care Team any time of the day by calling [phone]. This number will put you in touch with the 24 hour nurse line if the clinic is closed.    To contact your OB/GYN Surgery Scheduler please call [phone]. This is a direct number for your care team between 8 a.m. and 4 p.m. Monday through Friday.    [facility] is open for your convenience: [phone]  Monday through Friday 8 a.m. to 8:30 p.m.  Saturday 9 a.m. to 6 p.m.  Sunday Noon to 6 p.m.    They are closed on all major holidays.

## 2020-10-16 ENCOUNTER — NURSE TRIAGE (OUTPATIENT)
Dept: NURSING | Facility: CLINIC | Age: 45
End: 2020-10-16

## 2020-10-16 NOTE — TELEPHONE ENCOUNTER
"Her stomach is burning , she has a pessary in.  She is having pain in her abdomen right above her belly button , it feels different than her normal heartburn, it hurts when she touches her stomach.  Care advice is to go to urgent care within 4 hours to be seen. She wants to try an antacid and if it is still there tomorrow she will go to urgent care to be seen. She denies any chest pain.     Merced Varela RN/ [facility]        Additional Information    Negative: Sounds like a life-threatening emergency to the triager    Negative: Patient sounds very sick or weak to the triager    Negative: [1] SEVERE pain AND [2] not improved 2 hours after pain medicine    Negative: [1] Genital area looks infected (e.g., draining sore, spreading redness) AND [2] fever    Negative: [1] Something is hanging out of the vagina AND [2] can't easily be pushed back inside    Negative: MODERATE-SEVERE itching (i.e., interferes with school, work, or sleep)    Negative: Genital area looks infected (e.g., draining sore, spreading redness)    Negative: Rash with painful tiny water blisters    Negative: [1] Rash (e.g., redness, tiny bumps, sore) of genital area AND [2] present > 24 hours    Negative: Tender lump (swelling or \"ball\") at vaginal opening    Negative: [1] Symptoms of a yeast infection (i.e., itchy, white discharge, not bad smelling) AND    [2] not improved > 3 days following CARE ADVICE    Negative: [1] Vaginal itching AND [2] not improved > 3 days following CARE ADVICE    Negative: Patient is worried about sexually transmitted disease (STD)    Negative: Feels like something inside is falling out of vagina (e.g., pressure, heaviness, fullness)    Negative: [1] Vaginal dryness or itching AND [2] nearing menopause or after menopause    Negative: Pain with sexual intercourse (dyspareunia)  (Exception: feels like prior yeast infection, minor abrasion, minor rash < 24 hour duration, mild itching)    Negative: Pain in " genital area is a chronic symptom (recurrent or ongoing AND present > 4 weeks)    Negative: All other vaginal symptoms  (Exception: feels like prior yeast infection, minor abrasion, mild rash < 24 hour duration, mild itching)    Negative: [1] Symptoms of a yeast infection (i.e., itchy, white discharge, not bad smelling) AND    [2] feels like prior vaginal yeast infections    Negative: [1] Rash (e.g., redness, tiny bumps, sore) of genital area AND [2] present < 24 hours    Negative: Mild vaginal itching    Negative: Vaginal dryness during intercourse    Negative: Severe difficulty breathing (e.g., struggling for each breath, speaks in single words)    Negative: Shock suspected (e.g., cold/pale/clammy skin, too weak to stand, low BP, rapid pulse)    Negative: Difficult to awaken or acting confused (e.g., disoriented, slurred speech)    Negative: Passed out (i.e., lost consciousness, collapsed and was not responding)    Negative: Visible sweat on face or sweat dripping down face    Negative: Sounds like a life-threatening emergency to the triager    Negative: Followed an abdomen (stomach) injury    Negative: Chest pain    [1] MILD-MODERATE pain AND [2] constant AND [3] present > 2 hours    Negative: Patient sounds very sick or weak to the triager    Negative: [1] Pregnant > 24 weeks AND [2] hand or face swelling    Protocols used: VAGINAL SYMPTOMS-A-AH, ABDOMINAL PAIN - UPPER-A-AH

## 2020-10-20 ENCOUNTER — TELEPHONE (OUTPATIENT)
Dept: OBGYN | Facility: CLINIC | Age: 45
End: 2020-10-20

## 2020-10-20 NOTE — TELEPHONE ENCOUNTER
I left a message for the pt to return my call to see how you are doing.  She was to be seen today for a F/U visit to her last OV  Douglas Sevilla MD

## 2020-10-22 ENCOUNTER — OFFICE VISIT (OUTPATIENT)
Dept: OBGYN | Facility: CLINIC | Age: 45
End: 2020-10-22
Payer: COMMERCIAL

## 2020-10-22 VITALS — DIASTOLIC BLOOD PRESSURE: 62 MMHG | BODY MASS INDEX: 27.21 KG/M2 | WEIGHT: 137 LBS | SYSTOLIC BLOOD PRESSURE: 104 MMHG

## 2020-10-22 DIAGNOSIS — N81.4 CYSTOCELE WITH SMALL RECTOCELE AND UTERINE DESCENT: Primary | ICD-10-CM

## 2020-10-22 PROCEDURE — A4562 PESSARY, NON RUBBER,ANY TYPE: HCPCS | Performed by: OBSTETRICS & GYNECOLOGY

## 2020-10-22 PROCEDURE — 99213 OFFICE O/P EST LOW 20 MIN: CPT | Performed by: OBSTETRICS & GYNECOLOGY

## 2020-10-22 NOTE — PATIENT INSTRUCTIONS
You can reach your [place] Care Team any time of the day by calling [phone]. This number will put you in touch with the 24 hour nurse line if the clinic is closed.    To contact your OB/GYN Station Coordinator/Surgery Scheduler please call [phone]. This is a direct number for your care team between 8 a.m. and 4 p.m. Monday through Friday.    [facility] is open for your convenience:  Monday through Friday 8 a.m. to 6 p.m.  Closed weekends and all major holidays.

## 2020-10-22 NOTE — NURSING NOTE
"Chief Complaint   Patient presents with     Pessary Check/Fit/Insert       Initial /62   Wt 62.1 kg (137 lb)   LMP 10/20/2020   BMI 27.21 kg/m   Estimated body mass index is 27.21 kg/m  as calculated from the following:    Height as of 2/3/20: 1.511 m (4' 11.5\").    Weight as of this encounter: 62.1 kg (137 lb).  BP completed using cuff size: regular    Questioned patient about current smoking habits.  Pt. has never smoked.          The following HM Due: NONE      The following patient reported/Care Every where data was sent to:  P ABSTRACT QUALITY INITIATIVES [[id number]]        Becki Mays [facility]                 "

## 2020-10-22 NOTE — PROGRESS NOTES
Katie Wilson is a 45 year old female  condoms for contraception not on HRT who presents today for a follow-up of the office visit of 10/8/2020.  To see the patient by Dr. Staley for evaluation of uterine prolapse and mixed urinary incontinence which have become more prominent since the birth of her last child.  At the time of her urodynamic study on 10/8/2020 there was evidence of pelvic floor relaxation with a symptomatic uterine prolapse, cystocele and hypermobility of the urethrovesical angle with subsequent urinary stress incontinence with an overactive bladder, urgency frequency and urge incontinence.  I again today reviewed the findings with the patient and discussed with her the risks benefits and alternative forms of therapy.  We reviewed the pathophysiology of the disease process and at this point the patient would like a trial of a pessary.  On 10/15/2020 the patient was fitted with a number for diaphragm type pessary.  She was instructed on how to place it in the morning and remove it at night.  The patient states that overall the pessary is working very well for her but she does have some discomfort in the introital area when removing it as it is a little bit big.  She has acceptable bowel bladder function.  We had a lengthy discussion today regarding her pelvic relaxation symptoms and alternative forms of therapy.  At this point she like to continue with the pessary.    Past Medical History:   Diagnosis Date     Abnormal Pap smear      Herpes simplex without mention of complication      Left breast lump      Lump of right breast      Thyroid disorder     as a child unknown if hyper or hypo      ROS: 10 point ROS neg other than the symptoms noted above in the HPI.  Current Outpatient Medications   Medication     PRENAT VIT-FEPOLY-METHYLFOL-FA PO     benzonatate (TESSALON) 100 MG capsule     escitalopram (LEXAPRO) 10 MG tablet     hydrOXYzine (ATARAX) 25 MG tablet     No current  facility-administered medications for this visit.      /62   Wt 62.1 kg (137 lb)   LMP 10/20/2020   BMI 27.21 kg/m    Constitutional: healthy, alert and no distress  Genitourinary: Normal external genitalia without lesions and Speculum exam grade 1 midline cystocele with grade 2 uterine descent to the antritis.  Multiparous appearing cervix no lesions seen, bimanual exam the uterus is parous mobile smooth firm adnexa without masses enlargement or tenderness, rectovaginal exam normal sphincter tone minimal grade 1 distal rectocele no evidence of any ulceration or irritation from the pessary.  Patient is menstruating at present.  I did refit her with a #3 diaphragm type pessary and this seems to be less uncomfortable with insertion and removal.  The pessary was dressed with Trimo-Zamora gel    (N81.4) Cystocele with small rectocele and uterine descent  (primary encounter diagnosis)  Comment: She will continue with insertion of the murmur morning and remove at night.  She will call for any concerns in the interval.  She does have a follow-up office appointment scheduled for November 17.  If she has difficulty with the pessary spontaneously popping out she will go back to the number for pessary.  A detailed plan was reviewed with her today  Plan: PESSARY, NON RUBBER,ANY TYPE        As above

## 2020-11-17 ENCOUNTER — OFFICE VISIT (OUTPATIENT)
Dept: OBGYN | Facility: CLINIC | Age: 45
End: 2020-11-17
Payer: COMMERCIAL

## 2020-11-17 VITALS — WEIGHT: 137 LBS | SYSTOLIC BLOOD PRESSURE: 110 MMHG | DIASTOLIC BLOOD PRESSURE: 72 MMHG | BODY MASS INDEX: 27.21 KG/M2

## 2020-11-17 DIAGNOSIS — N81.4 CYSTOCELE WITH SMALL RECTOCELE AND UTERINE DESCENT: Primary | ICD-10-CM

## 2020-11-17 PROCEDURE — 99213 OFFICE O/P EST LOW 20 MIN: CPT | Performed by: OBSTETRICS & GYNECOLOGY

## 2020-11-17 NOTE — PATIENT INSTRUCTIONS
You can reach your [place] Care Team any time of the day by calling [phone]. This number will put you in touch with the 24 hour nurse line if the clinic is closed.    To contact your OB/GYN Station Coordinator/Surgery Scheduler please call [phone]. This is a direct number for your care team between 8 a.m. and 4 p.m. Monday through Friday.    [facility] is open for your convenience:  Monday through Friday 8 a.m. to 6 p.m.  Closed weekends and all major holidays.

## 2020-11-17 NOTE — NURSING NOTE
"Chief Complaint   Patient presents with     Pessary Check/Fit/Insert       Initial /72   Wt 62.1 kg (137 lb)   LMP 2020   BMI 27.21 kg/m   Estimated body mass index is 27.21 kg/m  as calculated from the following:    Height as of 2/3/20: 1.511 m (4' 11.5\").    Weight as of this encounter: 62.1 kg (137 lb).  BP completed using cuff size: regular    Questioned patient about current smoking habits.  Pt. has never smoked.          The following HM Due: NONE      The following patient reported/Care Every where data was sent to:  P ABSTRACT QUALITY INITIATIVES [[id number]]        Becki Mays [facility]                 "

## 2020-11-19 NOTE — PROGRESS NOTES
Katie Wilson is a 45 year old female  condoms for contraception not on HRT who presents today for a follow-up of the office visit of 10/8/2020 and 10/22/20.  I was asked To see the patient by Dr. Staley for evaluation of uterine prolapse and mixed urinary incontinence which have become more prominent since the birth of her last child.  At the time of her urodynamic study on 10/8/2020 there was evidence of pelvic floor relaxation with a symptomatic uterine prolapse, cystocele and hypermobility of the urethrovesical angle with subsequent urinary stress incontinence with an overactive bladder, urgency frequency and urge incontinence.  I again today reviewed the findings with the patient and discussed with her the risks benefits and alternative forms of therapy.  We reviewed the pathophysiology of the disease process and at this point the patient would like a trial of a pessary.  On 10/15/2020 the patient was fitted with a number 4  diaphragm type pessary.  She was instructed on how to place it in the morning and remove it at night.  The patient was having some discomfort with removal and the subsequent visit of 10/22/2020 was refitted with a #3 diaphragm pessary.  She presents today for follow-up.  The patient states that while the #3 is easier to insert and remove when she is having a bowel movement she notices the pessary at the introitus.  It is never actually popped out.  We discussed with her methodologies that she could use to aid in insertion and removal.  The patient states that depending on the situation she will alternate between the #3 and the number 4 pessary    Past Medical History:   Diagnosis Date     Abnormal Pap smear      Herpes simplex without mention of complication      Left breast lump      Lump of right breast      Thyroid disorder     as a child unknown if hyper or hypo     Current Outpatient Medications   Medication     PRENAT VIT-FEPOLY-METHYLFOL-FA PO     benzonatate (TESSALON) 100  MG capsule     escitalopram (LEXAPRO) 10 MG tablet     hydrOXYzine (ATARAX) 25 MG tablet     No current facility-administered medications for this visit.      Past Surgical History:   Procedure Laterality Date      SECTION N/A 2015    Procedure:  SECTION;  Surgeon: Yohana Monroy MD;  Location: [facility] L+[facility]      SECTION N/A 1/3/2017    Procedure:  SECTION;  Surgeon: Jerzy Camacho MD;  Location: [facility]      ROS: 10 point ROS neg other than the symptoms noted above in the HPI.  /72   Wt 62.1 kg (137 lb)   LMP 2020   BMI 27.21 kg/m    Constitutional: healthy, alert and no distress  Genitourinary: Normal external genitalia without lesions and Speculum exam grade 1 midline cystocele with grade 2 uterine descent to the introitus.  Multiparous appearing cervix no lesions seen, bimanual exam the uterus is parous mobile smooth firm adnexa without masses enlargement or tenderness, rectovaginal exam normal sphincter tone minimal grade 1 distal rectocele no evidence of any ulceration or irritation from the pessary.  #3 diaphragm type pessary was redressed with Trimo-Zamora gel and replaced with acceptable pelvic floor support    (N81.4) Cystocele with small rectocele and uterine descent  (primary encounter diagnosis)  Comment: Acceptable pelvic floor support with the use of the pessary and the patient declined surgical intervention at this time.  The patient will alternate between the #3 and the number 4  pessary  Plan: We will see the patient back in 2 months or as needed concerns.  She will continue with the insertion of the morning and removal at night as per previous plan

## 2020-12-20 ENCOUNTER — HEALTH MAINTENANCE LETTER (OUTPATIENT)
Age: 45
End: 2020-12-20

## 2021-01-18 ENCOUNTER — OFFICE VISIT (OUTPATIENT)
Dept: OBGYN | Facility: CLINIC | Age: 46
End: 2021-01-18
Payer: COMMERCIAL

## 2021-01-18 VITALS — DIASTOLIC BLOOD PRESSURE: 76 MMHG | BODY MASS INDEX: 26.81 KG/M2 | SYSTOLIC BLOOD PRESSURE: 108 MMHG | WEIGHT: 135 LBS

## 2021-01-18 DIAGNOSIS — N81.11 CYSTOCELE, MIDLINE: Primary | ICD-10-CM

## 2021-01-18 PROCEDURE — 99213 OFFICE O/P EST LOW 20 MIN: CPT | Performed by: OBSTETRICS & GYNECOLOGY

## 2021-01-18 NOTE — NURSING NOTE
"Chief Complaint   Patient presents with     Pessary Check/Fit/Insert       Initial /76   Wt 61.2 kg (135 lb)   LMP 2021   BMI 26.81 kg/m   Estimated body mass index is 26.81 kg/m  as calculated from the following:    Height as of 2/3/20: 1.511 m (4' 11.5\").    Weight as of this encounter: 61.2 kg (135 lb).  BP completed using cuff size: regular    Questioned patient about current smoking habits.  Pt. has never smoked.          The following HM Due: NONE      The following patient reported/Care Every where data was sent to:  P ABSTRACT QUALITY INITIATIVES [[id number]]        Becki Mays [facility]                 "

## 2021-01-18 NOTE — PATIENT INSTRUCTIONS
You can reach your [place] Care Team any time of the day by calling [phone]. This number will put you in touch with the 24 hour nurse line if the clinic is closed.    To contact your OB/GYN Station Coordinator/Surgery Scheduler please call [phone]. This is a direct number for your care team between 8 a.m. and 4 p.m. Monday through Friday.    [facility] is open for your convenience:  Monday through Friday 8 a.m. to 6 p.m.  Closed weekends and all major holidays.

## 2021-01-21 NOTE — PROGRESS NOTES
Katie Wilson is a 45 year old female  condoms for contraception not on HRT who presents today for a follow-up of the office visit of 10/8/2020 and 10/22/20 and 2020.  I was asked To see the patient by Dr. Staley for evaluation of uterine prolapse and mixed urinary incontinence which have become more prominent since the birth of her last child.  At the time of her urodynamic study on 10/8/2020 there was evidence of pelvic floor relaxation with a symptomatic uterine prolapse, cystocele and hypermobility of the urethrovesical angle with subsequent urinary stress incontinence with an overactive bladder, urgency frequency and urge incontinence.  I again today reviewed the findings with the patient and discussed with her the risks benefits and alternative forms of therapy.  We reviewed the pathophysiology of the disease process and at this point the patient the patient is using a number for diaphragm pessary with good results and desires to continue with this form of therapy.  Earlier the patient had tried a #3 pessary but has since gone back to the number for diaphragm pessary as it is providing better relief of her symptoms.  She inserts it in the morning and removes it at night.  The patient has acceptable relief of her pelvic floor relaxation and urinary incontinence symptoms with the pessary in place.  She desires to continue with this conservative management plan    Past Medical History:   Diagnosis Date     Abnormal Pap smear      Herpes simplex without mention of complication      Left breast lump      Lump of right breast      Thyroid disorder     as a child unknown if hyper or hypo     Current Outpatient Medications   Medication     Oxyquinoline-Sod Lauryl Sulf 0.025-0.01 % GEL     benzonatate (TESSALON) 100 MG capsule     escitalopram (LEXAPRO) 10 MG tablet     hydrOXYzine (ATARAX) 25 MG tablet     No current facility-administered medications for this visit.      /76   Wt 61.2 kg (135 lb)    LMP 01/05/2021   BMI 26.81 kg/m    Constitutional: healthy, alert and no distress  Genitourinary: Normal external genitalia without lesions and Speculum exam grade 1 midline cystocele with grade 2 uterine descent to the introitus.  Multiparous appearing cervix no lesions seen, bimanual exam the uterus is parous mobile smooth firm adnexa without masses enlargement or tenderness, rectovaginal exam normal sphincter tone minimal grade 1 distal rectocele no evidence of any ulceration or irritation from the pessary.    After cleaning of the pessary with soap and water it was redressed with Trimo-Zamora gel and the #4 diaphragm type pessary was  replaced with acceptable pelvic floor support    (N81.11) Cystocele, midline  (primary encounter diagnosis)  Comment: Symptoms well controlled with the pessary  Plan: Oxyquinoline-Sod Lauryl Sulf 0.025-0.01 % GEL        She will continue with daily insertion and removal.  I like to see her back in 2 to 3 months for follow-up or as needed concern.  Will defer any surgical intervention if this conservative measures providing adequate relief

## 2021-04-19 ENCOUNTER — OFFICE VISIT (OUTPATIENT)
Dept: OBGYN | Facility: CLINIC | Age: 46
End: 2021-04-19
Payer: COMMERCIAL

## 2021-04-19 VITALS — DIASTOLIC BLOOD PRESSURE: 66 MMHG | BODY MASS INDEX: 26.81 KG/M2 | WEIGHT: 135 LBS | SYSTOLIC BLOOD PRESSURE: 100 MMHG

## 2021-04-19 DIAGNOSIS — N81.4 CYSTOCELE WITH SMALL RECTOCELE AND UTERINE DESCENT: Primary | ICD-10-CM

## 2021-04-19 PROCEDURE — 99213 OFFICE O/P EST LOW 20 MIN: CPT | Performed by: OBSTETRICS & GYNECOLOGY

## 2021-04-19 RX ORDER — MULTIPLE VITAMINS W/ MINERALS TAB 9MG-400MCG
1 TAB ORAL DAILY
COMMUNITY

## 2021-04-19 NOTE — PATIENT INSTRUCTIONS
You can reach your [place] Care Team any time of the day by calling [phone]. This number will put you in touch with the 24 hour nurse line if the clinic is closed.    To contact your OB/GYN Station Coordinator/Surgery Scheduler please call [phone]. This is a direct number for your care team between 8 a.m. and 4 p.m. Monday through Friday.    [facility] is open for your convenience:  Monday through Friday 8 a.m. to 6 p.m.  Closed weekends and all major holidays.

## 2021-04-19 NOTE — NURSING NOTE
"Chief Complaint   Patient presents with     Pessary Check/Fit/Insert       Initial /66   Wt 61.2 kg (135 lb)   LMP 2021   BMI 26.81 kg/m   Estimated body mass index is 26.81 kg/m  as calculated from the following:    Height as of 2/3/20: 1.511 m (4' 11.5\").    Weight as of this encounter: 61.2 kg (135 lb).  BP completed using cuff size: regular    Questioned patient about current smoking habits.  Pt. has never smoked.          The following HM Due: NONE      The following patient reported/Care Every where data was sent to:  P ABSTRACT QUALITY INITIATIVES [[id number]]        Becki Mays [facility]                 "

## 2021-04-19 NOTE — PROGRESS NOTES
Katie Wilson is a 45 year old female  condoms for contraception not on HRT who presents today for a follow-up of the office visit of 10/8/2020 and 10/22/20 , 2020 and 21.  I was asked To see the patient by Dr. Staley for evaluation of uterine prolapse and mixed urinary incontinence which have become more prominent since the birth of her last child.  At the time of her urodynamic study on 10/8/2020 there was evidence of pelvic floor relaxation with a symptomatic uterine prolapse, cystocele and hypermobility of the urethrovesical angle with subsequent urinary stress incontinence with an overactive bladder, urgency frequency and urge incontinence.  I again today reviewed the findings with the patient and discussed with her the risks benefits and alternative forms of therapy.  We reviewed the pathophysiology of the disease process and at this point the patient the patient is using a number for diaphragm pessary with good results and desires to continue with this form of therapy.  Earlier the patient had tried a #3 pessary but has since gone back to the number 4 diaphragm pessary as it is providing better relief of her symptoms.  She inserts it in the morning and removes it at night.  The patient has acceptable relief of her pelvic floor relaxation and urinary incontinence symptoms with the pessary in place.  She desires to continue with this conservative management plan    Past Medical History:   Diagnosis Date     Abnormal Pap smear      Herpes simplex without mention of complication      Left breast lump      Lump of right breast      Thyroid disorder     as a child unknown if hyper or hypo     Current Outpatient Medications   Medication     multivitamin w/minerals (MULTI-VITAMIN) tablet     Oxyquinoline-Sod Lauryl Sulf 0.025-0.01 % GEL     benzonatate (TESSALON) 100 MG capsule     escitalopram (LEXAPRO) 10 MG tablet     hydrOXYzine (ATARAX) 25 MG tablet     No current facility-administered  medications for this visit.      /66   Wt 61.2 kg (135 lb)   LMP 04/14/2021   BMI 26.81 kg/m    Constitutional: healthy, alert and no distress  Genitourinary: Normal external genitalia without lesions and Speculum exam grade 1 midline cystocele with grade 2 uterine descent to the introitus.  Multiparous appearing cervix no lesions seen, bimanual exam the uterus is parous mobile smooth firm adnexa without masses enlargement or tenderness, rectovaginal exam normal sphincter tone minimal grade 1 distal rectocele no evidence of any ulceration or irritation from the pessary.    After cleaning of the pessary with soap and water it was redressed with Trimo-Zamora gel and the #4 diaphragm type pessary was  replaced with acceptable pelvic floor support    (N81.4) Cystocele with small rectocele and uterine descent  (primary encounter diagnosis)  Comment: Symptoms of pelvic floor relaxation and incontinence are under good control with this methodology.  The patient desires to continue with this and is declining any surgical intervention  Plan: She will continue to insert it in the morning and remove it at night.  She does not wear it during her menses.  Symptoms of concern were discussed the patient will call.  If she continues to do well I will see her in September 2021 for follow-up or as needed sooner should concerns arise

## 2021-06-23 ENCOUNTER — OFFICE VISIT (OUTPATIENT)
Dept: INTERNAL MEDICINE | Facility: CLINIC | Age: 46
End: 2021-06-23
Payer: COMMERCIAL

## 2021-06-23 VITALS
WEIGHT: 134.4 LBS | TEMPERATURE: 98.1 F | OXYGEN SATURATION: 98 % | RESPIRATION RATE: 17 BRPM | BODY MASS INDEX: 26.39 KG/M2 | SYSTOLIC BLOOD PRESSURE: 108 MMHG | HEART RATE: 89 BPM | DIASTOLIC BLOOD PRESSURE: 75 MMHG | HEIGHT: 60 IN

## 2021-06-23 DIAGNOSIS — R09.82 PND (POST-NASAL DRIP): ICD-10-CM

## 2021-06-23 DIAGNOSIS — Z00.00 ROUTINE HISTORY AND PHYSICAL EXAMINATION OF ADULT: Primary | ICD-10-CM

## 2021-06-23 LAB — HGB BLD-MCNC: 12.3 G/DL (ref 11.7–15.7)

## 2021-06-23 PROCEDURE — 80061 LIPID PANEL: CPT | Performed by: INTERNAL MEDICINE

## 2021-06-23 PROCEDURE — 86003 ALLG SPEC IGE CRUDE XTRC EA: CPT | Performed by: INTERNAL MEDICINE

## 2021-06-23 PROCEDURE — 99396 PREV VISIT EST AGE 40-64: CPT | Performed by: INTERNAL MEDICINE

## 2021-06-23 PROCEDURE — 82785 ASSAY OF IGE: CPT | Performed by: INTERNAL MEDICINE

## 2021-06-23 PROCEDURE — 85018 HEMOGLOBIN: CPT | Performed by: INTERNAL MEDICINE

## 2021-06-23 PROCEDURE — 84443 ASSAY THYROID STIM HORMONE: CPT | Performed by: INTERNAL MEDICINE

## 2021-06-23 PROCEDURE — 80053 COMPREHEN METABOLIC PANEL: CPT | Performed by: INTERNAL MEDICINE

## 2021-06-23 PROCEDURE — 36415 COLL VENOUS BLD VENIPUNCTURE: CPT | Performed by: INTERNAL MEDICINE

## 2021-06-23 ASSESSMENT — ENCOUNTER SYMPTOMS
PSYCHIATRIC NEGATIVE: 1
GASTROINTESTINAL NEGATIVE: 1
SORE THROAT: 1
ENDOCRINE NEGATIVE: 1
CARDIOVASCULAR NEGATIVE: 1
MUSCULOSKELETAL NEGATIVE: 1
COUGH: 1
CONSTITUTIONAL NEGATIVE: 1
NEUROLOGICAL NEGATIVE: 1
BREAST MASS: 0

## 2021-06-23 ASSESSMENT — MIFFLIN-ST. JEOR: SCORE: 1173.19

## 2021-06-23 ASSESSMENT — PATIENT HEALTH QUESTIONNAIRE - PHQ9: SUM OF ALL RESPONSES TO PHQ QUESTIONS 1-9: 1

## 2021-06-23 NOTE — NURSING NOTE
"/75   Pulse 89   Temp 98.1  F (36.7  C) (Oral)   Resp 17   Ht 1.511 m (4' 11.5\")   Wt 61 kg (134 lb 6.4 oz)   LMP 06/09/2021   SpO2 98%   BMI 26.69 kg/m    Patient in for Female Px.  [place], MIRZA    "

## 2021-06-23 NOTE — PROGRESS NOTES
SUBJECTIVE:   CC: Katie Wilson is an 45 year old woman who presents for preventive health visit.       Patient has been advised of split billing requirements and indicates understanding: Yes  Healthy Habits:     Getting at least 3 servings of Calcium per day:  Yes    Bi-annual eye exam:  Yes    Dental care twice a year:  Yes    Sleep apnea or symptoms of sleep apnea:  None    Diet:  Regular (no restrictions)    Frequency of exercise:  None    Taking medications regularly:  Yes    Medication side effects:  None    PHQ-2 Total Score: 0    Additional concerns today:  No      Depression Followup    How are you doing with your depression since your last visit? Improved , not on meds     Are you having other symptoms that might be associated with depression? No    Have you had a significant life event?  No     Are you feeling anxious or having panic attacks?   No    Do you have any concerns with your use of alcohol or other drugs? No    PHQ 11/6/2019 12/19/2019 6/23/2021   PHQ-9 Total Score - 13 1   Q9: Thoughts of better off dead/self-harm past 2 weeks Not at all Not at all Not at all        H/o Thyroid disorder- not on any meds,     Since last visit, patient describes the following symptoms: Weight stable, no hair loss, no skin changes, no constipation, no loose stools      Today's PHQ-2 Score:   PHQ-2 ( 1999 Pfizer) 6/23/2021   Q1: Little interest or pleasure in doing things 0   Q2: Feeling down, depressed or hopeless 0   PHQ-2 Score 0   Q1: Little interest or pleasure in doing things Not at all   Q2: Feeling down, depressed or hopeless Not at all   PHQ-2 Score 0       Abuse: Current or Past (Physical, Sexual or Emotional) - No  Do you feel safe in your environment? Yes    Have you ever done Advance Care Planning? (For example, a Health Directive, POLST, or a discussion with a medical provider or your loved ones about your wishes): No, advance care planning information given to patient to review.  Patient plans  to discuss their wishes with loved ones or provider.        Past Medical History:   Diagnosis Date     Abnormal Pap smear      Herpes simplex without mention of complication      Left breast lump      Lump of right breast      Thyroid disorder     as a child unknown if hyper or hypo       Past Surgical History:   Procedure Laterality Date      SECTION N/A 2015    Procedure:  SECTION;  Surgeon: Yohana Monroy MD;  Location: [facility]      SECTION N/A 1/3/2017    Procedure:  SECTION;  Surgeon: Jerzy Camacho MD;  Location: [facility]       Current Outpatient Medications   Medication Sig Dispense Refill     multivitamin w/minerals (MULTI-VITAMIN) tablet Take 1 tablet by mouth daily       Oxyquinoline-Sod Lauryl Sulf 0.025-0.01 % GEL Place 1 applicator vaginally daily With pessary change as directed 113.4 g 3       Family History   Problem Relation Age of Onset     Cerebrovascular Disease Father      Hypertension Mother      Cervical Cancer Mother        Social History     Tobacco Use     Smoking status: Never Smoker     Smokeless tobacco: Never Used   Substance Use Topics     Alcohol use: No     Alcohol/week: 0.0 standard drinks       Alcohol Use 2021   Prescreen: >3 drinks/day or >7 drinks/week? No   Prescreen: >3 drinks/day or >7 drinks/week? -       Reviewed orders with patient.  Reviewed health maintenance and updated orders accordingly - Yes        History of abnormal Pap smear: NO - age 30-65 PAP every 5 years with negative HPV co-testing recommended  PAP / HPV Latest Ref Rng & Units 2017   PAP - NIL NIL   HPV 16 DNA NEG:Negative Negative -   HPV 18 DNA NEG:Negative Negative -   OTHER HR HPV NEG:Negative Negative -     Reviewed and updated as needed this visit by clinical staff  Tobacco     Med Hx  Surg Hx  Fam Hx  Soc Hx        Reviewed and updated as needed this visit by Provider                    Review of Systems   Constitutional: Negative.   "  HENT: Positive for postnasal drip.    Respiratory: Positive for cough.    Cardiovascular: Negative.    Gastrointestinal: Negative.    Endocrine: Negative.    Breasts:  Negative for tenderness, breast mass and discharge.   Genitourinary: Negative for pelvic pain, vaginal bleeding and vaginal discharge.   Musculoskeletal: Negative.    Neurological: Negative.    Psychiatric/Behavioral: Negative.          OBJECTIVE:   /75   Pulse 89   Temp 98.1  F (36.7  C) (Oral)   Resp 17   Ht 1.511 m (4' 11.5\")   Wt 61 kg (134 lb 6.4 oz)   LMP 06/09/2021   SpO2 98%   BMI 26.69 kg/m    Physical Exam  GENERAL: healthy, alert and no distress  EYES: Eyes grossly normal to inspection, PERRL and conjunctivae and sclerae normal  HENT:  mouth without ulcers or lesions  NECK: no adenopathy, no asymmetry, masses, or scars and thyroid normal to palpation  RESP: lungs clear to auscultation - no rales, rhonchi or wheezes  BREAST: normal without masses, tenderness or nipple discharge and no palpable axillary masses or adenopathy  CV: regular rate and rhythm, normal S1 S2, no S3 or S4, no murmur, click or rub, no peripheral edema and peripheral pulses strong  ABDOMEN: soft, nontender, no hepatosplenomegaly, no masses and bowel sounds normal  MS: no gross musculoskeletal defects noted, no edema  NEURO: Normal strength and tone, mentation intact and speech normal  PSYCH: mentation appears normal, affect normal/bright    ASSESSMENT/PLAN:       (Z00.00) Routine history and physical examination of adult  (primary encounter diagnosis)  Plan: Hemoglobin, Comprehensive metabolic panel,         Lipid panel reflex to direct LDL Fasting, TSH         with free T4 reflex            (R09.82) PND (post-nasal drip)  Comment: Probably secondary to allergies,   Plan: Patient is not sure if she has allergies and requesting allergy testing, [place] Resp Allergen Panel.pt was told I will contact her after results and proceed accordingly.       Patient " "has been advised of split billing requirements and indicates understanding: Yes  COUNSELING:  Reviewed preventive health counseling, as reflected in patient instructions       Regular exercise       Healthy diet/nutrition    Estimated body mass index is 26.69 kg/m  as calculated from the following:    Height as of this encounter: 1.511 m (4' 11.5\").    Weight as of this encounter: 61 kg (134 lb 6.4 oz).    Weight management plan: Discussed healthy diet and exercise guidelines    She reports that she has never smoked. She has never used smokeless tobacco.      Counseling Resources:  ATP IV Guidelines  Pooled Cohorts Equation Calculator  Breast Cancer Risk Calculator  BRCA-Related Cancer Risk Assessment: FHS-7 Tool  FRAX Risk Assessment  ICSI Preventive Guidelines  Dietary Guidelines for Americans, 2010  USDA's MyPlate  ASA Prophylaxis  Lung CA Screening    Carmen Atkins MD  [facility]  "

## 2021-06-24 LAB
A ALTERNATA IGE QN: <0.1 KU(A)/L
A FUMIGATUS IGE QN: <0.1 KU(A)/L
ALBUMIN SERPL-MCNC: 3.8 G/DL (ref 3.4–5)
ALP SERPL-CCNC: 75 U/L (ref 40–150)
ALT SERPL W P-5'-P-CCNC: 21 U/L (ref 0–50)
ANION GAP SERPL CALCULATED.3IONS-SCNC: 3 MMOL/L (ref 3–14)
AST SERPL W P-5'-P-CCNC: 16 U/L (ref 0–45)
BERMUDA GRASS IGE QN: <0.1 KU(A)/L
BILIRUB SERPL-MCNC: 0.4 MG/DL (ref 0.2–1.3)
BUN SERPL-MCNC: 9 MG/DL (ref 7–30)
C HERBARUM IGE QN: <0.1 KU(A)/L
CALCIUM SERPL-MCNC: 8.3 MG/DL (ref 8.5–10.1)
CAT DANDER IGG QN: <0.1 KU(A)/L
CEDAR IGE QN: <0.1 KU(A)/L
CHLORIDE SERPL-SCNC: 108 MMOL/L (ref 94–109)
CHOLEST SERPL-MCNC: 205 MG/DL
CO2 SERPL-SCNC: 26 MMOL/L (ref 20–32)
COMMON RAGWEED IGE QN: <0.1 KU(A)/L
COTTONWOOD IGE QN: <0.1 KU(A)/L
CREAT SERPL-MCNC: 0.67 MG/DL (ref 0.52–1.04)
D FARINAE IGE QN: <0.1 KU(A)/L
D PTERONYSS IGE QN: <0.1 KU(A)/L
DOG DANDER+EPITH IGE QN: <0.1 KU(A)/L
GFR SERPL CREATININE-BSD FRML MDRD: >90 ML/MIN/{1.73_M2}
GLUCOSE SERPL-MCNC: 81 MG/DL (ref 70–99)
HDLC SERPL-MCNC: 46 MG/DL
IGE SERPL-ACNC: 17 KIU/L (ref 0–114)
LDLC SERPL CALC-MCNC: 124 MG/DL
MAPLE IGE QN: <0.1 KU(A)/L
MARSH ELDER IGE QN: <0.1 KU(A)/L
MOUSE URINE PROT IGE QN: <0.1 KU(A)/L
NETTLE IGE QN: <0.1 KU(A)/L
NONHDLC SERPL-MCNC: 159 MG/DL
P NOTATUM IGE QN: <0.1 KU(A)/L
POTASSIUM SERPL-SCNC: 3.8 MMOL/L (ref 3.4–5.3)
PROT SERPL-MCNC: 8 G/DL (ref 6.8–8.8)
ROACH IGE QN: <0.1 KU(A)/L
SALTWORT IGE QN: <0.1 KU(A)/L
SILVER BIRCH IGE QN: <0.1 KU(A)/L
SODIUM SERPL-SCNC: 137 MMOL/L (ref 133–144)
TIMOTHY IGE QN: <0.1 KU(A)/L
TRIGL SERPL-MCNC: 176 MG/DL
TSH SERPL DL<=0.005 MIU/L-ACNC: 0.78 MU/L (ref 0.4–4)
WHITE ASH IGE QN: <0.1 KU(A)/L
WHITE ELM IGE QN: <0.1 KU(A)/L
WHITE MULBERRY IGE QN: <0.1 KU(A)/L
WHITE OAK IGE QN: <0.1 KU(A)/L

## 2021-08-08 ENCOUNTER — HEALTH MAINTENANCE LETTER (OUTPATIENT)
Age: 46
End: 2021-08-08

## 2021-09-14 ENCOUNTER — OFFICE VISIT (OUTPATIENT)
Dept: OBGYN | Facility: CLINIC | Age: 46
End: 2021-09-14
Payer: COMMERCIAL

## 2021-09-14 VITALS — WEIGHT: 136 LBS | DIASTOLIC BLOOD PRESSURE: 62 MMHG | SYSTOLIC BLOOD PRESSURE: 100 MMHG | BODY MASS INDEX: 27.01 KG/M2

## 2021-09-14 DIAGNOSIS — N81.4 CYSTOCELE WITH SMALL RECTOCELE AND UTERINE DESCENT: Primary | ICD-10-CM

## 2021-09-14 PROCEDURE — 99213 OFFICE O/P EST LOW 20 MIN: CPT | Performed by: OBSTETRICS & GYNECOLOGY

## 2021-09-14 NOTE — NURSING NOTE
"Chief Complaint   Patient presents with     Pessary Check/Fit/Insert       Initial /62   Wt 61.7 kg (136 lb)   LMP 09/10/2021   BMI 27.01 kg/m   Estimated body mass index is 27.01 kg/m  as calculated from the following:    Height as of 21: 1.511 m (4' 11.5\").    Weight as of this encounter: 61.7 kg (136 lb).  BP completed using cuff size: regular    Questioned patient about current smoking habits.  Pt. has never smoked.          The following HM Due: NONE      The following patient reported/Care Every where data was sent to:  P ABSTRACT QUALITY INITIATIVES [[id number]]        Becki Mays [facility]                 "

## 2021-09-14 NOTE — PROGRESS NOTES
Katie Wilson is a 46 year old female  condoms for contraception not on HRT who presents today for a follow-up of the office visit of 10/8/2020 and 10/22/20 , 2020, 21and 2021.  I was asked To see the patient by Dr. Staley for evaluation of uterine prolapse and mixed urinary incontinence which have become more prominent since the birth of her last child.  At the time of her urodynamic study on 10/8/2020 there was evidence of pelvic floor relaxation with a symptomatic uterine prolapse, cystocele and hypermobility of the urethrovesical angle with subsequent urinary stress incontinence with an overactive bladder, urgency frequency and urge incontinence.  I again today reviewed the findings with the patient and discussed with her the risks benefits and alternative forms of therapy.  We reviewed the pathophysiology of the disease process and at this point the patient the patient is using a number for diaphragm pessary with good results and desires to continue with this form of therapy.  Earlier the patient had tried a #3 pessary but has since gone back to the number 4 diaphragm pessary as it is providing better relief of her symptoms.  She inserts it in the morning and removes it at night.  The patient has acceptable relief of her pelvic floor relaxation and urinary incontinence symptoms with the pessary in place.  She does not wear the pessary during her menses.  She desires to continue with this conservative management plan.  I did discuss with her the options of surgical repair.  She has 2 children who are special needs at this time has no other option at home and recovery from surgery would be difficult.    Past Medical History:   Diagnosis Date     Abnormal Pap smear      Herpes simplex without mention of complication      Left breast lump      Lump of right breast      Thyroid disorder     as a child unknown if hyper or hypo     Current Outpatient Medications   Medication     multivitamin  w/minerals (MULTI-VITAMIN) tablet     Oxyquinoline-Sod Lauryl Sulf 0.025-0.01 % GEL     No current facility-administered medications for this visit.     Past Surgical History:   Procedure Laterality Date      SECTION N/A 2015    Procedure:  SECTION;  Surgeon: Yohana Monroy MD;  [facility]      SECTION N/A 1/3/2017    Procedure:  SECTION;  Surgeon: Jerzy Camacho MD;  Location: [facility]     /62   Wt 61.7 kg (136 lb)   LMP 09/10/2021   BMI 27.01 kg/m    Constitutional: healthy, alert and no distress  Genitourinary: Normal external genitalia without lesions and Speculum exam reveals a grade 1 midline cystocele with grade 2 uterine descent to the introitus.  Multiparous appearing cervix menstrual type flow was present.  There is a grade 1-2 distal rectocele with normal sphincter tone.  There is no evidence of ulceration.  The patient did not bring her pessary in today as she was menstruating    (N81.4) Cyst we will see her back in December for routine follow-up or RN concerns should they arise ocele with small rectocele and uterine descent  (primary encounter diagnosis)  Comment: The findings were again reviewed with the patient today and options of therapy discussed.  At this point she desires to continue with the pessary  Plan: as above

## 2021-09-14 NOTE — PATIENT INSTRUCTIONS
You can reach your [facility] Care Team any time of the day by calling [phone]. This number will put you in touch with the 24 hour nurse line if the clinic is closed.    To contact your OB/GYN Station Coordinator/Surgery Scheduler please call [phone]. This is a direct number for your care team between 8 a.m. and 4 p.m. Monday through Friday.    [facility] is open for your convenience:  Monday through Friday 8 a.m. to 6 p.m.  Closed weekends and all major holidays.

## 2021-10-03 ENCOUNTER — HEALTH MAINTENANCE LETTER (OUTPATIENT)
Age: 46
End: 2021-10-03

## 2021-10-22 ENCOUNTER — E-VISIT (OUTPATIENT)
Dept: URGENT CARE | Facility: CLINIC | Age: 46
End: 2021-10-22
Payer: COMMERCIAL

## 2021-10-22 DIAGNOSIS — Z20.822 SUSPECTED COVID-19 VIRUS INFECTION: ICD-10-CM

## 2021-10-22 DIAGNOSIS — J02.9 SORE THROAT: ICD-10-CM

## 2021-10-22 PROCEDURE — 99421 OL DIG E/M SVC 5-10 MIN: CPT | Performed by: NURSE PRACTITIONER

## 2021-10-23 ENCOUNTER — LAB (OUTPATIENT)
Dept: URGENT CARE | Facility: URGENT CARE | Age: 46
End: 2021-10-23
Attending: NURSE PRACTITIONER
Payer: COMMERCIAL

## 2021-10-23 DIAGNOSIS — Z20.822 SUSPECTED COVID-19 VIRUS INFECTION: ICD-10-CM

## 2021-10-23 DIAGNOSIS — J02.9 SORE THROAT: ICD-10-CM

## 2021-10-23 LAB
DEPRECATED S PYO AG THROAT QL EIA: NEGATIVE
GROUP A STREP BY PCR: NOT DETECTED

## 2021-10-23 PROCEDURE — 87651 STREP A DNA AMP PROBE: CPT

## 2021-10-23 PROCEDURE — U0003 INFECTIOUS AGENT DETECTION BY NUCLEIC ACID (DNA OR RNA); SEVERE ACUTE RESPIRATORY SYNDROME CORONAVIRUS 2 (SARS-COV-2) (CORONAVIRUS DISEASE [COVID-19]), AMPLIFIED PROBE TECHNIQUE, MAKING USE OF HIGH THROUGHPUT TECHNOLOGIES AS DESCRIBED BY CMS-2020-01-R: HCPCS

## 2021-10-23 PROCEDURE — U0005 INFEC AGEN DETEC AMPLI PROBE: HCPCS

## 2021-10-23 NOTE — PATIENT INSTRUCTIONS
Dear Katie Wilson,    Your symptoms show that you may have coronavirus (COVID-19). This illness can cause fever, cough and trouble breathing. Many people get a mild case and get better on their own. Some people can get very sick.    Because you also reported sore throat I would like to also test you for Strep Throat to determine if we need to treat you for that as well.    What should I do?  We would like to test you for Covid-19 virus and Strep Throat. I have placed orders for these tests.   To schedule: go to your [employer] home page and scroll down to the section that says  You have an appointment that needs to be scheduled  and click the large green button that says  Schedule Now  and follow the steps to find the next available openings. It is important that when you are asked what the reason for your appointment is that you mention you need BOTH Covid and Strep tests.    If you are unable to complete these [employer] scheduling steps, please call [phone] to schedule your testing.     Return to work/school/ guidance:   Please let your workplace manager and staffing office know when your quarantine ends     We can t give you an exact date as it depends on the above. You can calculate this on your own or work with your manager/staffing office to calculate this. (For example if you were exposed on 10/4, you would have to quarantine for 14 full days. That would be through 10/18. You could return on 10/19.)      If you receive a positive COVID-19 test result, follow the guidance of the those who are giving you the results. Usually the return to work is 10 (or in some cases 20 days from symptom onset.) If you work at [employer], you must also be cleared by Employee Occupational Health and Safety to return to work.        If you receive a negative COVID-19 test result and did not have a high risk exposure to someone with a known positive COVID-19 test, you can return to work once you're free of fever  for 24 hours without fever-reducing medication and your symptoms are improving or resolved.      If you receive a negative COVID-19 test and If you had a high risk exposure to someone who has tested positive for COVID-19 then you can return to work 14 days after your last contact with the positive individual    Note: If you have ongoing exposure to the covid positive person, this quarantine period may be more than 14 days. (For example, if you are continued to be exposed to your child who tested positive and cannot isolate from them, then the quarantine of 7-14 days can't start until your child is no longer contagious. This is typically 10 days from onset of the child's symptoms. So the total duration may be 17-24 days in this case.)    Sign up for NXTM.   We know it's scary to hear that you might have COVID-19. We want to track your symptoms to make sure you're okay over the next 2 weeks. Please look for an email from NXTM--this is a free, online program that we'll use to keep in touch. To sign up, follow the link in the email you will receive. Learn more at [email or web address]    How can I take care of myself?    Get lots of rest. Drink extra fluids (unless a doctor has told you not to)    Take Tylenol (acetaminophen) or ibuprofen for fever or pain. If you have liver or kidney problems, ask your family doctor if it's okay to take Tylenol o ibuprofen    If you have other health problems (like cancer, heart failure, an organ transplant or severe kidney disease): Call your specialty clinic if you don't feel better in the next 2 days.    Know when to call 911. Emergency warning signs include:  o Trouble breathing or shortness of breath  o Pain or pressure in the chest that doesn't go away  o Feeling confused like you haven't felt before, or not being able to wake up  o Bluish-colored lips or face    Where can I get more information?  [facility] - About COVID-19:    [email or web address]    CDC - What to Do If You're Sick:   www.cdc.gov/coronavirus/2019-ncov/about/steps-when-sick.html    October 22, 2021  RE:  Katie Wilson                                                                                                                  [address] [address]      To whom it may concern:    I evaluated Katie Wilson on October 22, 2021. Katie Wilson should be excused from work/school.     They should let their workplace manager and staffing office know when their quarantine ends.    We can not give an exact date as it depends on the information below. They can calculate this on their own or work with their manager/staffing office to calculate this. (For example if they were exposed on 10/04, they would have to quarantine for 14 full days. That would be through 10/18. They could return on 10/19.)    Quarantine Guidelines:      If patient receives a positive COVID-19 test result, they should follow the guidance of those who are giving the results. Usually the return to work is 10 (or in some cases 20 days from symptom onset.) If they work at [employer], they must be cleared by Employee Occupational Health and Safety to return to work.        If patient receives a negative COVID-19 test result and did not have a high risk exposure to someone with a known positive COVID-19 test, they can return to work once they're free of fever for 24 hours without fever-reducing medication and their symptoms are improving or resolved.      If patient receives a negative COVID-19 test and if they had a high risk exposure to someone who has tested positive for COVID-19 then they can return to work 14 days after their last contact with the positive individual    Note: If there is ongoing exposure to the covid positive person, this quarantine period may be longer than 14 days. (For example, if they are continually exposed to their child, who tested positive  and cannot isolate from them, then the quarantine of 7-14 days can't start until their child is no longer contagious. This is typically 10 days from onset to the child's symptoms. So the total duration may be 17-24 days in this case.)     Sincerely,  Dory Shin, CNP

## 2021-10-24 LAB — SARS-COV-2 RNA RESP QL NAA+PROBE: NEGATIVE

## 2021-11-20 ENCOUNTER — IMMUNIZATION (OUTPATIENT)
Dept: FAMILY MEDICINE | Facility: CLINIC | Age: 46
End: 2021-11-20
Payer: COMMERCIAL

## 2021-11-20 DIAGNOSIS — Z23 NEED FOR PROPHYLACTIC VACCINATION AND INOCULATION AGAINST INFLUENZA: Primary | ICD-10-CM

## 2021-11-20 PROCEDURE — 90686 IIV4 VACC NO PRSV 0.5 ML IM: CPT

## 2021-11-20 PROCEDURE — 99207 PR NO CHARGE NURSE ONLY: CPT

## 2021-11-20 PROCEDURE — 90471 IMMUNIZATION ADMIN: CPT

## 2021-12-07 ENCOUNTER — OFFICE VISIT (OUTPATIENT)
Dept: OBGYN | Facility: CLINIC | Age: 46
End: 2021-12-07
Payer: COMMERCIAL

## 2021-12-07 VITALS — BODY MASS INDEX: 28 KG/M2 | SYSTOLIC BLOOD PRESSURE: 98 MMHG | WEIGHT: 141 LBS | DIASTOLIC BLOOD PRESSURE: 72 MMHG

## 2021-12-07 DIAGNOSIS — N81.4 CYSTOCELE WITH SMALL RECTOCELE AND UTERINE DESCENT: Primary | ICD-10-CM

## 2021-12-07 PROCEDURE — 99213 OFFICE O/P EST LOW 20 MIN: CPT | Performed by: OBSTETRICS & GYNECOLOGY

## 2021-12-07 NOTE — PROGRESS NOTES
Katie Wilson is a 46 year old female  condoms for contraception not on HRT who presents today for a follow-up of the office visit of 10/8/2020 and 10/22/20 , 2020, 21, 2021 and 21.  I was asked To see the patient by Dr. Staley for evaluation of uterine prolapse and mixed urinary incontinence which have become more prominent since the birth of her last child.  At the time of her urodynamic study on 10/8/2020 there was evidence of pelvic floor relaxation with a symptomatic uterine prolapse, cystocele and hypermobility of the urethrovesical angle with subsequent urinary stress incontinence with an overactive bladder, urgency frequency and urge incontinence.  I again today reviewed the findings with the patient and discussed with her the risks benefits and alternative forms of therapy.  We reviewed the pathophysiology of the disease process and at this point the patient the patient is using a number 4 diaphragm pessary with good results and desires to continue with this form of therapy.  She has some occasional involuntary urine is full.  We did discuss minimization of bladder irritants in particular caffeine and carbonation with timed voidings to keep her bladder volumes small.  Because of her small children with special needs and the lack of childcare or other options at home during her postop recovery process a surgical repair is not an option at this time.    Past Medical History:   Diagnosis Date     Abnormal Pap smear      Herpes simplex without mention of complication      Left breast lump      Lump of right breast      Thyroid disorder     as a child unknown if hyper or hypo     Current Outpatient Medications   Medication     multivitamin w/minerals (MULTI-VITAMIN) tablet     Oxyquinoline-Sod Lauryl Sulf 0.025-0.01 % GEL     No current facility-administered medications for this visit.     BP 98/72   Wt 64 kg (141 lb)   LMP 2021   BMI 28.00 kg/m    Constitutional: healthy,  alert and no distress  Genitourinary: Normal external genitalia without lesions and Speculum exam reveals a grade 1 midline cystocele with grade 2 uterine descent to the introitus.  Multiparous appearing cervix menstrual type flow was present.  There is a grade 1-2 distal rectocele with normal sphincter tone.  There is no evidence of ulceration.  after removal the pessary was cleaned with soap and water, redressed with TrimoSan gel and replaced with good resulting support    (N81.4) Cystocele with small rectocele and uterine descent  (primary encounter diagnosis)  Comment: doing well with present plan  Pt to let us know if this does not continue to be acceptable at which time a surgical repair can be considred  Plan: RTC 3 months or prn concerns

## 2021-12-07 NOTE — NURSING NOTE
"Chief Complaint   Patient presents with     Pessary Check/Fit/Insert       Initial BP 98/72   Wt 64 kg (141 lb)   LMP 2021   BMI 28.00 kg/m   Estimated body mass index is 28 kg/m  as calculated from the following:    Height as of 21: 1.511 m (4' 11.5\").    Weight as of this encounter: 64 kg (141 lb).  BP completed using cuff size: regular    Questioned patient about current smoking habits.  Pt. has never smoked.          The following HM Due: NONE      The following patient reported/Care Every where data was sent to:  P ABSTRACT QUALITY INITIATIVES [[id number]]        Becki Mays, [facility]                 "

## 2021-12-07 NOTE — PATIENT INSTRUCTIONS
You can reach your [facility] Care Team any time of the day by calling [phone]. This number will put you in touch with the 24 hour nurse line if the clinic is closed.    To contact your OB/GYN Station Coordinator/Surgery Scheduler please call [phone]. This is a direct number for your care team between 8 a.m. and 4 p.m. Monday through Friday.    [facility] is open for your convenience:  Monday through Friday 8 a.m. to 6 p.m.  Closed weekends and all major holidays.

## 2022-02-11 ENCOUNTER — TELEPHONE (OUTPATIENT)
Dept: INTERNAL MEDICINE | Facility: CLINIC | Age: 47
End: 2022-02-11
Payer: COMMERCIAL

## 2022-02-11 NOTE — TELEPHONE ENCOUNTER
Reason for Call:  Other Insurance and Physical    Detailed comments: PT's spouse returned call and stated they can have 1 physical per calendar year and not 366 days.  PT's spouse, Jimmie, stated they pay by HSA and then reimbursed by insurance co, [facility]. Pt's spouse stated has been doing this for years.     Phone Number Patient can be reached at: Home number on file [phone] or [phone]    Best Time: Pt's souse works night and usually sleeps during the days. Early or late     Can we leave a detailed message on this number? YES    Call taken on 2/11/2022 at 10:14 AM by Mya Jones

## 2022-02-14 ASSESSMENT — ENCOUNTER SYMPTOMS: BREAST MASS: 0

## 2022-02-15 ENCOUNTER — OFFICE VISIT (OUTPATIENT)
Dept: INTERNAL MEDICINE | Facility: CLINIC | Age: 47
End: 2022-02-15
Payer: COMMERCIAL

## 2022-02-15 VITALS
BODY MASS INDEX: 26.21 KG/M2 | HEIGHT: 60 IN | WEIGHT: 133.5 LBS | OXYGEN SATURATION: 100 % | TEMPERATURE: 97.4 F | RESPIRATION RATE: 11 BRPM | DIASTOLIC BLOOD PRESSURE: 70 MMHG | HEART RATE: 88 BPM | SYSTOLIC BLOOD PRESSURE: 102 MMHG

## 2022-02-15 DIAGNOSIS — F41.1 GAD (GENERALIZED ANXIETY DISORDER): ICD-10-CM

## 2022-02-15 DIAGNOSIS — F33.1 MODERATE EPISODE OF RECURRENT MAJOR DEPRESSIVE DISORDER (H): ICD-10-CM

## 2022-02-15 DIAGNOSIS — Z00.00 ROUTINE HISTORY AND PHYSICAL EXAMINATION OF ADULT: Primary | ICD-10-CM

## 2022-02-15 DIAGNOSIS — Z23 HIGH PRIORITY FOR 2019-NCOV VACCINE: ICD-10-CM

## 2022-02-15 PROCEDURE — 99214 OFFICE O/P EST MOD 30 MIN: CPT | Mod: 25 | Performed by: INTERNAL MEDICINE

## 2022-02-15 PROCEDURE — 91305 COVID-19,PF,PFIZER (12+ YRS): CPT | Performed by: INTERNAL MEDICINE

## 2022-02-15 PROCEDURE — 0054A COVID-19,PF,PFIZER (12+ YRS): CPT | Performed by: INTERNAL MEDICINE

## 2022-02-15 PROCEDURE — 96127 BRIEF EMOTIONAL/BEHAV ASSMT: CPT | Mod: 59 | Performed by: INTERNAL MEDICINE

## 2022-02-15 PROCEDURE — 99396 PREV VISIT EST AGE 40-64: CPT | Mod: 25 | Performed by: INTERNAL MEDICINE

## 2022-02-15 ASSESSMENT — ENCOUNTER SYMPTOMS
GASTROINTESTINAL NEGATIVE: 1
MUSCULOSKELETAL NEGATIVE: 1
BREAST MASS: 0
CONSTITUTIONAL NEGATIVE: 1
NERVOUS/ANXIOUS: 1
RESPIRATORY NEGATIVE: 1
ENDOCRINE NEGATIVE: 1
EYES NEGATIVE: 1
CARDIOVASCULAR NEGATIVE: 1
NEUROLOGICAL NEGATIVE: 1

## 2022-02-15 ASSESSMENT — ANXIETY QUESTIONNAIRES
3. WORRYING TOO MUCH ABOUT DIFFERENT THINGS: SEVERAL DAYS
1. FEELING NERVOUS, ANXIOUS, OR ON EDGE: SEVERAL DAYS
2. NOT BEING ABLE TO STOP OR CONTROL WORRYING: NOT AT ALL
GAD7 TOTAL SCORE: 8
5. BEING SO RESTLESS THAT IT IS HARD TO SIT STILL: SEVERAL DAYS
7. FEELING AFRAID AS IF SOMETHING AWFUL MIGHT HAPPEN: SEVERAL DAYS
6. BECOMING EASILY ANNOYED OR IRRITABLE: NEARLY EVERY DAY
IF YOU CHECKED OFF ANY PROBLEMS ON THIS QUESTIONNAIRE, HOW DIFFICULT HAVE THESE PROBLEMS MADE IT FOR YOU TO DO YOUR WORK, TAKE CARE OF THINGS AT HOME, OR GET ALONG WITH OTHER PEOPLE: SOMEWHAT DIFFICULT

## 2022-02-15 ASSESSMENT — PATIENT HEALTH QUESTIONNAIRE - PHQ9
SUM OF ALL RESPONSES TO PHQ QUESTIONS 1-9: 2
5. POOR APPETITE OR OVEREATING: SEVERAL DAYS

## 2022-02-15 ASSESSMENT — MIFFLIN-ST. JEOR: SCORE: 1164.11

## 2022-02-15 NOTE — PROGRESS NOTES
SUBJECTIVE:   CC: Katie Wilson is an 46 year old woman who presents for preventive health visit.       Patient has been advised of split billing requirements and indicates understanding: Yes  Healthy Habits:     Getting at least 3 servings of Calcium per day:  Yes    Bi-annual eye exam:  Yes    Dental care twice a year:  Yes    Sleep apnea or symptoms of sleep apnea:  None    Diet:  Regular (no restrictions)    Frequency of exercise:  1 day/week    Duration of exercise:  Less than 15 minutes    Taking medications regularly:  Yes    Medication side effects:  None    PHQ-2 Total Score: 2    Additional concerns today:  No    Depression and Anxiety Follow-Up    How are you doing with your depression since your last visit? No change    How are you doing with your anxiety since your last visit?  Worsened ,not on any meds at this time, has tried lexapro in the past     Are you having other symptoms that might be associated with depression or anxiety? No    Have you had a significant life event? No     Do you have any concerns with your use of alcohol or other drugs? No    PHQ 12/19/2019 6/23/2021 2/15/2022   PHQ-9 Total Score 13 1 2   Q9: Thoughts of better off dead/self-harm past 2 weeks Not at all Not at all Not at all     ELVIN-7 SCORE 8/30/2018 12/19/2019 2/15/2022   Total Score 9 (mild anxiety) 10 (moderate anxiety) -   Total Score 9 10 8      56}    Today's PHQ-2 Score:   PHQ-2 ( 1999 Pfizer) 2/14/2022   Q1: Little interest or pleasure in doing things 2   Q2: Feeling down, depressed or hopeless 0   PHQ-2 Score 2   PHQ-2 Total Score (12-17 Years)- Positive if 3 or more points; Administer PHQ-A if positive -   Q1: Little interest or pleasure in doing things More than half the days   Q2: Feeling down, depressed or hopeless Not at all   PHQ-2 Score 2       Abuse: Current or Past (Physical, Sexual or Emotional) - No  Do you feel safe in your environment? Yes    Past Medical History:   Diagnosis Date     Abnormal Pap  smear      Herpes simplex without mention of complication      Left breast lump      Lump of right breast      Thyroid disorder     as a child unknown if hyper or hypo       Past Surgical History:   Procedure Laterality Date      SECTION N/A 2015    Procedure:  SECTION;  Surgeon: Yohana Monroy MD;  Location: [facility] L+[facility]      SECTION N/A 1/3/2017    Procedure:  SECTION;  Surgeon: Jerzy Camacho MD;  Location: [facility] L+[facility]       Current Outpatient Medications   Medication Sig Dispense Refill     multivitamin w/minerals (MULTI-VITAMIN) tablet Take 1 tablet by mouth daily       Oxyquinoline-Sod Lauryl Sulf 0.025-0.01 % GEL Place 1 applicator vaginally daily With pessary change as directed 113.4 g 3       Family History   Problem Relation Age of Onset     Cerebrovascular Disease Father      Hypertension Mother      Cervical Cancer Mother        Social History     Tobacco Use     Smoking status: Never Smoker     Smokeless tobacco: Never Used   Substance Use Topics     Alcohol use: No     Alcohol/week: 0.0 standard drinks     If you drink alcohol do you typically have >3 drinks per day or >7 drinks per week? No    Alcohol Use 2022   Prescreen: >3 drinks/day or >7 drinks/week? No   Prescreen: >3 drinks/day or >7 drinks/week? -   No flowsheet data found.    Reviewed orders with patient.  Reviewed health maintenance and updated orders accordingly - Yes       Breast Cancer Screening:    Breast CA Risk Assessment (FHS-7) 2022   Do you have a family history of breast, colon, or ovarian cancer? No / Unknown        History of abnormal Pap smear:   - age 30-65 PAP every 5 years with negative HPV co-testing recommended  PAP / HPV Latest Ref Rng & Units 2017   PAP (Historical) - NIL NIL   HPV16 NEG:Negative Negative -   HPV18 NEG:Negative Negative -   HRHPV NEG:Negative Negative -     Reviewed and updated as needed this visit by clinical staff                Reviewed  "and updated as needed this visit by Provider                 Review of Systems   Constitutional: Negative.    HENT: Negative.    Eyes: Negative.    Respiratory: Negative.    Cardiovascular: Negative.    Gastrointestinal: Negative.    Endocrine: Negative.    Breasts:  Negative for tenderness, breast mass and discharge.   Genitourinary: Negative for pelvic pain, vaginal bleeding and vaginal discharge.   Musculoskeletal: Negative.    Neurological: Negative.    Psychiatric/Behavioral: The patient is nervous/anxious.          OBJECTIVE:   /70   Pulse 88   Temp 97.4  F (36.3  C) (Axillary)   Resp 11   Ht 1.511 m (4' 11.5\")   Wt 60.6 kg (133 lb 8 oz)   LMP 02/12/2022   SpO2 100%   BMI 26.51 kg/m    Physical Exam  GENERAL: healthy, alert and no distress  EYES: Eyes grossly normal to inspection, PERRL and conjunctivae and sclerae normal  NECK: no adenopathy, no asymmetry, masses, or scars and thyroid normal to palpation  RESP: lungs clear to auscultation - no rales, rhonchi or wheezes  BREAST: normal without masses, tenderness or nipple discharge and no palpable axillary masses or adenopathy  CV: regular rate and rhythm, normal S1 S2, no S3 or S4, no murmur, click or rub, no peripheral edema and peripheral pulses strong  ABDOMEN: soft, nontender, no hepatosplenomegaly, no masses and bowel sounds normal   (female): pt has menses today , pap not done ,due for pap 11/22  MS: no gross musculoskeletal defects noted, no edema  NEURO: Normal strength and tone, mentation intact and speech normal  PSYCH: mentation appears normal, affect normal/bright    ASSESSMENT/PLAN:       (Z00.00) Routine history and physical examination of adult  (primary encounter diagnosis)  Plan: Comprehensive metabolic panel, Lipid panel         reflex to direct LDL Fasting, CBC with         platelets, TSH with free T4 reflex, MA         Screening Digital Bilateral, Adult Gastro Ref -        Procedure Only            (F41.1) ELVIN (generalized " "anxiety disorder)  Plan: started on sertraline (ZOLOFT) 50 MG tablet -advised to take half a tablet daily for 1 week and then increase to 1 tablet daily as directed.explained clearly about the medication,insructions and side effects.  Advised virtual visit in 3 to 4 weeks            (F33.1) Moderate episode of recurrent major depressive disorder (H)  Plan: Zoloft as above       (Z23) High priority for 2019-nCoV vaccine  Plan: COVID-19,PF,PFIZER (12+ Yrs GRAY LABEL)            Patient has been advised of split billing requirements and indicates understanding: Yes    COUNSELING:  Reviewed preventive health counseling, as reflected in patient instructions       Regular exercise       Healthy diet/nutrition       Immunizations    Vaccinated for: Pfizer COVID-19 booster vaccine      Estimated body mass index is 26.51 kg/m  as calculated from the following:    Height as of this encounter: 1.511 m (4' 11.5\").    Weight as of this encounter: 60.6 kg (133 lb 8 oz).    Weight management plan: Discussed healthy diet and exercise guidelines    She reports that she has never smoked. She has never used smokeless tobacco.      Counseling Resources:  ATP IV Guidelines  Pooled Cohorts Equation Calculator  Breast Cancer Risk Calculator  BRCA-Related Cancer Risk Assessment: FHS-7 Tool  FRAX Risk Assessment  ICSI Preventive Guidelines  Dietary Guidelines for Americans, 2010  USDA's MyPlate  ASA Prophylaxis  Lung CA Screening    Carmen Atkins MD  [facility]  "

## 2022-02-15 NOTE — NURSING NOTE
"/70   Pulse 88   Temp 97.4  F (36.3  C) (Axillary)   Resp 11   Ht 1.511 m (4' 11.5\")   Wt 60.6 kg (133 lb 8 oz)   LMP 02/12/2022   SpO2 100%   BMI 26.51 kg/m    Patient in for Female Px.  "

## 2022-02-16 ASSESSMENT — ANXIETY QUESTIONNAIRES: GAD7 TOTAL SCORE: 8

## 2022-02-23 ENCOUNTER — LAB (OUTPATIENT)
Dept: LAB | Facility: CLINIC | Age: 47
End: 2022-02-23
Payer: COMMERCIAL

## 2022-02-23 DIAGNOSIS — Z00.00 ROUTINE HISTORY AND PHYSICAL EXAMINATION OF ADULT: ICD-10-CM

## 2022-02-23 LAB
ERYTHROCYTE [DISTWIDTH] IN BLOOD BY AUTOMATED COUNT: 12.5 % (ref 10–15)
HCT VFR BLD AUTO: 39.2 % (ref 35–47)
HGB BLD-MCNC: 12.7 G/DL (ref 11.7–15.7)
MCH RBC QN AUTO: 28.5 PG (ref 26.5–33)
MCHC RBC AUTO-ENTMCNC: 32.4 G/DL (ref 31.5–36.5)
MCV RBC AUTO: 88 FL (ref 78–100)
PLATELET # BLD AUTO: 442 10E3/UL (ref 150–450)
RBC # BLD AUTO: 4.45 10E6/UL (ref 3.8–5.2)
WBC # BLD AUTO: 8.3 10E3/UL (ref 4–11)

## 2022-02-23 PROCEDURE — 80061 LIPID PANEL: CPT

## 2022-02-23 PROCEDURE — 85027 COMPLETE CBC AUTOMATED: CPT

## 2022-02-23 PROCEDURE — 36415 COLL VENOUS BLD VENIPUNCTURE: CPT

## 2022-02-23 PROCEDURE — 80053 COMPREHEN METABOLIC PANEL: CPT

## 2022-02-23 PROCEDURE — 84443 ASSAY THYROID STIM HORMONE: CPT

## 2022-02-24 LAB
ALBUMIN SERPL-MCNC: 3.8 G/DL (ref 3.4–5)
ALP SERPL-CCNC: 90 U/L (ref 40–150)
ALT SERPL W P-5'-P-CCNC: 40 U/L (ref 0–50)
ANION GAP SERPL CALCULATED.3IONS-SCNC: 9 MMOL/L (ref 3–14)
AST SERPL W P-5'-P-CCNC: 28 U/L (ref 0–45)
BILIRUB SERPL-MCNC: 0.4 MG/DL (ref 0.2–1.3)
BUN SERPL-MCNC: 7 MG/DL (ref 7–30)
CALCIUM SERPL-MCNC: 9.1 MG/DL (ref 8.5–10.1)
CHLORIDE BLD-SCNC: 106 MMOL/L (ref 94–109)
CHOLEST SERPL-MCNC: 177 MG/DL
CO2 SERPL-SCNC: 23 MMOL/L (ref 20–32)
CREAT SERPL-MCNC: 0.72 MG/DL (ref 0.52–1.04)
FASTING STATUS PATIENT QL REPORTED: YES
GFR SERPL CREATININE-BSD FRML MDRD: >90 ML/MIN/1.73M2
GLUCOSE BLD-MCNC: 98 MG/DL (ref 70–99)
HDLC SERPL-MCNC: 44 MG/DL
LDLC SERPL CALC-MCNC: 108 MG/DL
NONHDLC SERPL-MCNC: 133 MG/DL
POTASSIUM BLD-SCNC: 4.5 MMOL/L (ref 3.4–5.3)
PROT SERPL-MCNC: 7.8 G/DL (ref 6.8–8.8)
SODIUM SERPL-SCNC: 138 MMOL/L (ref 133–144)
TRIGL SERPL-MCNC: 124 MG/DL
TSH SERPL DL<=0.005 MIU/L-ACNC: 0.93 MU/L (ref 0.4–4)

## 2022-03-14 ASSESSMENT — ANXIETY QUESTIONNAIRES
5. BEING SO RESTLESS THAT IT IS HARD TO SIT STILL: SEVERAL DAYS
GAD7 TOTAL SCORE: 14
4. TROUBLE RELAXING: NEARLY EVERY DAY
7. FEELING AFRAID AS IF SOMETHING AWFUL MIGHT HAPPEN: MORE THAN HALF THE DAYS
2. NOT BEING ABLE TO STOP OR CONTROL WORRYING: MORE THAN HALF THE DAYS
6. BECOMING EASILY ANNOYED OR IRRITABLE: SEVERAL DAYS
1. FEELING NERVOUS, ANXIOUS, OR ON EDGE: MORE THAN HALF THE DAYS
GAD7 TOTAL SCORE: 14
3. WORRYING TOO MUCH ABOUT DIFFERENT THINGS: NEARLY EVERY DAY
7. FEELING AFRAID AS IF SOMETHING AWFUL MIGHT HAPPEN: MORE THAN HALF THE DAYS
GAD7 TOTAL SCORE: 14

## 2022-03-14 ASSESSMENT — PATIENT HEALTH QUESTIONNAIRE - PHQ9
SUM OF ALL RESPONSES TO PHQ QUESTIONS 1-9: 12
10. IF YOU CHECKED OFF ANY PROBLEMS, HOW DIFFICULT HAVE THESE PROBLEMS MADE IT FOR YOU TO DO YOUR WORK, TAKE CARE OF THINGS AT HOME, OR GET ALONG WITH OTHER PEOPLE: SOMEWHAT DIFFICULT
SUM OF ALL RESPONSES TO PHQ QUESTIONS 1-9: 12

## 2022-03-15 ENCOUNTER — VIRTUAL VISIT (OUTPATIENT)
Dept: INTERNAL MEDICINE | Facility: CLINIC | Age: 47
End: 2022-03-15
Payer: COMMERCIAL

## 2022-03-15 DIAGNOSIS — F41.1 GAD (GENERALIZED ANXIETY DISORDER): ICD-10-CM

## 2022-03-15 DIAGNOSIS — F33.1 MODERATE EPISODE OF RECURRENT MAJOR DEPRESSIVE DISORDER (H): Primary | ICD-10-CM

## 2022-03-15 PROCEDURE — 99214 OFFICE O/P EST MOD 30 MIN: CPT | Mod: GT | Performed by: INTERNAL MEDICINE

## 2022-03-15 ASSESSMENT — ANXIETY QUESTIONNAIRES
6. BECOMING EASILY ANNOYED OR IRRITABLE: SEVERAL DAYS
3. WORRYING TOO MUCH ABOUT DIFFERENT THINGS: SEVERAL DAYS
GAD7 TOTAL SCORE: 4
IF YOU CHECKED OFF ANY PROBLEMS ON THIS QUESTIONNAIRE, HOW DIFFICULT HAVE THESE PROBLEMS MADE IT FOR YOU TO DO YOUR WORK, TAKE CARE OF THINGS AT HOME, OR GET ALONG WITH OTHER PEOPLE: SOMEWHAT DIFFICULT
1. FEELING NERVOUS, ANXIOUS, OR ON EDGE: NOT AT ALL
5. BEING SO RESTLESS THAT IT IS HARD TO SIT STILL: NOT AT ALL
2. NOT BEING ABLE TO STOP OR CONTROL WORRYING: SEVERAL DAYS
7. FEELING AFRAID AS IF SOMETHING AWFUL MIGHT HAPPEN: SEVERAL DAYS

## 2022-03-15 ASSESSMENT — PATIENT HEALTH QUESTIONNAIRE - PHQ9: 5. POOR APPETITE OR OVEREATING: NOT AT ALL

## 2022-03-15 NOTE — PROGRESS NOTES
Katie is a 46 year old who is being evaluated via a billable video visit.      How would you like to obtain your AVS? MyChart  If the video visit is dropped, the invitation should be resent by: Text to cell phone: [phone]  Will anyone else be joining your video visit? No      Video Start Time: 11:33 AM    Assessment & Plan      (F33.1) Moderate episode of recurrent major depressive disorder (H)  (primary encounter diagnosis)  (F41.1) ELVIN (generalized anxiety disorder)  Plan: Minimal improvement, increase sertraline (ZOLOFT) 50 MG tablet to 75 mg daily as directed.explained clearly about the medication,insructions and side effects.  Advised virtual visit in 4 weeks.Call or return to clinic prn if these symtoms worsen, fail to improve as anticipated, or if new symptoms develop.           Prescription drug management       Return in about 4 weeks (around 4/12/2022) for Depression Follow up, Anxiety follow up.    Carmen Atkins MD  [facility]   Katie is a 46 year old who presents for the following health issues          Depression and Anxiety Follow-Up    How are you doing with your depression since your last visit? No change , patient was a started on Zoloft 50 mg daily at last office visit tolerating well with some symptom relief but not much  .  Still has symptoms of depression and some anxiety.  Patient states that she has a trip coming up soon and feels anxious about that    How are you doing with your anxiety since your last visit?  improved     Are you having other symptoms that might be associated with depression or anxiety? No    Have you had a significant life event? No     Do you have any concerns with your use of alcohol or other drugs? No      PHQ 2/15/2022 3/14/2022 3/15/2022   PHQ-9 Total Score 2 12 10   Q9: Thoughts of better off dead/self-harm past 2 weeks Not at all Not at all Not at all     ELVIN-7 SCORE 2/15/2022 3/14/2022 3/15/2022   Total  Score - 14 (moderate anxiety) -   Total Score 8 14 4      [id number]}    How many servings of fruits and vegetables do you eat daily?  2-3    On average, how many sweetened beverages do you drink each day (Examples: soda, juice, sweet tea, etc.  Do NOT count diet or artificially sweetened beverages)?   2    How many days per week do you exercise enough to make your heart beat faster? 3 or less    How many minutes a day do you exercise enough to make your heart beat faster? 9 or less    How many days per week do you miss taking your medication? 0    Past Medical History:   Diagnosis Date     Abnormal Pap smear      Herpes simplex without mention of complication      Left breast lump      Lump of right breast      Thyroid disorder     as a child unknown if hyper or hypo       Current Outpatient Medications   Medication Sig Dispense Refill     multivitamin w/minerals (MULTI-VITAMIN) tablet Take 1 tablet by mouth daily       Oxyquinoline-Sod Lauryl Sulf 0.025-0.01 % GEL Place 1 applicator vaginally daily With pessary change as directed 113.4 g 3     sertraline (ZOLOFT) 50 MG tablet Take 1.5 tablets (75 mg) by mouth daily 45 tablet 1         Review of Systems   CONSTITUTIONAL: NEGATIVE for fever, chills, change in weight  RESP: NEGATIVE for significant cough or SOB  CV: NEGATIVE for chest pain, palpitations or peripheral edema  PSYCHIATRIC: anxiety and depressed mood      Objective           Vitals:  No vitals were obtained today due to virtual visit.    Physical Exam   GENERAL: Healthy, alert and no distress  RESP: No audible wheeze, cough, or visible cyanosis.  No visible retractions or increased work of breathing.    PSYCH: Mentation appears normal, affect normal/bright, judgement and insight intact, normal speech and appearance well-groomed.           Video-Visit Details    Type of service:  Video Visit    Video End Time:11:45 AM    Originating Location (pt. Location): Home    Distant Location (provider location):  M  [facility]     Platform used for Video Visit: César

## 2022-04-04 ENCOUNTER — OFFICE VISIT (OUTPATIENT)
Dept: OBGYN | Facility: CLINIC | Age: 47
End: 2022-04-04
Payer: COMMERCIAL

## 2022-04-04 VITALS — WEIGHT: 130 LBS | BODY MASS INDEX: 25.82 KG/M2 | SYSTOLIC BLOOD PRESSURE: 98 MMHG | DIASTOLIC BLOOD PRESSURE: 60 MMHG

## 2022-04-04 DIAGNOSIS — R35.1 NOCTURIA: Primary | ICD-10-CM

## 2022-04-04 DIAGNOSIS — N81.4 CYSTOCELE WITH SMALL RECTOCELE AND UTERINE DESCENT: ICD-10-CM

## 2022-04-04 PROCEDURE — 99213 OFFICE O/P EST LOW 20 MIN: CPT | Performed by: OBSTETRICS & GYNECOLOGY

## 2022-04-04 RX ORDER — TOLTERODINE 4 MG/1
4 CAPSULE, EXTENDED RELEASE ORAL DAILY
Qty: 36 CAPSULE | Refills: 1 | Status: SHIPPED | OUTPATIENT
Start: 2022-04-04 | End: 2022-09-07

## 2022-04-04 NOTE — PROGRESS NOTES
Katie Wilson is a 46 year old female  condoms for contraception not on HRT who presents today for a follow-up of the office visit of 10/8/2020 and 10/22/20 , 2020, 21, 2021 and 21 and 21`.  I was asked To see the patient by Dr. Staley for evaluation of uterine prolapse and mixed urinary incontinence which have become more prominent since the birth of her last child.  At the time of her urodynamic study on 10/8/2020 there was evidence of pelvic floor relaxation with a symptomatic uterine prolapse, cystocele and hypermobility of the urethrovesical angle with subsequent urinary stress incontinence with an overactive bladder, urgency frequency and urge incontinence. She does experience nocturia of 2-3 times at night and is asking what can be done to help with this.    I again today reviewed the findings with the patient and discussed with her the risks benefits and alternative forms of therapy.  We reviewed the pathophysiology of the disease process and at this point the patient the patient is using a number 4 diaphragm pessary with good results and desires to continue with this form of therapy. The pt is menstruating today and does not have her pessary in place    Past Medical History:   Diagnosis Date     Abnormal Pap smear      Herpes simplex without mention of complication      Left breast lump      Lump of right breast      Thyroid disorder     as a child unknown if hyper or hypo     Current Outpatient Medications   Medication     multivitamin w/minerals (MULTI-VITAMIN) tablet     Oxyquinoline-Sod Lauryl Sulf 0.025-0.01 % GEL     sertraline (ZOLOFT) 50 MG tablet     tolterodine ER (DETROL LA) 4 MG 24 hr capsule     No current facility-administered medications for this visit.     BP 98/60   Wt 59 kg (130 lb)   LMP 2022   BMI 25.82 kg/m    Constitutional: healthy, alert and no distress  Genitourinary: Normal external genitalia without lesions and Spec: normal appearing  vaginal tissues.  grade 1 midline cystocele with grade 2 uterine descent to the introitus.  Multiparous appearing cervix menstrual type flow was present.  There is a grade 1-2 distal rectocele with normal sphincter tone.  There is no evidence of ulceration,     (R35.1) Nocturia  (primary encounter diagnosis)  Comment: no evidence of infection  Fluid mgmnt and avoidance of caffeine derivatives discussed  Will try an antimuscarinic med and reassess  Plan: tolterodine ER (DETROL LA) 4 MG 24 hr capsule        Done pt to let us know how this works    (N81.4) Cystocele with small rectocele and uterine descent  Comment: as above  Plan: RTC 3 months or prn

## 2022-04-04 NOTE — PATIENT INSTRUCTIONS
You can reach your [facility] Care Team any time of the day by calling [phone]. This number will put you in touch with the 24 hour nurse line if the clinic is closed.    To contact your OB/GYN Station Coordinator/Surgery Scheduler please call [phone]. This is a direct number for your care team between 8 a.m. and 4 p.m. Monday through Friday.    [facility] is open for your convenience:  Monday through Friday 8 a.m. to 6 p.m.  Closed weekends and all major holidays.

## 2022-04-04 NOTE — NURSING NOTE
"Chief Complaint   Patient presents with     Pessary Check/Fit/Insert       Initial BP 98/60   Wt 59 kg (130 lb)   LMP 2022   BMI 25.82 kg/m   Estimated body mass index is 25.82 kg/m  as calculated from the following:    Height as of 2/15/22: 1.511 m (4' 11.5\").    Weight as of this encounter: 59 kg (130 lb).  BP completed using cuff size: regular    Questioned patient about current smoking habits.  Pt. has never smoked.          The following HM Due: NONE      The following patient reported/Care Every where data was sent to:  P ABSTRACT QUALITY INITIATIVES [[id number]]        Becki Mays CMA                 "

## 2022-04-20 ENCOUNTER — HOSPITAL ENCOUNTER (OUTPATIENT)
Dept: MAMMOGRAPHY | Facility: CLINIC | Age: 47
Discharge: HOME | End: 2022-04-20
Attending: INTERNAL MEDICINE | Admitting: INTERNAL MEDICINE
Payer: COMMERCIAL

## 2022-04-20 DIAGNOSIS — Z00.00 ROUTINE HISTORY AND PHYSICAL EXAMINATION OF ADULT: ICD-10-CM

## 2022-04-20 PROCEDURE — 77067 SCR MAMMO BI INCL CAD: CPT

## 2022-04-25 ENCOUNTER — LAB (OUTPATIENT)
Dept: LAB | Facility: CLINIC | Age: 47
End: 2022-04-25
Attending: FAMILY MEDICINE
Payer: COMMERCIAL

## 2022-04-25 DIAGNOSIS — Z20.822 ENCOUNTER FOR LABORATORY TESTING FOR COVID-19 VIRUS: ICD-10-CM

## 2022-04-25 LAB — SARS-COV-2 RNA RESP QL NAA+PROBE: NEGATIVE

## 2022-04-25 PROCEDURE — U0003 INFECTIOUS AGENT DETECTION BY NUCLEIC ACID (DNA OR RNA); SEVERE ACUTE RESPIRATORY SYNDROME CORONAVIRUS 2 (SARS-COV-2) (CORONAVIRUS DISEASE [COVID-19]), AMPLIFIED PROBE TECHNIQUE, MAKING USE OF HIGH THROUGHPUT TECHNOLOGIES AS DESCRIBED BY CMS-2020-01-R: HCPCS

## 2022-04-25 PROCEDURE — U0005 INFEC AGEN DETEC AMPLI PROBE: HCPCS

## 2022-08-04 ENCOUNTER — OFFICE VISIT (OUTPATIENT)
Dept: INTERNAL MEDICINE | Facility: CLINIC | Age: 47
End: 2022-08-04
Payer: COMMERCIAL

## 2022-08-04 VITALS
HEIGHT: 60 IN | HEART RATE: 74 BPM | BODY MASS INDEX: 25.23 KG/M2 | DIASTOLIC BLOOD PRESSURE: 61 MMHG | OXYGEN SATURATION: 99 % | WEIGHT: 128.5 LBS | SYSTOLIC BLOOD PRESSURE: 95 MMHG | RESPIRATION RATE: 16 BRPM | TEMPERATURE: 97.5 F

## 2022-08-04 DIAGNOSIS — F33.1 MODERATE EPISODE OF RECURRENT MAJOR DEPRESSIVE DISORDER (H): Primary | ICD-10-CM

## 2022-08-04 PROCEDURE — 99214 OFFICE O/P EST MOD 30 MIN: CPT | Performed by: INTERNAL MEDICINE

## 2022-08-04 RX ORDER — ESCITALOPRAM OXALATE 10 MG/1
10 TABLET ORAL DAILY
Qty: 31 TABLET | Refills: 0 | Status: SHIPPED | OUTPATIENT
Start: 2022-08-04 | End: 2022-09-07

## 2022-08-04 ASSESSMENT — PATIENT HEALTH QUESTIONNAIRE - PHQ9
10. IF YOU CHECKED OFF ANY PROBLEMS, HOW DIFFICULT HAVE THESE PROBLEMS MADE IT FOR YOU TO DO YOUR WORK, TAKE CARE OF THINGS AT HOME, OR GET ALONG WITH OTHER PEOPLE: VERY DIFFICULT
SUM OF ALL RESPONSES TO PHQ QUESTIONS 1-9: 15

## 2022-08-04 NOTE — NURSING NOTE
"BP 95/61   Pulse 74   Temp 97.5  F (36.4  C) (Tympanic)   Resp 16   Ht 1.511 m (4' 11.5\")   Wt 58.3 kg (128 lb 8 oz)   LMP 07/21/2022   SpO2 99%   BMI 25.52 kg/m    Patient in for medication recheck.  "

## 2022-08-04 NOTE — PROGRESS NOTES
Assessment & Plan     (F33.1) Moderate episode of recurrent major depressive disorder (H)  (primary encounter diagnosis)  Plan: Patient was advised to discontinue Zoloft, started on escitalopram (LEXAPRO) 10 MG tablet once daily as directed.explained clearly about the medication,insructions and side effects.  I recommended a therapist and psychiatrist referral, patient states she seen therapist before and has not had any and does not want to see psychiatrist at this time.  Patient was advised to make virtual visit in 3 to 4 weeks.           Prescription drug management     Depression Screening Follow Up    PHQ 8/4/2022   PHQ-9 Total Score 14   Q9: Thoughts of better off dead/self-harm past 2 weeks Not at all   F/U: Thoughts of suicide or self-harm No   F/U: Safety concerns No        Follow Up  Follow Up Actions Taken  Patient declined referral.       Return in about 3 weeks (around 8/25/2022) for Depression Follow up.    Carmen Atkins MD  [facility] ROSA Wilson is a 47 year old presenting for the following health issues:  Follow Up      History of Present Illness       Mental Health Follow-up:  Patient presents to follow-up on Depression.Patient's depression since last visit has been:  Medium  The patient is having other symptoms associated with depression.      Any significant life events: other  Patient is not feeling anxious or having panic attacks.  Patient has no concerns about alcohol or drug use.    She eats 2-3 servings of fruits and vegetables daily.She consumes 1 sweetened beverage(s) daily.She exercises with enough effort to increase her heart rate 9 or less minutes per day.  She exercises with enough effort to increase her heart rate 3 or less days per week. She is missing 2 dose(s) of medications per week.    Today's PHQ-9         PHQ-9 Total Score: 15    PHQ-9 Q9 Thoughts of better off dead/self-harm past 2 weeks :   Not at all (pt denie suicidal  ideation or thoughts)  Thoughts of suicide or self harm: (P) No  Self-harm Plan:     Self-harm Action:       Safety concerns for self or others: (P) No    How difficult have these problems made it for you to do your work, take care of things at home, or get along with other people: Very difficult       Depression and Anxiety Follow-Up    How are you doing with your depression since your last visit? Worsened , currently on Zoloft 50 mg daily, Zoloft was increased to 75 mg at last office visit but the patient took it only for few days and felt no emotions.  She resumed Zoloft 50 mg.  Patient states her main depression and anxiety symptoms are due to her kids, patient states her kids are diagnosed with ADD ADHD, patient has her mother and   living with her.    How are you doing with your anxiety since your last visit?  Worsened     Are you having other symptoms that might be associated with depression or anxiety? No    Have you had a significant life event? OTHER: kids concerns     Do you have any concerns with your use of alcohol or other drugs? No      PHQ 3/14/2022 3/15/2022 8/4/2022   PHQ-9 Total Score 12 10 14   Q9: Thoughts of better off dead/self-harm past 2 weeks Not at all Not at all Not at all   F/U: Thoughts of suicide or self-harm - - No   F/U: Safety concerns - - No        Past Medical History:   Diagnosis Date     Abnormal Pap smear      Herpes simplex without mention of complication      Left breast lump      Lump of right breast      Thyroid disorder     as a child unknown if hyper or hypo       Current Outpatient Medications   Medication Sig Dispense Refill     escitalopram (LEXAPRO) 10 MG tablet Take 1 tablet (10 mg) by mouth daily 31 tablet 0     multivitamin w/minerals (THERA-VIT-M) tablet Take 1 tablet by mouth daily       Oxyquinoline-Sod Lauryl Sulf 0.025-0.01 % GEL Place 1 applicator vaginally daily With pessary change as directed 113.4 g 3     tolterodine ER (DETROL LA) 4 MG 24 hr  "capsule Take 1 capsule (4 mg) by mouth daily 36 capsule 1         Review of Systems   CONSTITUTIONAL: NEGATIVE for fever, chills, change in weight  CV: NEGATIVE for chest pain, palpitations or peripheral edema  PSYCHIATRIC: HX anxiety and HX depression      Objective    BP 95/61   Pulse 74   Temp 97.5  F (36.4  C) (Tympanic)   Resp 16   Ht 1.511 m (4' 11.5\")   Wt 58.3 kg (128 lb 8 oz)   LMP 07/21/2022   SpO2 99%   BMI 25.52 kg/m    Body mass index is 25.52 kg/m .  Physical Exam   GENERAL: healthy, alert and no distress  RESP: lungs clear to auscultation - no rales, rhonchi or wheezes  CV: regular rate and rhythm, normal S1 S2,    PSYCH: mentation appears normal, affect normal/bright         .  ..  "

## 2022-08-04 NOTE — COMMUNITY RESOURCES LIST (ENGLISH)
08/04/2022   [facility] - Outpatient Clinics  N/A  For questions about this resource list or additional care needs, please contact your primary care clinic or care manager.  Phone: [phone]   Email: N/A   Address: [address] [place] [address]   Hours: N/A        Hotlines and Helplines       Hotline - Crisis help  1  [facility] - Child Protection - [facility] Crisis Response Unit Distance: 0.97 miles      COVID-19 Status: Phone/Virtual   [address] [place] [address]  Language: English  Hours: Mon - Sun Open 24 Hours   Phone: (770) 268-3829 Email: [email or web address]. Website: [email or web address]/[email or web address]     2  [facility] Crisis Response Unit - Suicide and Crisis Response Hotline Distance: 12.26 miles      COVID-19 Status: Phone/Virtual   [address] [place] [address]  Language: English  Hours: Mon - Sun Open 24 Hours   Phone: (523) 849-7154 Email: ana@[email or web address] Website: [email or web address]/HealthFamily/HandlingEmergencies/Help          Mental Health       Individual counseling  3  [facility] - Behavioral support services Distance: 0.48 miles      COVID-19 Status: Regular Operations   [address] [place] [address]  Language: English  Hours: Mon - Fri 9:00 AM - 5:00 PM  Fees: Self Pay   Phone: (170) 881-4408 Email: info@[employer] Website: http://www.[employer]     4  [facility] Distance: 0.48 miles      COVID-19 Status: Regular Operations, COVID-19 Status: Phone/Virtual   [address] [place] [address]  Language: English  Hours: Mon - Fri 9:00 AM - 5:00 PM Appt. Only  Fees: Insurance, Self Pay   Phone: (960) 951-3614 Email: SocialPandascoChinese Online@[employer] Website: http://www.[employer]     Mental health crisis care  5  [facility] Distance: 7.54 miles      COVID-19 Status: Regular Operations, COVID-19  Status: Phone/Virtual   [address] [place] [address]  Language: English  Hours: Mon - Thu 8:30 AM - 9:00 PM , Fri 8:30 AM - 5:00 PM , Sat 8:00 AM - 4:00 PM  Fees: Insurance, Self Pay, Sliding Fee   Phone: (595) 730-8415 Email: erin@[employer] Website: https://www.[employer]/locations/aman     6  [facility] [employer] [facility] - 24-Hour Mental Health Practitioner Distance: 11.09 miles      COVID-19 Status: Regular Operations, COVID-19 Status: Phone/Virtual   [address] [place] [address]  Language: English, Hmong  Hours: Mon - Fri 8:00 AM - 4:30 PM  Fees: Insurance, Self Pay   Phone: (721) 426-7116 Email: info@[employer] Website: http://www.[employer]/     Mental health support group  7  [facility] Distance: 5.48 miles      COVID-19 Status: Phone/Virtual   [address] [address] [place] [address]  Language: English  Hours: Mon - Fri 9:00 AM - 5:00 PM Appt. Only  Fees: Free   Phone: (703) 541-6309 Email: Adduplexcojagruti@[employer] Website: http://www.[employer].org/     8  [facility] (Main Office) Distance: 8.47 miles      COVID-19 Status: Phone/Virtual   [address] [place] [address]  Language: English  Hours: Mon - Fri 9:00 AM - 5:00 PM  Fees: Free   Phone: (945) 225-9505 Email: info@[employer].org Website: http://[employer].org          Important Numbers & Websites       Emergency Services   911  City Services   311  Poison Control   (439) 467-2911  Suicide Prevention Lifeline   (552) 666-9953 (TALK)  Child Abuse Hotline   (219) 529-9332 (4-A-Child)  Sexual Assault Hotline   (808) 830-2263 (HOPE)  National Runaway Safeline   (469) 731-5062 (RUNAWAY)  All-Options Talkline   (351) 137-3934  Substance Abuse Referral   (340) 287-9938 (HELP)

## 2022-09-07 ENCOUNTER — VIRTUAL VISIT (OUTPATIENT)
Dept: INTERNAL MEDICINE | Facility: CLINIC | Age: 47
End: 2022-09-07
Payer: COMMERCIAL

## 2022-09-07 DIAGNOSIS — Z12.11 SCREEN FOR COLON CANCER: ICD-10-CM

## 2022-09-07 DIAGNOSIS — F33.1 MODERATE EPISODE OF RECURRENT MAJOR DEPRESSIVE DISORDER (H): ICD-10-CM

## 2022-09-07 PROCEDURE — 99213 OFFICE O/P EST LOW 20 MIN: CPT | Mod: GT | Performed by: INTERNAL MEDICINE

## 2022-09-07 RX ORDER — ESCITALOPRAM OXALATE 10 MG/1
10 TABLET ORAL DAILY
Qty: 90 TABLET | Refills: 0 | Status: SHIPPED | OUTPATIENT
Start: 2022-09-07 | End: 2023-02-06

## 2022-09-07 ASSESSMENT — PATIENT HEALTH QUESTIONNAIRE - PHQ9: SUM OF ALL RESPONSES TO PHQ QUESTIONS 1-9: 4

## 2022-09-07 NOTE — PROGRESS NOTES
Katie is a 47 year old who is being evaluated via a billable video visit.      How would you like to obtain your AVS? MyChart  If the video visit is dropped, the invitation should be resent by: Text to cell phone: [phone]  Will anyone else be joining your video visit? No          Assessment & Plan     (F33.1) Moderate episode of recurrent major depressive disorder (H)  Comment: Improved significantly  Plan: escitalopram (LEXAPRO) 10 MG tablet refilled as directed.explained clearly about the medication,insructions and side effects.  Follow-up in 3 months           (Z12.11) Screen for colon cancer  Plan: Colonoscopy Screening  Referral              Prescription drug management       Return in about 3 months (around 12/7/2022).    Carmen Atkins MD  [facility]   Katie is a 47 year old presenting for the following health issues:  Follow Up      HPI       Depression Followup    How are you doing with your depression since your last visit? Improved .  Patient was started on Lexapro 10 mg at last office visit tolerating well.    Are you having other symptoms that might be associated with depression? No    Have you had a significant life event?  OTHER: Will be starting new job from tomorrow     Are you feeling anxious or having panic attacks?   No    Do you have any concerns with your use of alcohol or other drugs? No      PHQ 3/15/2022 8/4/2022 9/7/2022   PHQ-9 Total Score 10 14 4   Q9: Thoughts of better off dead/self-harm past 2 weeks Not at all Not at all Not at all   F/U: Thoughts of suicide or self-harm - No -   F/U: Safety concerns - No -            Past Medical History:   Diagnosis Date     Abnormal Pap smear      Herpes simplex without mention of complication      Left breast lump      Lump of right breast      Thyroid disorder     as a child unknown if hyper or hypo       Current Outpatient Medications   Medication Sig Dispense Refill      escitalopram (LEXAPRO) 10 MG tablet Take 1 tablet (10 mg) by mouth daily 90 tablet 0     multivitamin w/minerals (THERA-VIT-M) tablet Take 1 tablet by mouth daily       Oxyquinoline-Sod Lauryl Sulf 0.025-0.01 % GEL Place 1 applicator vaginally daily With pessary change as directed 113.4 g 3         Review of Systems   CONSTITUTIONAL: NEGATIVE for fever, chills, change in weight  RESP: NEGATIVE for significant cough or SOB  PSYCHIATRIC: NEGATIVE for changes in mood or affect      Objective           Vitals:  No vitals were obtained today due to virtual visit.    Physical Exam   GENERAL: Healthy, alert and no distress  EYES: Eyes grossly normal to inspection.  No discharge or erythema, or obvious scleral/conjunctival abnormalities.  RESP: No audible wheeze, cough, or visible cyanosis.  No visible retractions or increased work of breathing.    PSYCH: Mentation appears normal, affect normal/bright, judgement and insight intact, normal speech and appearance well-groomed.       Video-Visit Details    Video Start Time: 11:14 AM with doximity, later on changed to César at 11:17 am     Type of service:  Video Visit    Video End Time:11:28 AM    Originating Location (pt. Location): Home    Distant Location (provider location):  [facility]     Platform used for Video Visit: César

## 2022-09-10 ENCOUNTER — HEALTH MAINTENANCE LETTER (OUTPATIENT)
Age: 47
End: 2022-09-10

## 2022-11-16 ENCOUNTER — TELEPHONE (OUTPATIENT)
Dept: EMERGENCY MEDICINE | Facility: CLINIC | Age: 47
End: 2022-11-16

## 2022-11-16 ENCOUNTER — HOSPITAL ENCOUNTER (EMERGENCY)
Facility: CLINIC | Age: 47
Discharge: HOME | End: 2022-11-16
Attending: EMERGENCY MEDICINE | Admitting: EMERGENCY MEDICINE
Payer: COMMERCIAL

## 2022-11-16 VITALS
HEART RATE: 95 BPM | SYSTOLIC BLOOD PRESSURE: 95 MMHG | DIASTOLIC BLOOD PRESSURE: 68 MMHG | RESPIRATION RATE: 18 BRPM | TEMPERATURE: 97.9 F | OXYGEN SATURATION: 99 %

## 2022-11-16 DIAGNOSIS — J06.9 UPPER RESPIRATORY TRACT INFECTION, UNSPECIFIED TYPE: ICD-10-CM

## 2022-11-16 LAB
FLUAV RNA SPEC QL NAA+PROBE: POSITIVE
FLUBV RNA RESP QL NAA+PROBE: NEGATIVE
RSV RNA SPEC NAA+PROBE: NEGATIVE
SARS-COV-2 RNA RESP QL NAA+PROBE: NEGATIVE

## 2022-11-16 PROCEDURE — 87637 SARSCOV2&INF A&B&RSV AMP PRB: CPT

## 2022-11-16 PROCEDURE — 99283 EMERGENCY DEPT VISIT LOW MDM: CPT | Mod: CS

## 2022-11-16 PROCEDURE — C9803 HOPD COVID-19 SPEC COLLECT: HCPCS

## 2022-11-16 RX ORDER — BENZONATATE 100 MG/1
100 CAPSULE ORAL 3 TIMES DAILY PRN
Qty: 15 CAPSULE | Refills: 0 | Status: SHIPPED | OUTPATIENT
Start: 2022-11-16 | End: 2022-11-21

## 2022-11-16 ASSESSMENT — ENCOUNTER SYMPTOMS
NECK STIFFNESS: 0
FEVER: 1
SHORTNESS OF BREATH: 0
DIARRHEA: 0
FATIGUE: 1
SORE THROAT: 0
VOMITING: 0
DYSURIA: 0
ABDOMINAL PAIN: 1
NAUSEA: 0
ARTHRALGIAS: 1

## 2022-11-16 ASSESSMENT — ACTIVITIES OF DAILY LIVING (ADL): ADLS_ACUITY_SCORE: 33

## 2022-11-16 NOTE — ED PROVIDER NOTES
"  History   Chief Complaint:  Cold Symptoms       HPI   Katie Wilson is a 47 year old female who presents with cold symptoms.    Review of Systems  ***    Allergies:  No known drug allergies    Medications:  Lexapro    Past Medical History:     Thyroid disorder  HSV infection  Major depressive disorder  Generalized anxiety disorder  Ovarian cyst  Cystocele with small rectocele and uterine descent     Past Surgical History:     section x2     Family History:    Mother- hypertension, cervical cancer  Father- cerebrovascular disease    Social History:  The patient presents to the ED with daughter  PCP: Carmen Atkins     Physical Exam     Patient Vitals for the past 24 hrs:   BP Temp Temp src Pulse Resp SpO2   22 1319 95/68 -- -- 95 -- 99 %   22 1318 -- -- -- 78 18 --   22 1317 -- 97.9  F (36.6  C) Temporal -- -- --       Physical Exam  ***    Emergency Department Course   ECG  No results found for this or any previous visit.    Imaging:  No orders to display     Report per {read:329741}    Laboratory:  Labs Ordered and Resulted from Time of ED Arrival to Time of ED Departure - No data to display     Procedures  ***    Emergency Department Course:    Reviewed:  I reviewed nursing notes, vitals, past medical history and Care Everywhere    Assessments:  *** I obtained history and examined the patient as noted above.   *** I rechecked the patient*** and explained findings***.   ***    Consults:  ***    Interventions:  ***    Disposition:  {EPPAFV Dispo:331232}    Impression & Plan     CMS Diagnoses: {Sepsis/Septic Shock/Stemi/Stroke:915809::\"None\"}  {FVTrauma:515886}    {[facility] Quality Projects:052029}      Medical Decision Making:  ***     Critical Care Time: was *** minutes for this patient excluding procedures    Diagnosis:  No diagnosis found.    Discharge Medications:  New Prescriptions    No medications on file       Scribe Disclosure:  INidia, am serving as " a scribe at 3:10 PM on 11/16/2022 to document services personally performed by Louis Paredes MD based on my observations and the provider's statements to me.

## 2022-11-16 NOTE — ED PROVIDER NOTES
History     Chief Complaint:  Cold Symptoms      HPI   Katie Wilson is a 47 year old female who presents with cough, subjective fever, body aches, congestion. States she was ill two weeks ago, got better and then developed cold symptoms again three days ago. Everyone in her family is sick at home and she is here with her 8yo daughter. Denies SOB. No GI symptoms. Some chest discomfort only when coughing a lot. Would like sometime for the cough.    Review of Systems   Constitutional: Positive for fatigue and fever.   HENT: Positive for congestion. Negative for ear pain and sore throat.    Respiratory: Negative for shortness of breath.    Cardiovascular: Negative for chest pain.   Gastrointestinal: Positive for abdominal pain. Negative for diarrhea, nausea and vomiting.   Genitourinary: Negative for dysuria.   Musculoskeletal: Positive for arthralgias. Negative for neck stiffness.   Skin: Negative for rash.   All other systems reviewed and are negative.      Allergies:  No Known Allergies      Medications:    escitalopram (LEXAPRO) 10 MG tablet  multivitamin w/minerals (THERA-VIT-M) tablet  Oxyquinoline-Sod Lauryl Sulf 0.025-0.01 % GEL        Past Medical History:    Past Medical History:   Diagnosis Date     Abnormal Pap smear      Herpes simplex without mention of complication      Left breast lump      Lump of right breast      Thyroid disorder      Patient Active Problem List    Diagnosis Date Noted     Cystocele with small rectocele and uterine descent 2020     Priority: Medium     Mixed stress and urge urinary incontinence 2020     Priority: Medium     Complex cyst of right ovary 2019     Priority: Medium     Moderate episode of recurrent major depressive disorder (H) 2018     Priority: Medium     ELVIN (generalized anxiety disorder) 2018     Priority: Medium     S/P  2017     Priority: Medium     Thyroid disorder      Priority: Medium     as a child unknown if  hyper or hypo       Herpes simplex virus (HSV) infection      Priority: Medium     Lump of right breast 2015     Priority: Medium        Past Surgical History:    Past Surgical History:   Procedure Laterality Date      SECTION N/A 2015    Procedure:  SECTION;  Surgeon: Yohana Monroy MD;  Location: [facility] L+D      SECTION N/A 1/3/2017    Procedure:  SECTION;  Surgeon: Jerzy Camacho MD;  Location: [facility] L+D       Family History:    Family History   Problem Relation Age of Onset     Hypertension Mother      Cervical Cancer Mother      Cerebrovascular Disease Father      Breast Cancer No family hx of      Ovarian Cancer No family hx of        Social History:  Carmen Atkins  The patient presents to the ED with her younger daughter    Physical Exam     Patient Vitals for the past 24 hrs:   BP Temp Temp src Pulse Resp SpO2   22 1319 95/68 -- -- 95 -- 99 %   22 1318 -- -- -- 78 18 --   22 1317 -- 97.9  F (36.6  C) Temporal -- -- --       Physical Exam  General: Nontoxic appearing middle aged female.  HENT: Patient wearing face mask. When taken off, mucous membranes appear moist.No posterior oropharynx erythema, tonsillar exudate or swelling. Uvula midline. No stridor.  Eyes: Conjunctive and sclera clear.  CV: Regular rate and rhythm. Normal S1, S2. No appreciable murmurs, gallops or rubs.  Resp: Lungs clear to auscultation bilaterally. Normal respiratory effort. Speaks in full sentences. No stridor or cough observed.  GI: Abdomen soft, non distended and nontender. No rebound or guarding.  MSK: Moves all extremities without difficulty.   Skin: Warm and dry.No rashes.  Neuro: Awake, alert, oriented.   Psych: Cooperative. Normal affect.      Emergency Department Course     Laboratory:  COVID/FLU/RSV swabs collected and pending    Emergency Department Course:  Reviewed:  I reviewed nursing notes, vitals and past medical  history    Assessments:  1520 I obtained history and examined the patient as noted above.     Interventions:  Medications - No data to display    Disposition:  The patient was discharged to home.     Impression & Plan      Medical Decision Making:  Katie Wilson is a 47 year old female who presents for evaluation of cough, subjective fever, body aches per HPI above.  This is consistent with an upper respiratory tract infection.  There is no signs at this point of serious bacterial infection such as OM, RPA, epiglottitis, PTA, strep pharyngitis, pneumonia, sinusitis, meningitis, bacteremia, serious bacterial infection.  Given clear lungs, fever curve, no hypoxia and no respiratory distress I do not feel she needs a CXR at this point as the probability of bacterial pneumonia is very unlikely.   There are no gastrointestinal symptoms at this point and no signs of dehydration.  Close followup with primary care physician is indicated.  Return to ED for fever > 103, protracted vomiting, confusion.  Not in the window for tamiflu is she tests positive for flu. COVID/Flu/RSV swabs pending. Prescribed Tessalon for help with cough.    Diagnosis:    ICD-10-CM    1. Upper respiratory tract infection, unspecified type  J06.9           Discharge Medications:  New Prescriptions    BENZONATATE (TESSALON) 100 MG CAPSULE    Take 1 capsule (100 mg) by mouth 3 times daily as needed for cough       Tanya Solano PA-C, PA-C  11/16/22 9714

## 2022-11-16 NOTE — DISCHARGE INSTRUCTIONS

## 2022-11-16 NOTE — TELEPHONE ENCOUNTER
[facility]:    [place] ED lab result protocol used  Respiratory Viral Illnesses    Reason for call  Notify of lab results, assess symptoms,  review ED providers recommendations/discharge instructions (if necessary) and advise per ED lab result f/u protocol    Lab Result   Influenza A/B, RSV & SARS-COV2 (Covid-19) virus PCR mulitplex is positive for Influenza A   Covid19 result is negative.  Patient will receive the Covid19 result via [employer] and a letter will be sent via [employer] (if active) or via the mail   Patient to be notified of Positive Influenza result and advised per [facility] Respiratory Virus Panel or Influenza A/B antigen protocol.  Information table from Emergency Dept Provider visit on 11/16/22  Symptoms reported at ED visit (Chief complaint, HPI) Cold Symptoms        HPI   Katie Wilson is a 47 year old female who presents with cough, subjective fever, body aches, congestion. States she was ill two weeks ago, got better and then developed cold symptoms again three days ago. Everyone in her family is sick at home and she is here with her 8yo daughter. Denies SOB. No GI symptoms. Some chest discomfort only when coughing a lot. Would like sometime for the cough.   ED providers Impression and Plan (applicable information) Katie Wilson is a 47 year old female who presents for evaluation of cough, subjective fever, body aches per HPI above.  This is consistent with an upper respiratory tract infection.  There is no signs at this point of serious bacterial infection such as OM, RPA, epiglottitis, PTA, strep pharyngitis, pneumonia, sinusitis, meningitis, bacteremia, serious bacterial infection.  Given clear lungs, fever curve, no hypoxia and no respiratory distress I do not feel she needs a CXR at this point as the probability of bacterial pneumonia is very unlikely.   There are no gastrointestinal symptoms at this point and no  signs of dehydration.  Close followup with primary care physician is indicated.  Return to ED for fever > 103, protracted vomiting, confusion.  Not in the window for tamiflu is she tests positive for flu. COVID/Flu/RSV swabs pending. Prescribed Tessalon for help with cough.      Miscellaneous information na     RN Assessment (Patient s current Symptoms), include time called.  [Insert Left message here if message left]  4:54PM: Unable to leave voicemail message requesting a call back to [facility] ED Lab Result RN at [phone].  RN is available every day between 9 a.m. and 5:30 p.m.    Sent Personalishart information on Influenza A    Angela Madden RN  [facility]  Emergency Dept Lab Result RN  Ph# [phone]     Copy of Lab result   Symptomatic; Yes; 11/2/2022 Influenza A/B & SARS-CoV2 (COVID-19) Virus PCR Multiplex Nasopharyngeal  Order: [id number]   Status: Final result      Visible to patient: Yes (not seen)     Specimen Information: Nasopharyngeal; Swab    1 Result Note    Component Ref Range & Units  3:27 PM 6 mo ago 1 yr ago    Influenza A PCR Negative Positive Abnormal        Influenza B PCR Negative Negative       RSV PCR Negative Negative       SARS CoV2 PCR Negative Negative  Negative R, CM  Negative CM    Comment: NEGATIVE: SARS-CoV-2 (COVID-19) RNA not detected, presumed negative.   Resulting Agency  [facility]           Narrative  Performed by: [facility]  Testing was performed using the Xpert Xpress CoV2/Flu/RSV Assay on the [employer] GeneXpert Instrument. This test should be ordered for the detection of SARS-CoV-2 and influenza viruses in individuals who meet clinical and/or epidemiological criteria. Test performance is unknown in asymptomatic patients. This test is for in vitro diagnostic use under the FDA EUA for laboratories certified under CLIA to perform high or moderate complexity testing. This test has not been FDA cleared or approved. A negative result  does not rule out the presence of PCR inhibitors in the specimen or target RNA in concentration below the limit of detection for the assay. If only one viral target is positive but coinfection with multiple targets is suspected, the sample should be re-tested with another FDA cleared, approved, or authorized test, if coinfection would change clinical management. This test was validated by the [facility]. These laboratories are certified under the Clinical Laboratory Improvement Amendments of 1988 (CLIA-88) as qualified to perform high complexity laboratory testing.      Specimen Collected: 11/16/22  3:27 PM Last Resulted: 11/16/22  4:45 PM

## 2022-12-19 ENCOUNTER — IMMUNIZATION (OUTPATIENT)
Dept: FAMILY MEDICINE | Facility: CLINIC | Age: 47
End: 2022-12-19
Payer: COMMERCIAL

## 2022-12-19 DIAGNOSIS — Z23 NEED FOR INFLUENZA VACCINATION: Primary | ICD-10-CM

## 2022-12-19 DIAGNOSIS — Z23 NEED FOR HEPATITIS B VACCINATION: ICD-10-CM

## 2022-12-19 PROCEDURE — 90746 HEPB VACCINE 3 DOSE ADULT IM: CPT

## 2022-12-19 PROCEDURE — 99207 PR NO CHARGE NURSE ONLY: CPT

## 2022-12-19 PROCEDURE — 90471 IMMUNIZATION ADMIN: CPT

## 2022-12-19 PROCEDURE — 90686 IIV4 VACC NO PRSV 0.5 ML IM: CPT

## 2022-12-19 PROCEDURE — 90472 IMMUNIZATION ADMIN EACH ADD: CPT

## 2022-12-19 NOTE — PROGRESS NOTES
Prior to immunization administration, verified patients identity using patient s name and date of birth. Please see Immunization Activity for additional information.     Screening Questionnaire for Adult Immunization    Are you sick today?   No   Do you have allergies to medications, food, a vaccine component or latex?   No   Have you ever had a serious reaction after receiving a vaccination?   No   Do you have a long-term health problem with heart, lung, kidney, or metabolic disease (e.g., diabetes), asthma, a blood disorder, no spleen, complement component deficiency, a cochlear implant, or a spinal fluid leak?  Are you on long-term aspirin therapy?   No   Do you have cancer, leukemia, HIV/AIDS, or any other immune system problem?   No   Do you have a parent, brother, or sister with an immune system problem?   No   In the past 3 months, have you taken medications that affect  your immune system, such as prednisone, other steroids, or anticancer drugs; drugs for the treatment of rheumatoid arthritis, Crohn s disease, or psoriasis; or have you had radiation treatments?   No   Have you had a seizure, or a brain or other nervous system problem?   No   During the past year, have you received a transfusion of blood or blood    products, or been given immune (gamma) globulin or antiviral drug?   No   For women: Are you pregnant or is there a chance you could become       pregnant during the next month?   No   Have you received any vaccinations in the past 4 weeks?   No     Immunization questionnaire answers were all negative.        injection of Hep b given by Lniette Rust. Patient instructed to remain in clinic for 15 minutes afterwards, and to report any adverse reaction to me immediately.       Screening performed by Linette Rust on 12/19/2022 at 2:42 PM.

## 2023-01-18 ENCOUNTER — ALLIED HEALTH/NURSE VISIT (OUTPATIENT)
Dept: FAMILY MEDICINE | Facility: CLINIC | Age: 48
End: 2023-01-18
Payer: COMMERCIAL

## 2023-01-18 DIAGNOSIS — Z23 NEED FOR HEPATITIS B VACCINATION: Primary | ICD-10-CM

## 2023-01-18 PROCEDURE — 90746 HEPB VACCINE 3 DOSE ADULT IM: CPT

## 2023-01-18 PROCEDURE — 99207 PR NO CHARGE NURSE ONLY: CPT

## 2023-01-18 PROCEDURE — 90471 IMMUNIZATION ADMIN: CPT

## 2023-01-23 ENCOUNTER — OFFICE VISIT (OUTPATIENT)
Dept: OBGYN | Facility: CLINIC | Age: 48
End: 2023-01-23
Payer: COMMERCIAL

## 2023-01-23 VITALS
DIASTOLIC BLOOD PRESSURE: 70 MMHG | HEIGHT: 59 IN | BODY MASS INDEX: 26.21 KG/M2 | SYSTOLIC BLOOD PRESSURE: 104 MMHG | WEIGHT: 130 LBS

## 2023-01-23 DIAGNOSIS — Z12.4 SCREENING FOR CERVICAL CANCER: ICD-10-CM

## 2023-01-23 DIAGNOSIS — N32.81 URGENCY-FREQUENCY SYNDROME: Primary | ICD-10-CM

## 2023-01-23 DIAGNOSIS — N89.8 VAGINAL DISCHARGE: ICD-10-CM

## 2023-01-23 LAB
CLUE CELLS: ABNORMAL
TRICHOMONAS, WET PREP: ABNORMAL
WBC'S/HIGH POWER FIELD, WET PREP: ABNORMAL
YEAST, WET PREP: ABNORMAL

## 2023-01-23 PROCEDURE — G0145 SCR C/V CYTO,THINLAYER,RESCR: HCPCS | Performed by: OBSTETRICS & GYNECOLOGY

## 2023-01-23 PROCEDURE — 87210 SMEAR WET MOUNT SALINE/INK: CPT | Performed by: OBSTETRICS & GYNECOLOGY

## 2023-01-23 PROCEDURE — 87624 HPV HI-RISK TYP POOLED RSLT: CPT | Performed by: OBSTETRICS & GYNECOLOGY

## 2023-01-23 PROCEDURE — 99213 OFFICE O/P EST LOW 20 MIN: CPT | Performed by: OBSTETRICS & GYNECOLOGY

## 2023-01-23 RX ORDER — SOLIFENACIN SUCCINATE 10 MG/1
10 TABLET, FILM COATED ORAL DAILY
Qty: 45 TABLET | Refills: 1 | Status: SHIPPED | OUTPATIENT
Start: 2023-01-23 | End: 2023-06-05

## 2023-01-23 NOTE — NURSING NOTE
"Chief Complaint   Patient presents with     Pessary Check/Fit/Insert     Pap smear also       Initial /70 (BP Location: Right arm, Patient Position: Chair, Cuff Size: Adult Regular)   Ht 1.499 m (4' 11\")   Wt 59 kg (130 lb)   LMP 2022 (Approximate)   Breastfeeding No   BMI 26.26 kg/m   Estimated body mass index is 26.26 kg/m  as calculated from the following:    Height as of this encounter: 1.499 m (4' 11\").    Weight as of this encounter: 59 kg (130 lb).  BP completed using cuff size: regular    Questioned patient about current smoking habits.  Pt. has never smoked.          The following HM Due: pap smear  MIRZA Oquendo CMA    "

## 2023-01-24 NOTE — PATIENT INSTRUCTIONS
You can reach your [facility] Care Team any time of the day by calling [phone]. This number will put you in touch with the 24 hour nurse line if the clinic is closed.    To contact your OB/GYN Station Coordinator/Surgery Scheduler please call [phone]. This is a direct number for your care team between 8 a.m. and 4 p.m. Monday through Friday.    [facility] is open for your convenience:  Monday through Friday 8 a.m. to 6 p.m.  Closed weekends and all major holidays.

## 2023-01-24 NOTE — PROGRESS NOTES
HPI:  Katie Wilson is a 47 year old female  Patient's last menstrual period was 2022 (approximate).  Condoms for contraception, who presents for follow-up of the previous office visits from 10/8/2020, 10/22/2020, 2020, 2021, 2021, 2021, 2021, and 2022 when she was seen for evaluation of uterine prolapse and mixed urinary incontinence.  She felt that her symptoms have become more prominent since the birth of her last child.  The patient underwent a urodynamic study on 10/8/2020 showing evidence of pelvic floor relaxation with a symptomatic uterine prolapse cystocele and hypermobility of the urethrovesical angle with subsequent urinary stress incontinence with overactive bladder urgency frequency and urge incontinence.  We have discussed with the patient on multiple occasions treatment alternatives and she is adamant at this time that she does not want surgery as she has no one to care for her children during her postop recovery period.  Instead the patient is opted for #4 diaphragm pessary which she uses with good results.  The patient places it in the morning and removes it at night.  She uses it with Trimo-Zamora gel.  The patient had also been given a trial of Detrol 4 mg daily for her urgency frequency.  Her insurance would not pay for it and the prescription was prohibitively expensive for her.  We discussed alternative antimuscarinic agents that could be used and she is amenable to trying those to see if her insurance will cover it.  At present she like to continue with a diaphragm pessary.  Patient would also like a Pap smear done today    Past Medical History:   Diagnosis Date     Abnormal Pap smear      Herpes simplex without mention of complication      Left breast lump      Lump of right breast      Thyroid disorder     as a child unknown if hyper or hypo     Current Outpatient Medications   Medication     escitalopram (LEXAPRO) 10 MG tablet     multivitamin  "w/minerals (THERA-VIT-M) tablet     Oxyquinoline-Sod Lauryl Sulf 0.025-0.01 % GEL     sertraline (ZOLOFT) 50 MG tablet     solifenacin (VESICARE) 10 MG tablet     No current facility-administered medications for this visit.     /70 (BP Location: Right arm, Patient Position: Chair, Cuff Size: Adult Regular)   Ht 1.499 m (4' 11\")   Wt 59 kg (130 lb)   LMP 2022 (Approximate)   Breastfeeding No   BMI 26.26 kg/m    {    Past Surgical History:   Procedure Laterality Date      SECTION N/A 2015    Procedure:  SECTION;  Surgeon: Yohana Monroy MD;  Location: [facility]      SECTION N/A 1/3/2017    Procedure:  SECTION;  Surgeon: Jerzy Camacho MD;  Location: [facility]     Family History   Problem Relation Age of Onset     Hypertension Mother      Cervical Cancer Mother      Cerebrovascular Disease Father      Breast Cancer No family hx of      Ovarian Cancer No family hx of      Social History     Socioeconomic History     Marital status:      Spouse name: Not on file     Number of children: Not on file     Years of education: Not on file     Highest education level: Not on file   Occupational History     Not on file   Tobacco Use     Smoking status: Never     Smokeless tobacco: Never   Vaping Use     Vaping Use: Never used   Substance and Sexual Activity     Alcohol use: No     Alcohol/week: 0.0 standard drinks     Drug use: No     Sexual activity: Yes     Partners: Male     Birth control/protection: Condom   Other Topics Concern     Parent/sibling w/ CABG, MI or angioplasty before 65F 55M? No   Social History Narrative    Caffeine intake/servings daily - 1    Calcium intake/servings daily - 3    Exercise 4 times weekly - describe walks    Sunscreen used - Yes    Seatbelts used - Yes    Guns stored in the home - No    Self Breast Exam - No    Pap test up to date -  No    Eye exam up to date -  No    Dental exam up to date -  No    DEXA scan up to date -  No    " "Flex Sig/Colonoscopy up to date -  No    Mammography up to date -  No    Immunizations reviewed and up to date - Yes    Abuse: Current or Past (Physical, Sexual or Emotional) - No    Do you feel safe in your environment - Yes    Do you cope well with stress - Yes    Do you suffer from insomnia - No    Last updated by: Viktoira Houser  5/30/2014         Social Determinants of Health     Financial Resource Strain: Not on file   Food Insecurity: Not on file   Transportation Needs: Not on file   Physical Activity: Not on file   Stress: Not on file   Social Connections: Not on file   Intimate Partner Violence: Not on file   Housing Stability: Not on file       Allergies:  Patient has no known allergies.    Current Outpatient Medications   Medication Sig Dispense Refill     escitalopram (LEXAPRO) 10 MG tablet Take 1 tablet (10 mg) by mouth daily 90 tablet 0     multivitamin w/minerals (THERA-VIT-M) tablet Take 1 tablet by mouth daily       Oxyquinoline-Sod Lauryl Sulf 0.025-0.01 % GEL Place 1 applicator vaginally daily With pessary change as directed 113.4 g 3     sertraline (ZOLOFT) 50 MG tablet Take 50 mg by mouth daily       solifenacin (VESICARE) 10 MG tablet Take 1 tablet (10 mg) by mouth daily 45 tablet 1       Review Of Systems   ROS: 10 point ROS neg other than the symptoms noted above in the HPI.    Exam:  /70 (BP Location: Right arm, Patient Position: Chair, Cuff Size: Adult Regular)   Ht 1.499 m (4' 11\")   Wt 59 kg (130 lb)   LMP 12/22/2022 (Approximate)   Breastfeeding No   BMI 26.26 kg/m    {Constitutional: healthy, alert and no distress  Cardiovascular: negative, PMI normal. No lifts, heaves, or thrills. RRR. No murmurs, clicks gallops or rub  Respiratory: negative, Percussion normal. Good diaphragmatic excursion. Lungs clear  Gastrointestinal: Abdomen soft, non-tender. BS normal. No masses, organomegaly  Genitourinary: Normal external genitalia without lesions and the number for " diaphragm pessary was removed and cleaned with soap and water.  Speculum exam reveals a multiparous appearing cervix no lesions seen and on ectocervical Pap were done.  Bimanual exam the uterus is parous mobile smooth firm adnexa without masses enlargement or tenderness.  The cervix prolapses to the introitus and there is a cystocele anteriorly that prolapses to the introitus.  Rectovaginal exam reveals a grade 1 rectocele distally.  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  We discussed fluid management and elimination of bladder irritants.  I like her to keep a voiding diary and I will see her back in 1 month for follow-up.  A detailed written plan was given    (N89.8) Vaginal discharge  Comment: On further questioning she was complaining of some intermittent vaginal discharge.  A wet prep was done today and was negative.  I suspect it may have been some of the Trimo-Zamora gel causing this discharge.  Plan: Wet preparation, CANCELED: Wet preparation        Remedial measures were discussed    (Z12.4) Screening for cervical cancer  Comment: Patient requested Pap recommendations reviewed  Plan: Pap imaged thin layer screen with HPV -         recommended age 30 - 65, CANCELED: Pap imaged         thin layer screen with HPV - recommended age 30        - 65        done      Douglas Sevilla M.D.

## 2023-01-27 LAB
BKR LAB AP GYN ADEQUACY: NORMAL
BKR LAB AP GYN INTERPRETATION: NORMAL
BKR LAB AP HPV REFLEX: NORMAL
BKR LAB AP LMP: NORMAL
BKR LAB AP PREVIOUS ABNORMAL: NORMAL
PATH REPORT.COMMENTS IMP SPEC: NORMAL
PATH REPORT.COMMENTS IMP SPEC: NORMAL
PATH REPORT.RELEVANT HX SPEC: NORMAL

## 2023-01-30 LAB
HUMAN PAPILLOMA VIRUS 16 DNA: NEGATIVE
HUMAN PAPILLOMA VIRUS 18 DNA: NEGATIVE
HUMAN PAPILLOMA VIRUS FINAL DIAGNOSIS: NORMAL
HUMAN PAPILLOMA VIRUS OTHER HR: NEGATIVE

## 2023-02-03 ENCOUNTER — PATIENT OUTREACH (OUTPATIENT)
Dept: OBGYN | Facility: CLINIC | Age: 48
End: 2023-02-03
Payer: COMMERCIAL

## 2023-02-06 ENCOUNTER — OFFICE VISIT (OUTPATIENT)
Dept: INTERNAL MEDICINE | Facility: CLINIC | Age: 48
End: 2023-02-06
Payer: COMMERCIAL

## 2023-02-06 VITALS
TEMPERATURE: 96.8 F | HEIGHT: 59 IN | OXYGEN SATURATION: 100 % | WEIGHT: 134 LBS | HEART RATE: 78 BPM | BODY MASS INDEX: 27.01 KG/M2 | SYSTOLIC BLOOD PRESSURE: 112 MMHG | RESPIRATION RATE: 17 BRPM | DIASTOLIC BLOOD PRESSURE: 72 MMHG

## 2023-02-06 DIAGNOSIS — Z00.00 ROUTINE HISTORY AND PHYSICAL EXAMINATION OF ADULT: Primary | ICD-10-CM

## 2023-02-06 DIAGNOSIS — Z12.11 SCREEN FOR COLON CANCER: ICD-10-CM

## 2023-02-06 DIAGNOSIS — F33.1 MODERATE EPISODE OF RECURRENT MAJOR DEPRESSIVE DISORDER (H): ICD-10-CM

## 2023-02-06 LAB
ERYTHROCYTE [DISTWIDTH] IN BLOOD BY AUTOMATED COUNT: 12.9 % (ref 10–15)
HCT VFR BLD AUTO: 32.5 % (ref 35–47)
HGB BLD-MCNC: 11.1 G/DL (ref 11.7–15.7)
MCH RBC QN AUTO: 26.7 PG (ref 26.5–33)
MCHC RBC AUTO-ENTMCNC: 34.2 G/DL (ref 31.5–36.5)
MCV RBC AUTO: 78 FL (ref 78–100)
PLATELET # BLD AUTO: 364 10E3/UL (ref 150–450)
RBC # BLD AUTO: 4.15 10E6/UL (ref 3.8–5.2)
WBC # BLD AUTO: 6.7 10E3/UL (ref 4–11)

## 2023-02-06 PROCEDURE — 36415 COLL VENOUS BLD VENIPUNCTURE: CPT | Performed by: INTERNAL MEDICINE

## 2023-02-06 PROCEDURE — 85027 COMPLETE CBC AUTOMATED: CPT | Performed by: INTERNAL MEDICINE

## 2023-02-06 PROCEDURE — 80053 COMPREHEN METABOLIC PANEL: CPT | Performed by: INTERNAL MEDICINE

## 2023-02-06 PROCEDURE — 80061 LIPID PANEL: CPT | Performed by: INTERNAL MEDICINE

## 2023-02-06 PROCEDURE — 99396 PREV VISIT EST AGE 40-64: CPT | Performed by: INTERNAL MEDICINE

## 2023-02-06 PROCEDURE — 84443 ASSAY THYROID STIM HORMONE: CPT | Performed by: INTERNAL MEDICINE

## 2023-02-06 RX ORDER — ESCITALOPRAM OXALATE 10 MG/1
10 TABLET ORAL DAILY
Qty: 90 TABLET | Refills: 1 | Status: SHIPPED | OUTPATIENT
Start: 2023-02-06

## 2023-02-06 ASSESSMENT — ENCOUNTER SYMPTOMS
COUGH: 0
DIZZINESS: 0
CHILLS: 0
MYALGIAS: 0
DIARRHEA: 0
HEADACHES: 0
HEMATURIA: 0
CONSTIPATION: 0
ARTHRALGIAS: 0
HEMATOCHEZIA: 0
PALPITATIONS: 0
PARESTHESIAS: 0
DYSURIA: 0
SORE THROAT: 0
WEAKNESS: 0
ABDOMINAL PAIN: 0
FEVER: 0
HEARTBURN: 0
NAUSEA: 0
SHORTNESS OF BREATH: 0
BREAST MASS: 0
EYE PAIN: 0
FREQUENCY: 0
NERVOUS/ANXIOUS: 0
JOINT SWELLING: 0

## 2023-02-06 ASSESSMENT — PATIENT HEALTH QUESTIONNAIRE - PHQ9: SUM OF ALL RESPONSES TO PHQ QUESTIONS 1-9: 3

## 2023-02-06 NOTE — NURSING NOTE
"/72   Pulse 78   Temp 96.8  F (36  C) (Tympanic)   Resp 17   Ht 1.499 m (4' 11\")   Wt 60.8 kg (134 lb)   LMP 01/23/2023 (Approximate)   SpO2 100%   BMI 27.06 kg/m      "

## 2023-02-06 NOTE — PROGRESS NOTES
SUBJECTIVE:   CC: Katie is an 47 year old who presents for preventive health visit.     Patient has been advised of split billing requirements and indicates understanding: Yes  Healthy Habits:     Getting at least 3 servings of Calcium per day:  Yes    Bi-annual eye exam:  Yes    Dental care twice a year:  Yes    Sleep apnea or symptoms of sleep apnea:  None    Diet:  Regular (no restrictions)    Frequency of exercise:  None    Taking medications regularly:  Yes    Medication side effects:  None    PHQ-2 Total Score: 2    Additional concerns today:  No      Depression and Anxiety Follow-Up    How are you doing with your depression since your last visit? No change    How are you doing with your anxiety since your last visit?  No change    Are you having other symptoms that might be associated with depression or anxiety? No    Have you had a significant life event? No     Do you have any concerns with your use of alcohol or other drugs? No    PHQ 2022   PHQ-9 Total Score 14 4 3   Q9: Thoughts of better off dead/self-harm past 2 weeks Not at all Not at all Not at all   F/U: Thoughts of suicide or self-harm No - -   F/U: Safety concerns No - -         Today's PHQ-2 Score:   PHQ-2 (  [employer]) 2023   Q1: Little interest or pleasure in doing things 2   Q2: Feeling down, depressed or hopeless 0   PHQ-2 Score 2   PHQ-2 Total Score (12-17 Years)- Positive if 3 or more points; Administer PHQ-A if positive -   Q1: Little interest or pleasure in doing things More than half the days   Q2: Feeling down, depressed or hopeless Not at all   PHQ-2 Score 2       Past Medical History:   Diagnosis Date     Abnormal Pap smear      Herpes simplex without mention of complication      Left breast lump      Lump of right breast      Thyroid disorder     as a child unknown if hyper or hypo       Past Surgical History:   Procedure Laterality Date      SECTION N/A 2015    Procedure:  SECTION;   Surgeon: Yohana Monroy MD;  [facility]      SECTION N/A 1/3/2017    Procedure:  SECTION;  Surgeon: Jerzy Camacho MD;  Location: [facility]       Current Outpatient Medications   Medication Sig Dispense Refill     escitalopram (LEXAPRO) 10 MG tablet Take 1 tablet (10 mg) by mouth daily 90 tablet 1     multivitamin w/minerals (THERA-VIT-M) tablet Take 1 tablet by mouth daily       Oxyquinoline-Sod Lauryl Sulf 0.025-0.01 % GEL Place 1 applicator vaginally daily With pessary change as directed 113.4 g 3     solifenacin (VESICARE) 10 MG tablet Take 1 tablet (10 mg) by mouth daily (Patient taking differently: Take 10 mg by mouth daily Through OBGYN) 45 tablet 1         Social History     Tobacco Use     Smoking status: Never     Smokeless tobacco: Never   Substance Use Topics     Alcohol use: No     Alcohol/week: 0.0 standard drinks     If you drink alcohol do you typically have >3 drinks per day or >7 drinks per week? No    Alcohol Use 2023   Prescreen: >3 drinks/day or >7 drinks/week? No   Prescreen: >3 drinks/day or >7 drinks/week? -   No flowsheet data found.    Reviewed orders with patient.  Reviewed health maintenance and updated orders accordingly - Yes  Lab work is in process    Breast Cancer Screening:    Breast CA Risk Assessment (FHS-7) 2022   Do you have a family history of breast, colon, or ovarian cancer? No / Unknown       Pertinent mammograms are reviewed under the imaging tab.    History of abnormal Pap smear: YES - updated in Problem List and Health Maintenance accordingly  PAP / HPV Latest Ref Rng & Units 2017   PAP   Negative for Intraepithelial Lesion or Malignancy (NILM) - -   PAP (Historical) - - NIL NIL   HPV16 Negative Negative Negative -   HPV18 Negative Negative Negative -   HRHPV Negative Negative Negative -     Reviewed and updated as needed this visit by clinical staff                  Reviewed and updated as needed this  "visit by Provider                     Review of Systems   Constitutional: Negative for chills and fever.   HENT: Negative for congestion, ear pain, hearing loss and sore throat.    Eyes: Negative for pain and visual disturbance.   Respiratory: Negative for cough and shortness of breath.    Cardiovascular: Negative for chest pain, palpitations and peripheral edema.   Gastrointestinal: Negative for abdominal pain, constipation, diarrhea, heartburn, hematochezia and nausea.   Breasts:  Negative for tenderness, breast mass and discharge.   Genitourinary: Negative for dysuria, frequency, genital sores, hematuria, pelvic pain, urgency, vaginal bleeding and vaginal discharge.   Musculoskeletal: Negative for arthralgias, joint swelling and myalgias.   Skin: Negative for rash.   Neurological: Negative for dizziness, weakness, headaches and paresthesias.   Psychiatric/Behavioral: Negative for mood changes. The patient is not nervous/anxious.         OBJECTIVE:   /72   Pulse 78   Temp 96.8  F (36  C) (Tympanic)   Resp 17   Ht 1.499 m (4' 11\")   Wt 60.8 kg (134 lb)   LMP 01/23/2023 (Approximate)   SpO2 100%   BMI 27.06 kg/m    Physical Exam  GENERAL: healthy, alert and no distress  EYES: Eyes grossly normal to inspection, PERRL and conjunctivae and sclerae normal  NECK: no adenopathy, no asymmetry, masses, or scars and thyroid normal to palpation  RESP: lungs clear to auscultation - no rales, rhonchi or wheezes  BREAST: normal without masses, tenderness or nipple discharge and no palpable axillary masses or adenopathy  CV: regular rate and rhythm, normal S1 S2,   ABDOMEN: soft, nontender, no hepatosplenomegaly, no masses and bowel sounds normal  MS: no gross musculoskeletal defects noted, no edema  NEURO: Normal strength and tone, mentation intact and speech normal  PSYCH: mentation appears normal, affect normal/bright    ASSESSMENT/PLAN:     (Z00.00) Routine history and physical examination of adult  (primary " "encounter diagnosis)  Plan: Comprehensive metabolic panel, Lipid panel         reflex to direct LDL Fasting, CBC with         platelets, TSH with free T4 reflex            (F33.1) Moderate episode of recurrent major depressive disorder (H)  Comment: stable  Plan: escitalopram (LEXAPRO) 10 MG tablet refilled as directed.explained clearly about the medication,insructions and side effects.    (Z12.11) Screen for colon cancer  Plan: Colonoscopy Screening  Referral            Patient has been advised of split billing requirements and indicates understanding: Yes      COUNSELING:  Reviewed preventive health counseling, as reflected in patient instructions       Regular exercise       Healthy diet/nutrition      BMI:   Estimated body mass index is 27.06 kg/m  as calculated from the following:    Height as of this encounter: 1.499 m (4' 11\").    Weight as of this encounter: 60.8 kg (134 lb).   Weight management plan: Discussed healthy diet and exercise guidelines      She reports that she has never smoked. She has never used smokeless tobacco.          Carmen Atkins MD  [facility]  " No

## 2023-02-07 LAB
ALBUMIN SERPL BCG-MCNC: 4.3 G/DL (ref 3.5–5.2)
ALP SERPL-CCNC: 92 U/L (ref 35–104)
ALT SERPL W P-5'-P-CCNC: 21 U/L (ref 10–35)
ANION GAP SERPL CALCULATED.3IONS-SCNC: 12 MMOL/L (ref 7–15)
AST SERPL W P-5'-P-CCNC: 30 U/L (ref 10–35)
BILIRUB SERPL-MCNC: 0.2 MG/DL
BUN SERPL-MCNC: 11.6 MG/DL (ref 6–20)
CALCIUM SERPL-MCNC: 9 MG/DL (ref 8.6–10)
CHLORIDE SERPL-SCNC: 103 MMOL/L (ref 98–107)
CHOLEST SERPL-MCNC: 198 MG/DL
CREAT SERPL-MCNC: 0.57 MG/DL (ref 0.51–0.95)
DEPRECATED HCO3 PLAS-SCNC: 25 MMOL/L (ref 22–29)
GFR SERPL CREATININE-BSD FRML MDRD: >90 ML/MIN/1.73M2
GLUCOSE SERPL-MCNC: 77 MG/DL (ref 70–99)
HDLC SERPL-MCNC: 49 MG/DL
LDLC SERPL CALC-MCNC: 125 MG/DL
NONHDLC SERPL-MCNC: 149 MG/DL
POTASSIUM SERPL-SCNC: 3.7 MMOL/L (ref 3.4–5.3)
PROT SERPL-MCNC: 7.9 G/DL (ref 6.4–8.3)
SODIUM SERPL-SCNC: 140 MMOL/L (ref 136–145)
TRIGL SERPL-MCNC: 118 MG/DL
TSH SERPL DL<=0.005 MIU/L-ACNC: 0.8 UIU/ML (ref 0.3–4.2)

## 2023-04-04 ENCOUNTER — OFFICE VISIT (OUTPATIENT)
Dept: URGENT CARE | Facility: URGENT CARE | Age: 48
End: 2023-04-04
Payer: COMMERCIAL

## 2023-04-04 VITALS
HEART RATE: 79 BPM | SYSTOLIC BLOOD PRESSURE: 104 MMHG | DIASTOLIC BLOOD PRESSURE: 60 MMHG | TEMPERATURE: 98.1 F | OXYGEN SATURATION: 99 %

## 2023-04-04 DIAGNOSIS — J06.9 VIRAL URI WITH COUGH: Primary | ICD-10-CM

## 2023-04-04 LAB
DEPRECATED S PYO AG THROAT QL EIA: NEGATIVE
GROUP A STREP BY PCR: ABNORMAL

## 2023-04-04 PROCEDURE — 99213 OFFICE O/P EST LOW 20 MIN: CPT | Performed by: PHYSICIAN ASSISTANT

## 2023-04-04 PROCEDURE — 87651 STREP A DNA AMP PROBE: CPT | Performed by: PHYSICIAN ASSISTANT

## 2023-04-04 RX ORDER — BENZONATATE 100 MG/1
100-200 CAPSULE ORAL 3 TIMES DAILY PRN
Qty: 30 CAPSULE | Refills: 0 | Status: SHIPPED | OUTPATIENT
Start: 2023-04-04 | End: 2023-10-10

## 2023-04-04 NOTE — PATIENT INSTRUCTIONS
Adult Self-Care for Colds  Colds are caused by viruses. They can t be cured with antibiotics. However, you can relieve symptoms and support your body s efforts to heal itself. No matter which symptoms you have, be sure to drink plenty of fluids (water or clear soup); stop smoking and drinking alcohol; and get plenty of rest.      Understand a fever  Take your temperature several times a day. If your fever is 100.4 F (38.0 C) for more than a day, call your doctor.  Relax, lie down. Go to bed if you want. Just get off your feet and rest. Also, drink plenty of fluids to avoid dehydration.  Take acetaminophen or a nonsteroidal anti-inflammatory agent (NSAID), such as ibuprofen.  Treat a troubled nose kindly  Breathe steam or heated humidified air to open blocked nasal passages.  a hot shower or use a vaporizer. Be careful not to get burned by the steam.  Saline nasal sprays and Mucinex help open a stuffy nose. Antihistamines can also help, but they can cause side effects such as drowsiness and drying of the eyes, nose, and mouth.  Soothe a sore throat and cough  Gargle every 2 hours with 1/4 teaspoon of salt dissolved in 1/2 cup of warm water. Suck on throat lozenges and cough drops to moisten your throat (those containing menthol work best).  Cough medicines are available but it is unclear how effective they actually are.  Try honey for cough  Take acetaminophen or an NSAID, such as ibuprofen to ease throat pain  Ease digestive problems  Put fluid back into your body. Take frequent sips of clear liquids such as water or broth. Do not drink beverages with a lot of sugar in them, such as juices and sodas. These can make diarrhea worse. Older children and adults can drink sports drinks.  As your appetite returns, you can resume your normal diet. Ask your doctor whether there are any foods you should avoid.  When to seek medical care  When you first notice symptoms, ask your health care provider if antiviral  medications are appropriate. Antibiotics should not be taken for colds or flu. Also, call your doctor if you have any of the following symptoms or if you aren t feeling better after 7 days:  Shortness of breath  Pain or pressure in the chest or abdomen  Worsening symptoms, especially after a period of improvement  Fever of 100.4 F  (38.0 C) or higher, or fever that doesn t go down with medication  Sudden dizziness or confusion  Severe or continued vomiting  Signs of dehydration, including extreme thirst, dark urine, infrequent urination, dry mouth  Spotted, red, or very sore throat      4696-2557 [employer]. [address] [place] [id number]. All rights reserved. This information is not intended as a substitute for professional medical care. Always follow your healthcare professional's instructions.

## 2023-04-04 NOTE — PROGRESS NOTES
Assessment/Plan:    No acute distress or toxicity noted. Strep negative. Lungs CTAB, no signs of pneumonia. Suspect viral illness. Supportive treatments discussed- continue use of over the counter treatments such as ibuprofen, acetaminophen, Tessalon as needed.    See patient instructions below.  At the end of the encounter, I discussed results, diagnosis, medications. Discussed red flags for immediate return to clinic/ER, as well as indications for follow up if no improvement. Patient understood and agreed to plan. Patient was stable for discharge.      ICD-10-CM    1. Viral URI with cough  J06.9 Streptococcus A Rapid Screen w/Reflex to PCR - Clinic Collect     Group A Streptococcus PCR Throat Swab     benzonatate (TESSALON) 100 MG capsule            Return in about 2 weeks (around 4/18/2023) for Follow up w/ primary care provider if not better.    LAINE cMkeon, [place]  [facility]    ------------------------------------------------------------------------------------------------------------------------------------------------------------------------  HPI:  Katie Wilson is a 47 year old female who presents for evaluation of dry cough onset 6 days ago. No treatments tried. Patient reports no fever/chills, myalgias, nasal congestion, sore throat, loss of sense of taste or smell, headache, chest pain, shortness of breath, abdominal pain, nausea, vomiting, diarrhea, rash, or any other symptoms. Her daughter has strep throat.      Past Medical History:   Diagnosis Date     Abnormal Pap smear      Herpes simplex without mention of complication      Left breast lump      Lump of right breast      Thyroid disorder     as a child unknown if hyper or hypo       Vitals:    04/04/23 1135   BP: 104/60   BP Location: Right arm   Pulse: 79   Temp: 98.1  F (36.7  C)   TempSrc: Tympanic   SpO2: 99%       Physical Exam  Vitals and nursing note reviewed.   HENT:      Mouth/Throat:      Mouth:  Mucous membranes are moist.      Pharynx: Uvula midline. Posterior oropharyngeal erythema present.      Tonsils: No tonsillar abscesses.   Cardiovascular:      Rate and Rhythm: Normal rate and regular rhythm.      Heart sounds: Normal heart sounds.   Pulmonary:      Effort: Pulmonary effort is normal.      Breath sounds: Normal breath sounds.   Neurological:      Mental Status: She is alert.         Labs/Imaging:  Results for orders placed or performed in visit on 04/04/23 (from the past 24 hour(s))   Streptococcus A Rapid Screen w/Reflex to PCR - Clinic Collect    Specimen: Throat; Swab   Result Value Ref Range    Group A Strep antigen Negative Negative     No results found for this or any previous visit (from the past 24 hour(s)).      Patient Instructions     Adult Self-Care for Colds  Colds are caused by viruses. They can t be cured with antibiotics. However, you can relieve symptoms and support your body s efforts to heal itself. No matter which symptoms you have, be sure to drink plenty of fluids (water or clear soup); stop smoking and drinking alcohol; and get plenty of rest.      Understand a fever    Take your temperature several times a day. If your fever is 100.4 F (38.0 C) for more than a day, call your doctor.    Relax, lie down. Go to bed if you want. Just get off your feet and rest. Also, drink plenty of fluids to avoid dehydration.    Take acetaminophen or a nonsteroidal anti-inflammatory agent (NSAID), such as ibuprofen.  Treat a troubled nose kindly    Breathe steam or heated humidified air to open blocked nasal passages.  a hot shower or use a vaporizer. Be careful not to get burned by the steam.    Saline nasal sprays and Mucinex help open a stuffy nose. Antihistamines can also help, but they can cause side effects such as drowsiness and drying of the eyes, nose, and mouth.  Soothe a sore throat and cough    Gargle every 2 hours with 1/4 teaspoon of salt dissolved in 1/2 cup of warm  water. Suck on throat lozenges and cough drops to moisten your throat (those containing menthol work best).    Cough medicines are available but it is unclear how effective they actually are.    Try honey for cough    Take acetaminophen or an NSAID, such as ibuprofen to ease throat pain  Ease digestive problems    Put fluid back into your body. Take frequent sips of clear liquids such as water or broth. Do not drink beverages with a lot of sugar in them, such as juices and sodas. These can make diarrhea worse. Older children and adults can drink sports drinks.    As your appetite returns, you can resume your normal diet. Ask your doctor whether there are any foods you should avoid.  When to seek medical care  When you first notice symptoms, ask your health care provider if antiviral medications are appropriate. Antibiotics should not be taken for colds or flu. Also, call your doctor if you have any of the following symptoms or if you aren t feeling better after 7 days:    Shortness of breath    Pain or pressure in the chest or abdomen    Worsening symptoms, especially after a period of improvement    Fever of 100.4 F  (38.0 C) or higher, or fever that doesn t go down with medication    Sudden dizziness or confusion    Severe or continued vomiting    Signs of dehydration, including extreme thirst, dark urine, infrequent urination, dry mouth    Spotted, red, or very sore throat      [id number] [employer]. [address] [place] [address], [place] [address]. All rights reserved. This information is not intended as a substitute for professional medical care. Always follow your healthcare professional's instructions.

## 2023-04-05 ENCOUNTER — HOSPITAL ENCOUNTER (EMERGENCY)
Facility: CLINIC | Age: 48
Discharge: HOME | End: 2023-04-05
Attending: EMERGENCY MEDICINE | Admitting: EMERGENCY MEDICINE
Payer: COMMERCIAL

## 2023-04-05 ENCOUNTER — APPOINTMENT (OUTPATIENT)
Dept: GENERAL RADIOLOGY | Facility: CLINIC | Age: 48
End: 2023-04-05
Attending: EMERGENCY MEDICINE
Payer: COMMERCIAL

## 2023-04-05 VITALS
WEIGHT: 131.84 LBS | SYSTOLIC BLOOD PRESSURE: 118 MMHG | HEART RATE: 82 BPM | DIASTOLIC BLOOD PRESSURE: 83 MMHG | OXYGEN SATURATION: 100 % | TEMPERATURE: 98.1 F | BODY MASS INDEX: 26.63 KG/M2 | RESPIRATION RATE: 16 BRPM

## 2023-04-05 DIAGNOSIS — J06.9 UPPER RESPIRATORY TRACT INFECTION, UNSPECIFIED TYPE: ICD-10-CM

## 2023-04-05 DIAGNOSIS — J02.0 STREPTOCOCCAL PHARYNGITIS: ICD-10-CM

## 2023-04-05 LAB
FLUAV RNA SPEC QL NAA+PROBE: NEGATIVE
FLUBV RNA RESP QL NAA+PROBE: NEGATIVE
GROUP A STREP BY PCR: DETECTED
RSV RNA SPEC NAA+PROBE: NEGATIVE
SARS-COV-2 RNA RESP QL NAA+PROBE: NEGATIVE

## 2023-04-05 PROCEDURE — 87637 SARSCOV2&INF A&B&RSV AMP PRB: CPT | Performed by: EMERGENCY MEDICINE

## 2023-04-05 PROCEDURE — 87651 STREP A DNA AMP PROBE: CPT | Performed by: STUDENT IN AN ORGANIZED HEALTH CARE EDUCATION/TRAINING PROGRAM

## 2023-04-05 PROCEDURE — 71046 X-RAY EXAM CHEST 2 VIEWS: CPT

## 2023-04-05 PROCEDURE — 99284 EMERGENCY DEPT VISIT MOD MDM: CPT | Mod: 25,CS

## 2023-04-05 PROCEDURE — C9803 HOPD COVID-19 SPEC COLLECT: HCPCS

## 2023-04-05 RX ORDER — AMOXICILLIN 500 MG/1
1000 CAPSULE ORAL 2 TIMES DAILY
Qty: 40 CAPSULE | Refills: 0 | Status: SHIPPED | OUTPATIENT
Start: 2023-04-05 | End: 2023-04-15

## 2023-04-05 RX ORDER — ALBUTEROL SULFATE 90 UG/1
2 AEROSOL, METERED RESPIRATORY (INHALATION) EVERY 6 HOURS PRN
Qty: 18 G | Refills: 0 | Status: SHIPPED | OUTPATIENT
Start: 2023-04-05 | End: 2023-10-10

## 2023-04-05 RX ORDER — PREDNISONE 20 MG/1
TABLET ORAL
Qty: 10 TABLET | Refills: 0 | Status: SHIPPED | OUTPATIENT
Start: 2023-04-05 | End: 2023-10-10

## 2023-04-05 ASSESSMENT — ENCOUNTER SYMPTOMS
ABDOMINAL PAIN: 1
FATIGUE: 1
HEADACHES: 1
VOMITING: 0
BACK PAIN: 1
DIARRHEA: 1
COUGH: 1

## 2023-04-05 ASSESSMENT — ACTIVITIES OF DAILY LIVING (ADL): ADLS_ACUITY_SCORE: 33

## 2023-04-05 NOTE — DISCHARGE INSTRUCTIONS
Medications were sent to pharmacy  Antibiotic for 10 full days  Prednisone for 5 days  Inhaler as needed every 6 hours for ongoing coughing and wheezing  Tylenol and Ibuprofen as needed for sore throat and other pain  Drink many fluids, sleep with humidifier  Discharge Instructions  Upper Respiratory Infection    The upper respiratory tract includes the sinuses, nasal passages, pharynx, and larynx. A URI, or upper respiratory infection, is an infection of any of the parts of the upper airway. Symptoms include runny nose, congestion, sore throat, cough, and fever. URIs are almost always caused by a virus. Antibiotics do not help with virus infections, so are not used for an ordinary URI. A URI is very contagious through coughing and nasal secretions; make sure you wash your hands often and clean surfaces after sneezing, coughing or touching them.  Viruses can live on surfaces for up to 3 days.      Return to the Emergency Department if:  Any of the symptoms you have get much worse.  You seem very sick, like being too weak to get up.  You have any new symptoms, especially serious things like chest pain.   You are short of breath.   You have a severe headache.  You are vomiting so much you can t keep fluids or medicines down.  You have confusion or seem unusually drowsy.  You have a seizure or convulsion.    Follow-up:    You should start to improve in 3 - 5 days.  A cough can linger for up to six weeks, but overall you should be feeling much better.  See your doctor if you have a fever for more than 3 days, or if you are not feeling better within 5 days.      What can I do to help myself?  Fill any prescriptions the doctor gave you and take them right away  If you have a fever, get plenty of rest and drink lots of fluids, especially water. Using a humidifier or saline nose spray will also help loosen secretions.   What clothes or blankets you have on won t change your fever. Do what is comfortable for you.  Bathing or  sponging in lukewarm water may help you feel better.  Tylenol  (acetaminophen), Motrin  (ibuprofen), or Advil  (ibuprofen) help bring fever down and may help you feel more comfortable. Be sure to read and follow the package directions, and ask your doctor if you have questions.  Do not drink alcohol.  Decongestants may help you feel better. You may use decongestant nose sprays Afrin  (oxymetazoline) or Krishna-Synephrine  (phenylephrine hydrochloride) for up to 3 days, or may use a decongestant tablet like Sudafed  (pseudoephedrine).  If you were given a prescription for medicine here today, be sure to read all of the information (including the package insert) that comes with your prescription.  This will include important information about the medicine, its side effects, and any warnings that you need to know about.  The pharmacist who fills the prescription can provide more information and answer questions you may have about the medicine.  If you have questions or concerns that the pharmacist cannot address, please call or return to the Emergency Department.   Opioid Medication Information    Pain medications are among the most commonly prescribed medicines, so we are including this information for all our patients. If you did not receive pain medication or get a prescription for pain medicine, you can ignore it.     You may have been given a prescription for an opioid (narcotic) pain medicine and/or have received a pain medicine while here in the Emergency Department. These medicines can make you drowsy or impaired. You must not drive, operate dangerous equipment, or engage in any other dangerous activities while taking these medications. If you drive while taking these medications, you could be arrested for DUI, or driving under the influence. Do not drink any alcohol while you are taking these medications.     Opioid pain medications can cause addiction. If you have a history of chemical dependency of any type, you  are at a higher risk of becoming addicted to pain medications.  Only take these prescribed medications to treat your pain when all other options have been tried. Take it for as short a time and as few doses as possible. Store your pain pills in a secure place, as they are frequently stolen and provide a dangerous opportunity for children or visitors in your house to start abusing these powerful medications. We will not replace any lost or stolen medicine.  As soon as your pain is better, you should flush all your remaining medication.     Many prescription pain medications contain Tylenol  (acetaminophen), including Vicodin , Tylenol #3 , Norco , Lortab , and Percocet .  You should not take any extra pills of Tylenol  if you are using these prescription medications or you can get very sick.  Do not ever take more than 3000 mg of acetaminophen in any 24 hour period.    All opioids tend to cause constipation. Drink plenty of water and eat foods that have a lot of fiber, such as fruits, vegetables, prune juice, apple juice and high fiber cereal.  Take a laxative if you don t move your bowels at least every other day. Miralax , Milk of Magnesia, Colace , or Senna  can be used to keep you regular.      Remember that you can always come back to the Emergency Department if you are not able to see your regular doctor in the amount of time listed above, if you get any new symptoms, or if there is anything that worries you.

## 2023-04-05 NOTE — ED PROVIDER NOTES
"Emergency Department Attending Supervision Note  4/5/2023  2:44 PM      I evaluated this patient in conjunction with Irena Tovar PA-C.      Briefly, the patient presented with cough, body aches, fatigue, headache for 1 week.  Daughter with strep throat.  Seen yesterday at urgent care and had strep test that on review, returned \"invalid.\"  No history of underlying lung disease, no history of asthma or tobacco use.    On my exam:  General:   Well-nourished   Speaking in full sentences   Intermittent spastic cough  Eyes:   Conjunctiva without injection or scleral icterus  ENT:   Moist mucous membranes   Nares patent   Pinnae normal  Neck:   Full ROM   No stiffness appreciated  Resp:   Faint expiratory wheezing   WOB otherwise unremarrkable  CV:    Normal rate, regular rhythm   S1 and S2 present   No murmur, gallop or rub  GI:   BS present   Abdomen soft without distention   Non-tender to light and deep palpation   No guarding or rebound tenderness  Skin:   Warm, dry, well perfused   No rashes or open wounds on exposed skin  MSK:   Moves all extremities   No focal deformities or swelling   Tenderness to palpation to bilateral lower chest wall  Neuro:   Alert   Answers questions appropriately   Moves all extremities equally   Gait stable  Psych:   Normal affect, normal mood    Results:  Chest XR,  PA & LAT   Final Result   IMPRESSION: There are no acute infiltrates. The cardiac silhouette is   not enlarged. Pulmonary vasculature is unremarkable.      THOR ANDERSON MD            SYSTEM ID:  [id number]        Labs Ordered and Resulted from Time of ED Arrival to Time of ED Departure   GROUP A STREPTOCOCCUS PCR THROAT SWAB - Abnormal       Result Value    Group A strep by PCR Detected (*)    INFLUENZA A/B, RSV, & SARS-COV2 PCR - Normal    Influenza A PCR Negative      Influenza B PCR Negative      RSV PCR Negative      SARS CoV2 PCR Negative           ED course:    My impression is upper respiratory infection, and " concurrent strep pharyngitis.  Rapid strep from yesterday returned negative, though confirmatory test invalid.  This was repeated today, returning positive for group A strep.  Patient will be started on antibiotics.  Suspect she concurrently has bronchitis given cough.  She has faint expiratory wheezing on exam, and will be discharged home on albuterol inhaler as well as prednisone.  Chest x-ray negative for pneumothorax or pneumonia.  Viral testing otherwise negative for influenza or COVID.  She exhibits reproducible tenderness to palpation along her chest wall consistent with musculoskeletal pain likely secondary from recurrent coughing.  She is without hypoxia and work of breathing otherwise unremarkable.  I do feel she is appropriate for discharge and supportive outpatient treatment.  Symptoms not consistent with ACS nor PE.  Follow-up with PCP in 2 to 3 days for recheck.  Return to ED with worsening pain, cough, shortness of breath or any other concerns.    Diagnosis    ICD-10-CM    1. Streptococcal pharyngitis  J02.0             Conrado Sauer MD Roach, Brian Donald, MD  04/05/23 1534

## 2023-04-05 NOTE — ED TRIAGE NOTES
Dry cough since last Wednesday. States sometimes she coughs up some phlegm. Seen at  yesterday and had negative strep swab. ABCs intact. A&OX4.      Triage Assessment     Row Name 04/05/23 1121       Triage Assessment (Adult)    Airway WDL WDL       Respiratory WDL    Respiratory WDL X;cough    Cough Frequency frequent    Cough Type dry       Skin Circulation/Temperature WDL    Skin Circulation/Temperature WDL WDL       Cardiac WDL    Cardiac WDL WDL       Peripheral/Neurovascular WDL    Peripheral Neurovascular WDL WDL       Cognitive/Neuro/Behavioral WDL    Cognitive/Neuro/Behavioral WDL WDL

## 2023-04-05 NOTE — ED PROVIDER NOTES
History     Chief Complaint:  Cough     The history is provided by the patient.      Katie Wilson is a 47 year old female who presents with cough x1 week that has been worsening. She endorses some loose stool, exhaustion, headache, chest pain and back pain with coughing. She reports she has tried tylenol, Nyquil, Mucinex, and amoxicillin (her daughter's) with no relief. Also reports thick sputum. She reports that pain and coughing are worse at night and while laying down. She reports going to urgent care yesterday, and has not had a chance to get her prescription. She denies vomiting, leg swelling, ear pain, fever. Denies history of asthma, or smoking.    She reports being unsure of being vaccinated for the flu, but is vaccinated against COVID. She also reports having a humidifier in her home, which she has not used in the last two nights.     Independent Historian:   None - Patient Only    Review of External Notes: I reviewed the patient's urgent care note from 4/4/23.  She was diagnosed with upper respiratory infection and prescribed Tessalon Perles.  Rapid strep test was negative, but PCR was invalid.  I also reviewed the patient's emergency medicine visit from January of this year when she was seen for a fall and she injured her chest.    ROS:  Review of Systems   Constitutional: Positive for fatigue.   HENT: Negative for ear pain.    Respiratory: Positive for cough.    Cardiovascular: Positive for chest pain. Negative for leg swelling.   Gastrointestinal: Positive for abdominal pain (lower left) and diarrhea. Negative for vomiting.   Musculoskeletal: Positive for back pain.   Neurological: Positive for headaches.   All other systems reviewed and are negative.    Allergies:  No known drug allergies     Medications:    Tessalon  Lexapro  Vesicare  Zoloft    Past Medical History:    Herpes simplex virus infection  Lump on right and left breast  Thyroid disorder  Uterine prolapse  Lump of right  breast  ELVIN  Complex right ovarian cyst  Cystocele with small rectocele and uterine descent     Past Surgical History:     x2    Family History:    Family history of asthma  Father: Cerebrovascular disease  Mother: Cervical cancer, hypertension    Social History:  Patient presents to the ED via private vehicle alone.   PCP: Carmen Atkins     Physical Exam     Patient Vitals for the past 24 hrs:   BP Temp Temp src Pulse Resp SpO2 Weight   23 1122 118/83 98.1  F (36.7  C) Temporal 82 16 100 % 59.8 kg (131 lb 13.4 oz)      Physical Exam  Vital signs and nursing notes reviewed.    General:  Alert and oriented, no acute distress.   Skin: Skin is warm and mildly diaphoretic. No rashes, lesions, or erythema.   HEENT:   Head: Normocephalic, atraumatic. Facial features symmetric.   Eyes: Conjunctiva pink, sclera white. EOMs grossly intact.   Ears: Auricles without lesion, erythema, or edema.   Nose: Symmetric with no discharge.  Mouth and throat: Lips are moist with no lesions or edema, oropharyngeal mucosa is mildly erythematous and moist without lesions or exudate. Uvula is midline.  Neck: Normal range of motion. Neck supple with no lymphadenopathy. No tracheal deviation.   CV:  Heart RRR with no murmurs or extra heart sounds. 2+ radial and tibialis posterior pulses bilaterally. No peripheral edema.  Pulm/Chest: Chest wall expansion symmetric with no increased effort of breathing. Mild wheezing to auscultation bilaterally posteriorly.   Abd:  Abdomen is soft and nontender to palpation in all 4 quadrants with no guarding or rebound.   M/S: Moves all extremities spontaneously.  Psych: Normal mood and affect. Behavior is normal.     Emergency Department Course     Imaging:  Chest XR,  PA & LAT   Final Result   IMPRESSION: There are no acute infiltrates. The cardiac silhouette is   not enlarged. Pulmonary vasculature is unremarkable.      THOR ANDERSON MD            SYSTEM ID:  [id number]          Report per radiology    Laboratory:  Labs Ordered and Resulted from Time of ED Arrival to Time of ED Departure   GROUP A STREPTOCOCCUS PCR THROAT SWAB - Abnormal       Result Value    Group A strep by PCR Detected (*)    INFLUENZA A/B, RSV, & SARS-COV2 PCR - Normal    Influenza A PCR Negative      Influenza B PCR Negative      RSV PCR Negative      SARS CoV2 PCR Negative        Procedures   None    Emergency Department Course & Assessments:     Interventions:  Medications - No data to display     Independent Interpretation (X-rays, CTs, rhythm strip):  I reviewed the patient's chest x-ray and appreciated no acute infiltrate or evidence of pneumonia.    Assessments/Consultations/Discussion of Management or Tests:  ED Course as of 04/05/23 1539   Wed Apr 05, 2023   1432 I obtained history and examined the patient as noted above.    1448 I performed strep test.    1526 I rechecked the patient and explained findings.      Social Determinants of Health affecting care:   None    Disposition:  The patient was discharged to home.     Impression & Plan      Medical Decision Making:  Katie Wilson is a 47 year old female who presents for evaluation of cough and sore throat x1 week. See HPI.  Patient is afebrile and vital signs are stable.  Her cough has been quite bothersome and persistent, resulting in rib pain and urinary incontinence due to her prolapse. On exam, patient's oropharynx was erythematous and she had wheezing to auscultation of both lungs posteriorly.  Patient saw urgent care yesterday and the rapid was negative, but there was an issue with the strep swab so this was obtained again today.  Rapid strep was positive today.  COVID, flu, RSV negative.  Chest x-ray was obtained and negative for infiltrate or evidence of pneumonia. We will send antibiotics for the streptococcal pharyngitis as well as prednisone and an albuterol inhaler for her ongoing upper respiratory infection that is likely viral in nature.   Also encouraged continued usage of over-the-counter analgesics and drinking plenty of fluids.  Recommend follow-up with primary care in the next 3 days or so and encouraged her to return to the emergency department should she develop shortness of breath or other concerning symptoms.  Patient will discharge home at this time and is agreeable to plan.     I staffed this patient with Dr. Sauer who was agreeable to the assessment and plan above.    Diagnosis:    ICD-10-CM    1. Streptococcal pharyngitis  J02.0       2. Upper respiratory tract infection, unspecified type  J06.9            Discharge Medications:  New Prescriptions    ALBUTEROL (PROAIR HFA/PROVENTIL HFA/VENTOLIN HFA) 108 (90 BASE) MCG/ACT INHALER    Inhale 2 puffs into the lungs every 6 hours as needed for shortness of breath, wheezing or cough    AMOXICILLIN (AMOXIL) 500 MG CAPSULE    Take 2 capsules (1,000 mg) by mouth 2 times daily for 10 days    PREDNISONE (DELTASONE) 20 MG TABLET    Take two tablets (= 40mg) each day for 5 (five) days       Scribe Disclosure:  I, Corinna Campbell, am serving as a scribe at 2:49 PM on 4/5/2023 to document services personally performed by Irena Tovar PA-C based on my observations and the provider's statements to me.     4/5/2023   Irena Tovar PA-C Meier, Victoria J, PA-C  04/05/23 1543

## 2023-06-05 ENCOUNTER — OFFICE VISIT (OUTPATIENT)
Dept: OBGYN | Facility: CLINIC | Age: 48
End: 2023-06-05
Payer: COMMERCIAL

## 2023-06-05 VITALS
WEIGHT: 131 LBS | SYSTOLIC BLOOD PRESSURE: 102 MMHG | BODY MASS INDEX: 26.41 KG/M2 | HEIGHT: 59 IN | DIASTOLIC BLOOD PRESSURE: 70 MMHG

## 2023-06-05 DIAGNOSIS — N32.81 URGENCY-FREQUENCY SYNDROME: ICD-10-CM

## 2023-06-05 DIAGNOSIS — N81.11 CYSTOCELE, MIDLINE: ICD-10-CM

## 2023-06-05 DIAGNOSIS — N81.4 CYSTOCELE WITH SMALL RECTOCELE AND UTERINE DESCENT: Primary | ICD-10-CM

## 2023-06-05 PROCEDURE — 99213 OFFICE O/P EST LOW 20 MIN: CPT | Performed by: OBSTETRICS & GYNECOLOGY

## 2023-06-05 RX ORDER — SOLIFENACIN SUCCINATE 10 MG/1
10 TABLET, FILM COATED ORAL DAILY
Qty: 90 TABLET | Refills: 3 | Status: SHIPPED | OUTPATIENT
Start: 2023-06-05

## 2023-06-05 NOTE — NURSING NOTE
"Chief Complaint   Patient presents with     Pessary Check/Fit/Insert       Initial /70   Ht 1.499 m (4' 11\")   Wt 59.4 kg (131 lb)   Breastfeeding No   BMI 26.46 kg/m   Estimated body mass index is 26.46 kg/m  as calculated from the following:    Height as of this encounter: 1.499 m (4' 11\").    Weight as of this encounter: 59.4 kg (131 lb).  BP completed using cuff size: regular    Questioned patient about current smoking habits.  Pt. has never smoked.          The following HM Due: NONE      The following patient reported/Care Every where data was sent to:  P ABSTRACT QUALITY INITIATIVES [[id number]]  Tanya Price LPN                "

## 2023-06-06 NOTE — PATIENT INSTRUCTIONS
You can reach your [facility] Team any time of the day by calling [phone]. This number will put you in touch with the 24 hour nurse line if the clinic is closed.    To contact your OB/GYN Station Coordinator/Surgery Scheduler please call [phone]. This is a direct number for your care team between 8 a.m. and 4 p.m. Monday through Friday.    [facility] is open for your convenience:  Monday through Friday 8 a.m. to 6 p.m.  Closed weekends and all major holidays.

## 2023-06-06 NOTE — PROGRESS NOTES
Katie Wilson is a 47 year old female  Patient's last menstrual period was 2022 (approximate).  Condoms for contraception, who presents for follow-up of the previous office visits from 10/8/2020, 10/22/2020, 2020, 2021, 2021, 2021, 2021, and 2022 when she was seen for evaluation of uterine prolapse and mixed urinary incontinence.  She felt that her symptoms have become more prominent since the birth of her last child.  The patient underwent a urodynamic study on 10/8/2020 showing evidence of pelvic floor relaxation with a symptomatic uterine prolapse cystocele and hypermobility of the urethrovesical angle with subsequent urinary stress incontinence with overactive bladder urgency frequency and urge incontinence.  We have discussed with the patient on multiple occasions treatment alternatives and she is adamant at this time that she does not want surgery as she has no one to care for her children during her postop recovery period.  Instead the patient has opted for #4 diaphragm pessary which she uses with good results.  The patient places it in the morning and removes it at night.  She uses it with Trimo-Zamora gel.  The patient also is using Vesicare 10 mg daily to control her urgency frequency syndrome symptoms.  The patient is happy with this form of management.  At present because of family constraints she is unable to do any surgical intervention and desires to continue with this program    Past Medical History:   Diagnosis Date     Abnormal Pap smear      Herpes simplex without mention of complication      Left breast lump      Lump of right breast      Thyroid disorder     as a child unknown if hyper or hypo     Current Outpatient Medications   Medication     oxyquinoline-sodium lauryl sulfate (TRIMO-ZAMORA) 0.025-0.01 % GEL vaginal gel     solifenacin (VESICARE) 10 MG tablet     albuterol (PROAIR HFA/PROVENTIL HFA/VENTOLIN HFA) 108 (90 Base) MCG/ACT inhaler      "benzonatate (TESSALON) 100 MG capsule     escitalopram (LEXAPRO) 10 MG tablet     multivitamin w/minerals (THERA-VIT-M) tablet     predniSONE (DELTASONE) 20 MG tablet     No current facility-administered medications for this visit.     /70   Ht 1.499 m (4' 11\")   Wt 59.4 kg (131 lb)   Breastfeeding No   BMI 26.46 kg/m    Constitutional: healthy, alert and no distress  Genitourinary: Normal external genitalia without lesions and the patient did not have her pessary in place at the time of the exam today. Speculum exam reveals a multiparous appearing cervix no lesions seen and on ectocervical Pap were done.  Bimanual exam the uterus is parous mobile smooth firm adnexa without masses enlargement or tenderness.  The cervix prolapses to the introitus and there is a cystocele anteriorly that prolapses to the introitus.  Rectovaginal exam reveals a grade 1 rectocele distally.    (N81.4) Cystocele with small rectocele and uterine descent  (primary encounter diagnosis)  Comment: Symptoms are presently well controlled with a #4 diaphragm pessary which she inserts in the morning and removes at night  Plan: I will see her back in 3 months for follow-up or sooner should concerns arise    (N81.11) Cystocele, midline  Comment: Patient requests a refill on the Trimo-Zamora gel  Plan: oxyquinoline-sodium lauryl sulfate (TRIMO-ZAMORA)         0.025-0.01 % GEL vaginal gel        Done    (N32.81) Urgency-frequency syndrome  Comment: Symptoms are well controlled with Vesicare and she desires to continue with this  Plan: solifenacin (VESICARE) 10 MG tablet        Refill given.  Fluid management and elimination of bladder irritants reviewed        "

## 2023-07-23 ENCOUNTER — HEALTH MAINTENANCE LETTER (OUTPATIENT)
Age: 48
End: 2023-07-23

## 2023-08-03 ENCOUNTER — HOSPITAL ENCOUNTER (OUTPATIENT)
Dept: MAMMOGRAPHY | Facility: CLINIC | Age: 48
Discharge: HOME | End: 2023-08-03
Attending: INTERNAL MEDICINE | Admitting: INTERNAL MEDICINE
Payer: COMMERCIAL

## 2023-08-03 DIAGNOSIS — Z12.31 VISIT FOR SCREENING MAMMOGRAM: ICD-10-CM

## 2023-08-03 PROCEDURE — 77067 SCR MAMMO BI INCL CAD: CPT

## 2023-08-15 ENCOUNTER — TELEPHONE (OUTPATIENT)
Dept: GASTROENTEROLOGY | Facility: CLINIC | Age: 48
End: 2023-08-15
Payer: COMMERCIAL

## 2023-08-15 ENCOUNTER — HOSPITAL ENCOUNTER (OUTPATIENT)
Facility: CLINIC | Age: 48
End: 2023-08-15
Attending: INTERNAL MEDICINE | Admitting: INTERNAL MEDICINE
Payer: COMMERCIAL

## 2023-08-15 NOTE — TELEPHONE ENCOUNTER
"Endoscopy Scheduling Screen    Have you had a positive Covid test in the last 14 days?  No    Are you active on MyChart?   Yes    What insurance is in the chart?  Other:  [facility]    Ordering/Referring Provider: TANISHA   (If ordering provider performs procedure, schedule with ordering provider unless otherwise instructed. )    BMI: Estimated body mass index is 26.46 kg/m  as calculated from the following:    Height as of 6/5/23: 1.499 m (4' 11\").    Weight as of 6/5/23: 59.4 kg (131 lb).     Sedation Ordered  moderate sedation.   If patient BMI > 50 do not schedule in ASC.    Are you taking any prescription medications for pain?   No    Are you taking methadone or Suboxone?  No    Do you have a history of malignant hyperthermia or adverse reaction to anesthesia?  No    (Females) Are you currently pregnant?   No     Have you been diagnosed or told you have pulmonary hypertension?   No    Do you have an LVAD?  No    Have you been told you have moderate to severe sleep apnea?  No    Have you been told you have COPD, asthma, or any other lung disease?  No    Do you have any heart conditions?  No     Have you ever had or are you awaiting a heart or lung transplant?   No    Have you had a stroke or transient ischemic attack (TIA aka \"mini stroke\" in the last 6 months?   No    Have you been diagnosed with or been told you have cirrhosis of the liver?   No    Are you currently on dialysis?   No    Do you need assistance transferring?   No    BMI: Estimated body mass index is 26.46 kg/m  as calculated from the following:    Height as of 6/5/23: 1.499 m (4' 11\").    Weight as of 6/5/23: 59.4 kg (131 lb).     Is patients BMI > 40 and scheduling location [facility]?  No    Do you take the medication Phentermine, Ozempic or Wegovy?  No    Do you take the medication Naltrexone?  No    Do you take blood thinners?  No      Prep   Are you currently on dialysis or do you have chronic kidney disease?  No    Do you have a diagnosis of " diabetes?  No    Do you have a diagnosis of cystic fibrosis (CF)?  No    On a regular basis do you go 3 -5 days between bowel movements?  No    BMI > 40?  No    Preferred Pharmacy:    [facility] [address]  [address]  [facility] [address]  Phone: [phone] Fax: [phone] Alternate Fax: [phone], [phone]      Final Scheduling Details   Colonoscopy prep sent?  MiraLAX (No Mag Citrate)    Procedure scheduled  Colonoscopy    Surgeon:  JOSEP     Date of procedure:  11/9/23     Schedule PAC:   No    Location  [facility]    Sedation   Moderate Sedation    Patient Reminders:   You will receive a call from a Nurse to review instructions and health history.  This assessment must be completed prior to your procedure.  Failure to complete the Nurse assessment may result in the procedure being cancelled.      On the day of your procedure, please designate an adult(s) who can drive you home stay with you for the next 24 hours. The medicines used in the exam will make you sleepy. You will not be able to drive.      You cannot take public transportation, ride share services, or non-medical taxi service without a responsible caregiver.  Medical transport services are allowed with the requirement that a responsible caregiver will receive you at your destination.  We require that drivers and caregivers are confirmed prior to your procedure.

## 2023-08-21 ENCOUNTER — ALLIED HEALTH/NURSE VISIT (OUTPATIENT)
Dept: INTERNAL MEDICINE | Facility: CLINIC | Age: 48
End: 2023-08-21
Payer: COMMERCIAL

## 2023-08-21 DIAGNOSIS — Z23 HEPATITIS B VACCINATION ADMINISTERED AT CURRENT VISIT: Primary | ICD-10-CM

## 2023-08-21 PROCEDURE — 90746 HEPB VACCINE 3 DOSE ADULT IM: CPT

## 2023-08-21 PROCEDURE — 90471 IMMUNIZATION ADMIN: CPT

## 2023-08-21 PROCEDURE — 99207 PR NO CHARGE NURSE ONLY: CPT

## 2023-08-21 NOTE — NURSING NOTE
Prior to immunization administration, verified patients identity using patient s name and date of birth. Please see Immunization Activity for additional information.     Screening Questionnaire for Adult Immunization    Are you sick today?   No   Do you have allergies to medications, food, a vaccine component or latex?   No   Have you ever had a serious reaction after receiving a vaccination?   No   Do you have a long-term health problem with heart, lung, kidney, or metabolic disease (e.g., diabetes), asthma, a blood disorder, no spleen, complement component deficiency, a cochlear implant, or a spinal fluid leak?  Are you on long-term aspirin therapy?   No   Do you have cancer, leukemia, HIV/AIDS, or any other immune system problem?   No   Do you have a parent, brother, or sister with an immune system problem?   No   In the past 3 months, have you taken medications that affect  your immune system, such as prednisone, other steroids, or anticancer drugs; drugs for the treatment of rheumatoid arthritis, Crohn s disease, or psoriasis; or have you had radiation treatments?   No   Have you had a seizure, or a brain or other nervous system problem?   No   During the past year, have you received a transfusion of blood or blood    products, or been given immune (gamma) globulin or antiviral drug?   No   For women: Are you pregnant or is there a chance you could become       pregnant during the next month?   No   Have you received any vaccinations in the past 4 weeks?   No     Immunization questionnaire answers were all negative.    I have reviewed the following standing orders:   This patient is due and qualifies for the Hepatitis B vaccine.    Click here for Hepatitis B Standing Order    I have reviewed the vaccines inclusion and exclusion criteria; No concerns regarding eligibility.     Patient instructed to remain in clinic for 15 minutes afterwards, and to report any adverse reactions.     Screening performed by [employer]  MARTHA Wise MA on 8/21/2023 at 8:32 AM.

## 2023-10-09 ENCOUNTER — HOSPITAL ENCOUNTER (EMERGENCY)
Facility: CLINIC | Age: 48
Discharge: HOME | End: 2023-10-09
Attending: STUDENT IN AN ORGANIZED HEALTH CARE EDUCATION/TRAINING PROGRAM | Admitting: STUDENT IN AN ORGANIZED HEALTH CARE EDUCATION/TRAINING PROGRAM
Payer: OTHER MISCELLANEOUS

## 2023-10-09 ENCOUNTER — APPOINTMENT (OUTPATIENT)
Dept: CT IMAGING | Facility: CLINIC | Age: 48
End: 2023-10-09
Attending: EMERGENCY MEDICINE
Payer: OTHER MISCELLANEOUS

## 2023-10-09 VITALS
SYSTOLIC BLOOD PRESSURE: 138 MMHG | OXYGEN SATURATION: 98 % | TEMPERATURE: 98.6 F | HEIGHT: 59 IN | BODY MASS INDEX: 26.67 KG/M2 | HEART RATE: 80 BPM | RESPIRATION RATE: 20 BRPM | WEIGHT: 132.28 LBS | DIASTOLIC BLOOD PRESSURE: 85 MMHG

## 2023-10-09 DIAGNOSIS — S09.90XA CLOSED HEAD INJURY, INITIAL ENCOUNTER: ICD-10-CM

## 2023-10-09 PROCEDURE — 70450 CT HEAD/BRAIN W/O DYE: CPT

## 2023-10-09 PROCEDURE — 250N000013 HC RX MED GY IP 250 OP 250 PS 637: Performed by: EMERGENCY MEDICINE

## 2023-10-09 PROCEDURE — 99284 EMERGENCY DEPT VISIT MOD MDM: CPT | Mod: 25

## 2023-10-09 PROCEDURE — 250N000011 HC RX IP 250 OP 636: Performed by: EMERGENCY MEDICINE

## 2023-10-09 RX ORDER — MECLIZINE HYDROCHLORIDE 25 MG/1
25 TABLET ORAL 3 TIMES DAILY PRN
Qty: 15 TABLET | Refills: 0 | Status: SHIPPED | OUTPATIENT
Start: 2023-10-09

## 2023-10-09 RX ORDER — ONDANSETRON 4 MG/1
4 TABLET, ORALLY DISINTEGRATING ORAL EVERY 8 HOURS PRN
Qty: 12 TABLET | Refills: 0 | Status: SHIPPED | OUTPATIENT
Start: 2023-10-09

## 2023-10-09 RX ORDER — ONDANSETRON 4 MG/1
4 TABLET, ORALLY DISINTEGRATING ORAL ONCE
Status: COMPLETED | OUTPATIENT
Start: 2023-10-09 | End: 2023-10-09

## 2023-10-09 RX ORDER — ACETAMINOPHEN 500 MG
1000 TABLET ORAL ONCE
Status: COMPLETED | OUTPATIENT
Start: 2023-10-09 | End: 2023-10-09

## 2023-10-09 RX ORDER — MECLIZINE HYDROCHLORIDE 25 MG/1
50 TABLET ORAL ONCE
Status: COMPLETED | OUTPATIENT
Start: 2023-10-09 | End: 2023-10-09

## 2023-10-09 RX ORDER — MECLIZINE HYDROCHLORIDE 25 MG/1
25 TABLET ORAL 3 TIMES DAILY PRN
Qty: 15 TABLET | Refills: 0 | Status: SHIPPED | OUTPATIENT
Start: 2023-10-09 | End: 2023-10-09

## 2023-10-09 RX ORDER — ONDANSETRON 4 MG/1
4 TABLET, ORALLY DISINTEGRATING ORAL EVERY 8 HOURS PRN
Qty: 12 TABLET | Refills: 0 | Status: SHIPPED | OUTPATIENT
Start: 2023-10-09 | End: 2023-10-09

## 2023-10-09 RX ADMIN — ACETAMINOPHEN 1000 MG: 500 TABLET, FILM COATED ORAL at 15:29

## 2023-10-09 RX ADMIN — ONDANSETRON 4 MG: 4 TABLET, ORALLY DISINTEGRATING ORAL at 15:50

## 2023-10-09 RX ADMIN — MECLIZINE HYDROCHLORIDE 50 MG: 25 TABLET ORAL at 15:29

## 2023-10-09 ASSESSMENT — ACTIVITIES OF DAILY LIVING (ADL): ADLS_ACUITY_SCORE: 35
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
## 2023-10-09 NOTE — Clinical Note
Katie Wilson was seen and treated in our emergency department on 10/9/2023.  She may return to work on 10/13/2023.       If you have any questions or concerns, please don't hesitate to call.      Edel Irwin, DO

## 2023-10-09 NOTE — ED NOTES
Pt returns from CT. CT tech reported pt vomited and had urinary incontinence while in CT room. Pt reports Hx of bladder prolapse and that incontinence is not abnormal for her with vomiting. MD notified.
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
## 2023-10-10 ENCOUNTER — OFFICE VISIT (OUTPATIENT)
Dept: OBGYN | Facility: CLINIC | Age: 48
End: 2023-10-10
Payer: COMMERCIAL

## 2023-10-10 VITALS — WEIGHT: 139 LBS | SYSTOLIC BLOOD PRESSURE: 104 MMHG | BODY MASS INDEX: 28.07 KG/M2 | DIASTOLIC BLOOD PRESSURE: 76 MMHG

## 2023-10-10 DIAGNOSIS — Z23 NEED FOR PROPHYLACTIC VACCINATION AND INOCULATION AGAINST INFLUENZA: Primary | ICD-10-CM

## 2023-10-10 DIAGNOSIS — N81.4 CYSTOCELE WITH SMALL RECTOCELE AND UTERINE DESCENT: ICD-10-CM

## 2023-10-10 PROCEDURE — 90471 IMMUNIZATION ADMIN: CPT | Performed by: OBSTETRICS & GYNECOLOGY

## 2023-10-10 PROCEDURE — 90686 IIV4 VACC NO PRSV 0.5 ML IM: CPT | Performed by: OBSTETRICS & GYNECOLOGY

## 2023-10-10 PROCEDURE — 99213 OFFICE O/P EST LOW 20 MIN: CPT | Mod: 25 | Performed by: OBSTETRICS & GYNECOLOGY

## 2023-10-10 NOTE — NURSING NOTE
"Chief Complaint   Patient presents with    Pessary Check/Fit/Insert    Imm/Inj     Flu Shot       Initial /76   Wt 63 kg (139 lb)   LMP 10/09/2023 (Approximate)   BMI 28.07 kg/m   Estimated body mass index is 28.07 kg/m  as calculated from the following:    Height as of 10/9/23: 1.499 m (4' 11\").    Weight as of this encounter: 63 kg (139 lb).  BP completed using cuff size: regular    Questioned patient about current smoking habits.  Pt. has never smoked.          The following HM Due: NONE      The following patient reported/Care Every where data was sent to:  P ABSTRACT QUALITY INITIATIVES [[id number]]        Becki Mays, [facility] [address] [facility]                 "

## 2023-10-10 NOTE — PROGRESS NOTES
Katie Wilson is a 48 year old female  Patient's last menstrual period was 2022 (approximate).  Condoms for contraception, who presents for follow-up of the previous office visits from 10/8/2020, 10/22/2020, 2020, 2021, 2021, 2021, 2021, 2022 and 2023 when she was seen for evaluation of uterine prolapse and mixed urinary incontinence.  She felt that her symptoms have become more prominent since the birth of her last child.  The patient underwent a urodynamic study on 10/8/2020 showing evidence of pelvic floor relaxation with a symptomatic uterine prolapse cystocele and hypermobility of the urethrovesical angle with subsequent urinary stress incontinence with overactive bladder urgency frequency and urge incontinence.  We have discussed with the patient on multiple occasions treatment alternatives and she is adamant at this time that she does not want surgery as she has no one to care for her children during her postop recovery period.  Instead the patient has opted for #4 diaphragm pessary which she uses with good results.  The patient places it in the morning and removes it at night.  She uses it with Trimo-Zamora gel.  This regimen provides her with adequate relief.  In the past she had also been treated with Vesicare 10 mg daily to aid with urgency frequency syndrome.  The patient says at this time that its not making a significant difference and she has stopped the medication    Past Medical History:   Diagnosis Date    Abnormal Pap smear     Herpes simplex without mention of complication     Left breast lump     Lump of right breast     Thyroid disorder     as a child unknown if hyper or hypo     Current Outpatient Medications   Medication    escitalopram (LEXAPRO) 10 MG tablet    meclizine (ANTIVERT) 25 MG tablet    multivitamin w/minerals (THERA-VIT-M) tablet    ondansetron (ZOFRAN ODT) 4 MG ODT tab    oxyquinoline-sodium lauryl sulfate (TRIMO-ZAMORA) 0.025-0.01 %  GEL vaginal gel    solifenacin (VESICARE) 10 MG tablet     No current facility-administered medications for this visit.     /76   Wt 63 kg (139 lb)   LMP 10/09/2023 (Approximate)   BMI 28.07 kg/m    Constitutional: healthy, alert, and no distress  Genitourinary: Normal external genitalia without lesions and menstrual type flow is present.  The pessary had been removed earlier as the patient removes it during her menses.  Speculum exam multiparous appearing cervix.  Previous cystocele uterine prolapse and hypermobility of the urethrovesical angle is noted but appear to be somewhat less prominent.  No ulceration or irritation is seen    (Z23) Need for prophylactic vaccination and inoculation against influenza  (primary encounter diagnosis)  Comment: Patient requests flu shot  Plan: Ordered    (N81.4) Cystocele with small rectocele and uterine descent  Comment: Symptoms are presently well controlled with a #4 diaphragm pessary and the patient declined surgical intervention.  The patient will continue to remove and insert on her previously scheduled basis.  I will see her back in December for follow-up or sooner should concerns arise  Plan: Plan reviewed with the patient

## 2023-10-10 NOTE — PATIENT INSTRUCTIONS
You can reach your [facility] Care Team any time of the day by calling [phone]. This number will put you in touch with the 24 hour nurse line if the clinic is closed.    To contact your OB/GYN Station Coordinator/Surgery Scheduler please call [phone]. This is a direct number for your care team between 8 a.m. and 4 p.m. Monday through Friday.    [facility] is open for your convenience:  Monday through Friday 8 a.m. to 6 p.m.  Closed weekends and all major holidays.

## 2023-11-09 ENCOUNTER — TELEPHONE (OUTPATIENT)
Dept: GASTROENTEROLOGY | Facility: CLINIC | Age: 48
End: 2023-11-09
Payer: COMMERCIAL

## 2023-11-09 ENCOUNTER — MYC MEDICAL ADVICE (OUTPATIENT)
Dept: GASTROENTEROLOGY | Facility: CLINIC | Age: 48
End: 2023-11-09
Payer: COMMERCIAL

## 2023-11-09 DIAGNOSIS — Z12.11 SCREENING FOR COLON CANCER: Primary | ICD-10-CM

## 2023-11-09 NOTE — TELEPHONE ENCOUNTER
Caller: Katie Wilson    Reason for Reschedule/Cancellation (please be detailed, any staff messages or encounters to note?): ride fell through      Prior to reschedule please review:  Ordering Provider: TANISHA  Sedation per order: CS  Does patient have any ASC Exclusions, please identify?: NO      Notes on Cancelled Procedure:  Procedure: Lower Endoscopy [Colonoscopy]   Date: 11/9  Location: [facility]; [facility] [address] [place] [address]  Surgeon: JOSEP      Rescheduled: Yes  Procedure: Lower Endoscopy [Colonoscopy]   Date: 2/13/24  Location: [facility]; [address] [place] [address]  Surgeon: JOSEP  Sedation Level Scheduled  CS,  Reason for Sedation Level PER ORDER  Prep/Instructions updated and sent: Y       Send In - basket message to Panc - Khurram Pool if EUS  procedure is canceled or rescheduled: [ N/A, YES or NO] N/A

## 2023-11-10 NOTE — TELEPHONE ENCOUNTER
----- Message from Yohana Oakes sent at 11/9/2023  8:17 AM CST -----  Regarding: Request for updated colonoscopy referral  Dr Atkins,     Could you please place an updated colonoscopy referral for this patient? Her procedure needed to be rescheduled to 2/13/24, and the current referral expires on 2/6/24.    Thank you so much,     Yohana   Endoscopy Scheduling

## 2024-01-05 ENCOUNTER — PATIENT OUTREACH (OUTPATIENT)
Dept: INTERNAL MEDICINE | Facility: CLINIC | Age: 49
End: 2024-01-05
Payer: COMMERCIAL

## 2024-01-08 ENCOUNTER — PATIENT OUTREACH (OUTPATIENT)
Dept: CARE COORDINATION | Facility: CLINIC | Age: 49
End: 2024-01-08
Payer: COMMERCIAL

## 2024-01-22 ENCOUNTER — PATIENT OUTREACH (OUTPATIENT)
Dept: CARE COORDINATION | Facility: CLINIC | Age: 49
End: 2024-01-22
Payer: COMMERCIAL

## 2024-02-13 ENCOUNTER — HOSPITAL ENCOUNTER (OUTPATIENT)
Facility: CLINIC | Age: 49
Discharge: HOME | End: 2024-02-13
Attending: INTERNAL MEDICINE | Admitting: INTERNAL MEDICINE
Payer: COMMERCIAL

## 2024-02-13 VITALS
SYSTOLIC BLOOD PRESSURE: 99 MMHG | DIASTOLIC BLOOD PRESSURE: 63 MMHG | TEMPERATURE: 97.2 F | HEIGHT: 59 IN | WEIGHT: 139 LBS | BODY MASS INDEX: 28.02 KG/M2 | RESPIRATION RATE: 16 BRPM | HEART RATE: 60 BPM | OXYGEN SATURATION: 98 %

## 2024-02-13 LAB — COLONOSCOPY: NORMAL

## 2024-02-13 PROCEDURE — G0121 COLON CA SCRN NOT HI RSK IND: HCPCS | Performed by: INTERNAL MEDICINE

## 2024-02-13 PROCEDURE — 258N000003 HC RX IP 258 OP 636: Performed by: INTERNAL MEDICINE

## 2024-02-13 PROCEDURE — 250N000011 HC RX IP 250 OP 636: Performed by: INTERNAL MEDICINE

## 2024-02-13 PROCEDURE — 45378 DIAGNOSTIC COLONOSCOPY: CPT | Performed by: INTERNAL MEDICINE

## 2024-02-13 PROCEDURE — G0500 MOD SEDAT ENDO SERVICE >5YRS: HCPCS | Performed by: INTERNAL MEDICINE

## 2024-02-13 RX ORDER — NALOXONE HYDROCHLORIDE 0.4 MG/ML
0.4 INJECTION, SOLUTION INTRAMUSCULAR; INTRAVENOUS; SUBCUTANEOUS
Status: DISCONTINUED | OUTPATIENT
Start: 2024-02-13 | End: 2024-02-13 | Stop reason: HOSPADM

## 2024-02-13 RX ORDER — PROCHLORPERAZINE MALEATE 10 MG
10 TABLET ORAL EVERY 6 HOURS PRN
Status: DISCONTINUED | OUTPATIENT
Start: 2024-02-13 | End: 2024-02-13 | Stop reason: HOSPADM

## 2024-02-13 RX ORDER — FLUMAZENIL 0.1 MG/ML
0.2 INJECTION, SOLUTION INTRAVENOUS
Status: DISCONTINUED | OUTPATIENT
Start: 2024-02-13 | End: 2024-02-13 | Stop reason: HOSPADM

## 2024-02-13 RX ORDER — ONDANSETRON 4 MG/1
4 TABLET, ORALLY DISINTEGRATING ORAL EVERY 6 HOURS PRN
Status: DISCONTINUED | OUTPATIENT
Start: 2024-02-13 | End: 2024-02-13 | Stop reason: HOSPADM

## 2024-02-13 RX ORDER — SIMETHICONE 40MG/0.6ML
133 SUSPENSION, DROPS(FINAL DOSAGE FORM)(ML) ORAL
Status: DISCONTINUED | OUTPATIENT
Start: 2024-02-13 | End: 2024-02-13 | Stop reason: HOSPADM

## 2024-02-13 RX ORDER — ONDANSETRON 2 MG/ML
4 INJECTION INTRAMUSCULAR; INTRAVENOUS
Status: DISCONTINUED | OUTPATIENT
Start: 2024-02-13 | End: 2024-02-13 | Stop reason: HOSPADM

## 2024-02-13 RX ORDER — NALOXONE HYDROCHLORIDE 0.4 MG/ML
0.2 INJECTION, SOLUTION INTRAMUSCULAR; INTRAVENOUS; SUBCUTANEOUS
Status: DISCONTINUED | OUTPATIENT
Start: 2024-02-13 | End: 2024-02-13 | Stop reason: HOSPADM

## 2024-02-13 RX ORDER — DIPHENHYDRAMINE HYDROCHLORIDE 50 MG/ML
25-50 INJECTION INTRAMUSCULAR; INTRAVENOUS
Status: DISCONTINUED | OUTPATIENT
Start: 2024-02-13 | End: 2024-02-13 | Stop reason: HOSPADM

## 2024-02-13 RX ORDER — ATROPINE SULFATE 0.1 MG/ML
1 INJECTION INTRAVENOUS
Status: DISCONTINUED | OUTPATIENT
Start: 2024-02-13 | End: 2024-02-13 | Stop reason: HOSPADM

## 2024-02-13 RX ORDER — ONDANSETRON 2 MG/ML
4 INJECTION INTRAMUSCULAR; INTRAVENOUS EVERY 6 HOURS PRN
Status: DISCONTINUED | OUTPATIENT
Start: 2024-02-13 | End: 2024-02-13 | Stop reason: HOSPADM

## 2024-02-13 RX ORDER — EPINEPHRINE 1 MG/ML
0.1 INJECTION, SOLUTION INTRAMUSCULAR; SUBCUTANEOUS
Status: DISCONTINUED | OUTPATIENT
Start: 2024-02-13 | End: 2024-02-13 | Stop reason: HOSPADM

## 2024-02-13 RX ORDER — LIDOCAINE 40 MG/G
CREAM TOPICAL
Status: DISCONTINUED | OUTPATIENT
Start: 2024-02-13 | End: 2024-02-13 | Stop reason: HOSPADM

## 2024-02-13 RX ORDER — FENTANYL CITRATE 50 UG/ML
50-100 INJECTION, SOLUTION INTRAMUSCULAR; INTRAVENOUS EVERY 5 MIN PRN
Status: DISCONTINUED | OUTPATIENT
Start: 2024-02-13 | End: 2024-02-13 | Stop reason: HOSPADM

## 2024-02-13 RX ADMIN — SODIUM CHLORIDE 500 ML: 9 INJECTION, SOLUTION INTRAVENOUS at 13:33

## 2024-02-13 RX ADMIN — FENTANYL CITRATE 50 MCG: 50 INJECTION, SOLUTION INTRAMUSCULAR; INTRAVENOUS at 13:23

## 2024-02-13 RX ADMIN — MIDAZOLAM 1 MG: 1 INJECTION INTRAMUSCULAR; INTRAVENOUS at 13:23

## 2024-02-13 RX ADMIN — MIDAZOLAM 1 MG: 1 INJECTION INTRAMUSCULAR; INTRAVENOUS at 13:20

## 2024-02-13 RX ADMIN — FENTANYL CITRATE 50 MCG: 50 INJECTION, SOLUTION INTRAMUSCULAR; INTRAVENOUS at 13:20

## 2024-02-13 ASSESSMENT — ACTIVITIES OF DAILY LIVING (ADL): ADLS_ACUITY_SCORE: 35

## 2024-02-13 NOTE — H&P
Pre-Endoscopy History and Physical     Katie Wilson MRN# [id number]   YOB: 1975 Age: 48 year old     Date of Procedure: 2024  Primary care provider: Carmen Atkins  Type of Endoscopy: Colonoscopy with possible biopsy, possible polypectomy  Reason for Procedure: screen  Type of Anesthesia Anticipated: Conscious Sedation    HPI:    aKtie is a 48 year old female who will be undergoing the above procedure.      A history and physical has been performed. The patient's medications and allergies have been reviewed. The risks and benefits of the procedure and the sedation options and risks were discussed with the patient.  All questions were answered and informed consent was obtained.      She denies a personal or family history of anesthesia complications or bleeding disorders.     Patient Active Problem List   Diagnosis    Lump of right breast    Thyroid disorder    Herpes simplex virus (HSV) infection    S/P     Moderate episode of recurrent major depressive disorder (H)    ELVIN (generalized anxiety disorder)    Complex cyst of right ovary    Cystocele with small rectocele and uterine descent    Mixed stress and urge urinary incontinence    Abnormal Pap smear of cervix        Past Medical History:   Diagnosis Date    Abnormal Pap smear     Herpes simplex without mention of complication     Left breast lump     Lump of right breast     Thyroid disorder     as a child unknown if hyper or hypo        Past Surgical History:   Procedure Laterality Date     SECTION N/A 2015    Procedure:  SECTION;  Surgeon: Yohana Monroy MD;  Location:  [facility]D     SECTION N/A 1/3/2017    Procedure:  SECTION;  Surgeon: Jerzy Camacho MD;  Location:  L[facility]D       Social History     Tobacco Use    Smoking status: Never    Smokeless tobacco: Never   Substance Use Topics    Alcohol use: No       Family History   Problem Relation Age of Onset    Hypertension  "Mother     Cervical Cancer Mother     Cerebrovascular Disease Father     Breast Cancer No family hx of     Ovarian Cancer No family hx of        Prior to Admission medications    Medication Sig Start Date End Date Taking? Authorizing Provider   escitalopram (LEXAPRO) 10 MG tablet Take 1 tablet (10 mg) by mouth daily 2/6/23  Yes Carmen Atkins MD   meclizine (ANTIVERT) 25 MG tablet Take 1 tablet (25 mg) by mouth 3 times daily as needed for dizziness 10/9/23  Yes Edel Irwin,    multivitamin w/minerals (THERA-VIT-M) tablet Take 1 tablet by mouth daily   Yes Reported, Patient   ondansetron (ZOFRAN ODT) 4 MG ODT tab Take 1 tablet (4 mg) by mouth every 8 hours as needed for nausea 10/9/23  Yes Edel Irwin, DO   oxyquinoline-sodium lauryl sulfate (TRIMO-FRIAS) 0.025-0.01 % GEL vaginal gel Place 113.4 g vaginally daily With pessary change as directed 6/5/23  Yes Douglas Sevilla MD   solifenacin (VESICARE) 10 MG tablet Take 1 tablet (10 mg) by mouth daily 6/5/23  Yes Douglas Sevilla MD       No Known Allergies     REVIEW OF SYSTEMS:   5 point ROS negative except as noted above in HPI, including Gen., Resp., CV, GI &  system review.    PHYSICAL EXAM:   BP (!) 116/92   Pulse 61   Temp 97.2  F (36.2  C) (Temporal)   Resp 16   Ht 1.499 m (4' 11\")   Wt 63 kg (139 lb)   SpO2 100%   BMI 28.07 kg/m   Estimated body mass index is 28.07 kg/m  as calculated from the following:    Height as of this encounter: 1.499 m (4' 11\").    Weight as of this encounter: 63 kg (139 lb).   GENERAL APPEARANCE: alert, and oriented  MENTAL STATUS: alert  AIRWAY EXAM: Mallampatti Class I (visualization of the soft palate, fauces, uvula, anterior and posterior pillars)  RESP: lungs clear to auscultation - no rales, rhonchi or wheezes  CV: regular rates and rhythm  DIAGNOSTICS:    Not indicated    IMPRESSION   ASA Class 2 - Mild systemic disease    PLAN:   Plan for Colonoscopy with possible biopsy, possible " polypectomy. We discussed the risks, benefits and alternatives and the patient wished to proceed.    The above has been forwarded to the consulting provider.      Signed Electronically by: Anatoly Porras MD  February 13, 2024

## 2024-03-06 NOTE — TELEPHONE ENCOUNTER
FYI to provider - Patient is lost to pap tracking follow-up. Attempts to contact pt have been made per reminder process and there has been no reply and/or no appt scheduled.       Hx of abnormal pap- Unknown dates and results.  2014 NIL pap  2017 NIL Pap, neg HPV  1/23/23 NIL pap, neg HPV. Plan cotest in 1 year per provider  1/5/24 Reminder mychart  2/6/24 Reminder call- spoke with pt  3/6/24 lost to follow up    Mehnaz Goodman RN, BSN

## 2024-04-28 ENCOUNTER — HEALTH MAINTENANCE LETTER (OUTPATIENT)
Age: 49
End: 2024-04-28

## 2024-08-04 ENCOUNTER — PATIENT OUTREACH (OUTPATIENT)
Dept: CARE COORDINATION | Facility: CLINIC | Age: 49
End: 2024-08-04
Payer: COMMERCIAL

## 2024-08-15 ENCOUNTER — OFFICE VISIT (OUTPATIENT)
Dept: OBGYN | Facility: CLINIC | Age: 49
End: 2024-08-15
Payer: COMMERCIAL

## 2024-08-15 VITALS — SYSTOLIC BLOOD PRESSURE: 112 MMHG | BODY MASS INDEX: 27.47 KG/M2 | DIASTOLIC BLOOD PRESSURE: 76 MMHG | WEIGHT: 136 LBS

## 2024-08-15 DIAGNOSIS — N32.81 URGENCY-FREQUENCY SYNDROME: Primary | ICD-10-CM

## 2024-08-15 DIAGNOSIS — N81.2 INCOMPLETE UTEROVAGINAL PROLAPSE: ICD-10-CM

## 2024-08-15 DIAGNOSIS — Z12.4 SCREENING FOR CERVICAL CANCER: ICD-10-CM

## 2024-08-15 PROCEDURE — 87624 HPV HI-RISK TYP POOLED RSLT: CPT | Performed by: OBSTETRICS & GYNECOLOGY

## 2024-08-15 PROCEDURE — G2211 COMPLEX E/M VISIT ADD ON: HCPCS | Mod: 53 | Performed by: OBSTETRICS & GYNECOLOGY

## 2024-08-15 PROCEDURE — G0145 SCR C/V CYTO,THINLAYER,RESCR: HCPCS | Performed by: OBSTETRICS & GYNECOLOGY

## 2024-08-15 PROCEDURE — 99214 OFFICE O/P EST MOD 30 MIN: CPT | Performed by: OBSTETRICS & GYNECOLOGY

## 2024-08-15 PROCEDURE — 99459 PELVIC EXAMINATION: CPT | Performed by: OBSTETRICS & GYNECOLOGY

## 2024-08-15 RX ORDER — MIRABEGRON 25 MG/1
25 TABLET, FILM COATED, EXTENDED RELEASE ORAL DAILY
Qty: 30 TABLET | Refills: 3 | Status: SHIPPED | OUTPATIENT
Start: 2024-08-15

## 2024-08-15 NOTE — NURSING NOTE
"Chief Complaint   Patient presents with    Pessary Check/Fit/Insert       Initial /76   Wt 61.7 kg (136 lb)   LMP 2024 (Approximate)   BMI 27.47 kg/m   Estimated body mass index is 27.47 kg/m  as calculated from the following:    Height as of 24: 1.499 m (4' 11\").    Weight as of this encounter: 61.7 kg (136 lb).  BP completed using cuff size: regular    Questioned patient about current smoking habits.  Pt. has never smoked.          The following HM Due: pap smear    "

## 2024-08-15 NOTE — PROGRESS NOTES
SUBJECTIVE:                                                     Katie Wilson is a 49 year old female  who presents today for pap smear, pessary follow up, urinary incontinence, and discussion of prolapse.    Urinary incontinence: leaking with jumping, movement. Also some urgency. Nocturia: 3-4x per night.  Was started on Vesicare by Dr. Sevilla in the past, took this for about a month and then discontinued it.  She still has some at home but has not taking it in a while.  She does not really remember if it was helpful.  She thinks that maybe she got up less at night but cannot remember if it was really helpful during the day.  She really did not have any side effects from it.  She remembers that it was expensive.  We talked about a different antispasmodic for the treatment of overactive bladder, and she would like to get a prescription for something different.  What she plans to do is try the Vesicare again and then if this does not work well she will try the new medication.  This was discussed in detail.  I can refill whichever med she decides to continue ultimately.  If she is having more difficulty and does not like either medication, at that point would consider referral to urology or urogynecology.  She also has stress incontinence as above.  She has been examined and had testing done previously with Dr. Sevilla, and this is thought to be mixed urinary incontinence.    Pessary: takes out if she has her period,otherwise takes out 2-4 times a month.  Reportedly this is for cystocele and rectocele as well as uterine prolapse.  This is the first time I am seeing or examining her.    Would be interested in surgery for incontinence and for but can't take the time off from her children as she has no help with this.  Not sexually active often.  Condoms for birth control.  She is due for a Pap smear today.        Problem list and histories reviewed & adjusted, as indicated.  Additional history: as  documented.    Patient Active Problem List   Diagnosis    Lump of right breast    Thyroid disorder    Herpes simplex virus (HSV) infection    S/P     Moderate episode of recurrent major depressive disorder (H)    ELVIN (generalized anxiety disorder)    Complex cyst of right ovary    Cystocele with small rectocele and uterine descent    Mixed stress and urge urinary incontinence    Abnormal Pap smear of cervix     Past Surgical History:   Procedure Laterality Date     SECTION N/A 2015    Procedure:  SECTION;  Surgeon: Yohana Monroy MD;  Location: [facility]     SECTION N/A 1/3/2017    Procedure:  SECTION;  Surgeon: Jerzy Camacho MD;  Location: [facility] L+[facility]    COLONOSCOPY N/A 2024    Procedure: Colonoscopy;  Surgeon: Anatoly Porras MD;  Location: [facility] GI      Social History     Tobacco Use    Smoking status: Never    Smokeless tobacco: Never   Substance Use Topics    Alcohol use: No      Problem (# of Occurrences) Relation (Name,Age of Onset)    Hypertension (1) Mother (Anabel Mandel)    Cerebrovascular Disease (1) Father ()    Cervical Cancer (1) Mother (Anabel Mandel)           Negative family history of: Breast Cancer, Ovarian Cancer              Current Outpatient Medications   Medication Sig Dispense Refill    mirabegron (MYRBETRIQ) 25 MG 24 hr tablet Take 1 tablet (25 mg) by mouth daily 30 tablet 3    multivitamin w/minerals (THERA-VIT-M) tablet Take 1 tablet by mouth daily      oxyquinoline-sodium lauryl sulfate (TRIMO-FRIAS) 0.025-0.01 % GEL vaginal gel Place 113.4 g vaginally daily With pessary change as directed 113.4 g 6    solifenacin (VESICARE) 10 MG tablet Take 1 tablet (10 mg) by mouth daily 90 tablet 3    escitalopram (LEXAPRO) 10 MG tablet Take 1 tablet (10 mg) by mouth daily (Patient not taking: Reported on 8/15/2024) 90 tablet 1    meclizine (ANTIVERT) 25 MG tablet Take 1 tablet (25 mg) by mouth 3 times daily as needed for dizziness  (Patient not taking: Reported on 8/15/2024) 15 tablet 0    ondansetron (ZOFRAN ODT) 4 MG ODT tab Take 1 tablet (4 mg) by mouth every 8 hours as needed for nausea (Patient not taking: Reported on 8/15/2024) 12 tablet 0     No current facility-administered medications for this visit.     No Known Allergies    ROS:  Negative other than as noted in HPI.    OBJECTIVE:     Exam:  Constitutional:  Appearance: Well nourished, well developed alert, in no acute distress  Chest:  Respiratory Effort:  Breathing unlabored  Lymphatic: no inguinal lymphadenopathy present  Skin:General Inspection:  No rashes present, no lesions present, no areas of discoloration  Neurologic/Psychiatric:  Mental Status:  Oriented X3   Pelvis: EGBUS within normal limits.  Size 4 ring with support pessary was removed.  On speculum exam, there is grade 2 to nearly grade 3 cervico uterine descensus.  The cervix appears normal.  ThinPrep Pap smear was performed.  Vaginal mucosa appears healthy, no excoriations noted.  There is a very mild grade 1 cystocele, no significant appreciable rectocele today.  The pessary was lubricated with Trimo-Zamora ointment and reinserted.      In-Clinic Test Results:  No results found for this or any previous visit (from the past 24 hour(s)).    ASSESSMENT/PLAN:                                                        ICD-10-CM    1. Urgency-frequency syndrome  N32.81 mirabegron (MYRBETRIQ) 25 MG 24 hr tablet      2. Screening for cervical cancer  Z12.4 Gynecologic Cytology (Pap) and HPV - Recommended Age 30-65 Years      3. Incomplete uterovaginal prolapse  N81.2             -Again discussed options for her stress and urge incontinence.  At this point, does not feel that she can do much about this given her family situation, but would be interested in trying a different medication for urge incontinence if she fails the Vesicare.  She has some Vesicare at home and will try that first, and then prescription for Myrbetriq was  sent for her to try if she does not do well with the Vesicare again.  Discussed common side effects, what to watch for, and I do feel she has a good understanding.  If she has additional issues, I would be happy to see her back or we can just refer her to urology or urogynecology for further workup.    -Pap smear done today.    -Discussed her prolapse.  At this point she is happy using a pessary, takes it out 3 or 4 times a month generally.  If she is having any abnormal bleeding or discomfort she will follow-up.  I should see her back about every 6 months at this point as long as she is taking it out.  If she is opting to leave it in for longer periods of time, 2 or 3 months at a time for example, would want to see her every 3 months.    Lauryn Ascencio MD  [facility]

## 2024-08-16 LAB
HPV HR 12 DNA CVX QL NAA+PROBE: NEGATIVE
HPV16 DNA CVX QL NAA+PROBE: NEGATIVE
HPV18 DNA CVX QL NAA+PROBE: NEGATIVE
HUMAN PAPILLOMA VIRUS FINAL DIAGNOSIS: NORMAL

## 2024-08-23 LAB
BKR LAB AP GYN ADEQUACY: NORMAL
BKR LAB AP GYN INTERPRETATION: NORMAL
BKR LAB AP LMP: NORMAL
BKR LAB AP PREVIOUS ABNORMAL: NORMAL
PATH REPORT.COMMENTS IMP SPEC: NORMAL
PATH REPORT.COMMENTS IMP SPEC: NORMAL
PATH REPORT.RELEVANT HX SPEC: NORMAL

## 2024-09-25 ENCOUNTER — OFFICE VISIT (OUTPATIENT)
Dept: FAMILY MEDICINE | Facility: CLINIC | Age: 49
End: 2024-09-25
Payer: OTHER MISCELLANEOUS

## 2024-09-25 VITALS
WEIGHT: 138.8 LBS | RESPIRATION RATE: 16 BRPM | OXYGEN SATURATION: 98 % | TEMPERATURE: 98.2 F | BODY MASS INDEX: 27.98 KG/M2 | HEIGHT: 59 IN | SYSTOLIC BLOOD PRESSURE: 110 MMHG | HEART RATE: 82 BPM | DIASTOLIC BLOOD PRESSURE: 74 MMHG

## 2024-09-25 DIAGNOSIS — N64.4 BREAST PAIN, RIGHT: Primary | ICD-10-CM

## 2024-09-25 PROCEDURE — 99213 OFFICE O/P EST LOW 20 MIN: CPT | Performed by: FAMILY MEDICINE

## 2024-09-25 ASSESSMENT — ANXIETY QUESTIONNAIRES
IF YOU CHECKED OFF ANY PROBLEMS ON THIS QUESTIONNAIRE, HOW DIFFICULT HAVE THESE PROBLEMS MADE IT FOR YOU TO DO YOUR WORK, TAKE CARE OF THINGS AT HOME, OR GET ALONG WITH OTHER PEOPLE: SOMEWHAT DIFFICULT
GAD7 TOTAL SCORE: 14
5. BEING SO RESTLESS THAT IT IS HARD TO SIT STILL: SEVERAL DAYS
7. FEELING AFRAID AS IF SOMETHING AWFUL MIGHT HAPPEN: NEARLY EVERY DAY
2. NOT BEING ABLE TO STOP OR CONTROL WORRYING: NEARLY EVERY DAY
1. FEELING NERVOUS, ANXIOUS, OR ON EDGE: NOT AT ALL
3. WORRYING TOO MUCH ABOUT DIFFERENT THINGS: NEARLY EVERY DAY
GAD7 TOTAL SCORE: 14
6. BECOMING EASILY ANNOYED OR IRRITABLE: NEARLY EVERY DAY

## 2024-09-25 ASSESSMENT — PATIENT HEALTH QUESTIONNAIRE - PHQ9
5. POOR APPETITE OR OVEREATING: SEVERAL DAYS
SUM OF ALL RESPONSES TO PHQ QUESTIONS 1-9: 8

## 2024-09-25 ASSESSMENT — PAIN SCALES - GENERAL: PAINLEVEL: MILD PAIN (2)

## 2024-09-25 NOTE — LETTER
September 25, 2024      Katie Wilson  [id number] [address] [id number]        To Whom It May Concern:    Katie Wilson was seen in our clinic today.  She may continue to work without restrictions.      Sincerely,      Lexii Coughlin

## 2024-09-25 NOTE — PATIENT INSTRUCTIONS
Use a warm compress for pain.  Tylenol or ibuprofen as needed for pain.    Compression with a sports bra might be helpful for pain.    Minimize lifting if able to.  Get help with lifting for the next 2-4 weeks.
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
## 2024-11-05 ASSESSMENT — ANXIETY QUESTIONNAIRES: GAD7 TOTAL SCORE: 14

## 2024-11-05 ASSESSMENT — PATIENT HEALTH QUESTIONNAIRE - PHQ9: SUM OF ALL RESPONSES TO PHQ QUESTIONS 1-9: 10

## 2024-11-06 ASSESSMENT — PATIENT HEALTH QUESTIONNAIRE - PHQ9: SUM OF ALL RESPONSES TO PHQ QUESTIONS 1-9: 15

## 2024-11-13 NOTE — PROGRESS NOTES
ASSESSMENT & PLAN  Patient Instructions     1. Bilateral foot pain    2. Plantar fasciitis, bilateral      -Patient has acute bilateral knee pain due to plantar fasciitis  -Patient started Diclofenac every 75 mg twice a day as needed  -Patient start home exercise program.  Handouts were given today  -An order was placed for custom orthotics  -Patient will continue follow-up with me further treatment recommendations  -Call direct clinic number [[phone]] at any time with questions or concerns.    Albert Yeo MD [facility]  [facility]        -----    SUBJECTIVE  Katie Wilson is a/an 49 year old female who is seen as a self referral for evaluation of bilateral foot pain. The patient is seen by themselves.    Onset: 2 month(s) ago. Reports insidious onset without acute precipitating event.  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    Past Medical History:   Diagnosis Date    Abnormal Pap smear     Herpes simplex without mention of complication     Left breast lump     Lump of right breast     Thyroid disorder     as a child unknown if hyper or hypo     Social History     Socioeconomic History    Marital status:    Tobacco Use    Smoking status: Never    Smokeless tobacco: Never   Vaping Use    Vaping status: Never Used   Substance and Sexual Activity    Alcohol use: No    Drug use: No    Sexual activity: Yes     Partners: Male     Birth control/protection: Condom   Other Topics Concern    Parent/sibling w/ CABG, MI or angioplasty before 65F 55M? No   Social History Narrative    Caffeine intake/servings daily - 1    Calcium  "intake/servings daily - 3    Exercise 4 times weekly - describe walks    Sunscreen used - Yes    Seatbelts used - Yes    Guns stored in the home - No    Self Breast Exam - No    Pap test up to date -  No    Eye exam up to date -  No    Dental exam up to date -  No    DEXA scan up to date -  No    Flex Sig/Colonoscopy up to date -  No    Mammography up to date -  No    Immunizations reviewed and up to date - Yes    Abuse: Current or Past (Physical, Sexual or Emotional) - No    Do you feel safe in your environment - Yes    Do you cope well with stress - Yes    Do you suffer from insomnia - No    Last updated by: Viktoria Houser  5/30/2014         Social Drivers of Health     Interpersonal Safety: Low Risk  (9/25/2024)    Interpersonal Safety     Do you feel physically and emotionally safe where you currently live?: Yes     Within the past 12 months, have you been hit, slapped, kicked or otherwise physically hurt by someone?: No     Within the past 12 months, have you been humiliated or emotionally abused in other ways by your partner or ex-partner?: No         Patient's past medical, surgical, social, and family histories were reviewed today and no changes are noted.    REVIEW OF SYSTEMS:  10 point ROS is negative other than symptoms noted above in HPI, Past Medical History or as stated below  Constitutional: NEGATIVE for fever, chills, change in weight  Skin: NEGATIVE for worrisome rashes, moles or lesions  GI/: NEGATIVE for bowel or bladder changes  Neuro: NEGATIVE for weakness, dizziness or paresthesias    OBJECTIVE:  /75   Pulse 78   Ht 1.499 m (4' 11\")   Wt 62.7 kg (138 lb 4.8 oz)   LMP 09/04/2024 (Approximate)   BMI 27.93 kg/m     General: healthy, alert and in no distress  HEENT: no scleral icterus or conjunctival erythema  Skin: no suspicious lesions or rash. No jaundice.  CV:  no pedal edema  Resp: normal respiratory effort without conversational dyspnea   Psych: normal mood and " affect  Gait: normal steady gait with appropriate coordination and balance  Neuro: Normal light sensory exam of lower extremity  MSK:  BILATERAL FOOT  Inspection:    no swelling or ecchymosis  Palpation:    Tender about the plantar fascia origin. Otherwise remainder of bony/ligamentous landmarks are non-tender.  Range of Motion:     Grossly intact and non-painful  Strength:    Grossly intact in all planes  Special Tests:    Positive: none    Negative: anterior drawer, heel strike, and calcaneal squeeze        Independent visualization of the below image:  No results found for this or any previous visit (from the past 24 hours).        Albert Yeo MD [facility]

## 2024-11-14 NOTE — PROGRESS NOTES
Katie Wilson is a 45 year old female  condoms for contraception not on HRT who presents today for a follow-up of the office visit of 10/8/2020.  To see the patient by Dr. Staley for evaluation of uterine prolapse and mixed urinary incontinence which have become more prominent since the birth of her last child.  At the time of her urodynamic study on 10/8/2020 there was evidence of pelvic floor relaxation with a symptomatic uterine prolapse, cystocele and hypermobility of the urethrovesical angle with subsequent urinary stress incontinence with an overactive bladder, urgency frequency and urge incontinence.  I again today reviewed the findings with the patient and discussed with her the risks benefits and alternative forms of therapy.  We reviewed the pathophysiology of the disease process and at this point the patient would like a trial of a pessary.  Risks, benefits, and alternative modes of therapy discussed at length. Pathophysiology of the disease process reviewed, all of the patients questions answered and informed consent obtained.  I went through insertion and removal of the pessary using our pelvic floor model today with the patient and I think she has a good understanding    Past Medical History:   Diagnosis Date     Abnormal Pap smear      Herpes simplex without mention of complication      Left breast lump      Lump of right breast      Thyroid disorder     as a child unknown if hyper or hypo     Current Outpatient Medications   Medication     PRENAT VIT-FEPOLY-METHYLFOL-FA PO     benzonatate (TESSALON) 100 MG capsule     escitalopram (LEXAPRO) 10 MG tablet     hydrOXYzine (ATARAX) 25 MG tablet     No current facility-administered medications for this visit.      Past Surgical History:   Procedure Laterality Date      SECTION N/A 2015    Procedure:  SECTION;  Surgeon: Yohana Monroy MD;  Location:  L+D      SECTION N/A 1/3/2017    Procedure:  SECTION;  " Surgeon: Jerzy Camacho MD;  Location:  L+D     Vital signs:      BP: 110/70               Weight: 60.3 kg (133 lb)  Estimated body mass index is 26.41 kg/m  as calculated from the following:    Height as of 2/3/20: 1.511 m (4' 11.5\").    Weight as of this encounter: 60.3 kg (133 lb).    Constitutional: healthy, alert and no distress  Genitourinary: Normal external genitalia without lesions and  Speculum exam grade 1 midline cystocele with grade 2 uterine descent to the antritis.  Multiparous appearing cervix no lesions seen, bimanual exam the uterus is parous mobile smooth firm adnexa without masses enlargement or tenderness, rectovaginal exam normal sphincter tone minimal grade 1 distal rectocele  The patient was fitted with a number for diaphragm type pessary with resulting good pelvic floor support.  She demonstrated an ability to insert and remove the pessary without difficulty    (Z23) Need for prophylactic vaccination and inoculation against influenza  (primary encounter diagnosis)  Comment: Patient desires flu shot  Plan: INFLUENZA VACCINE IM > 6 MONTHS VALENT IIV4         [[id number]]        Given    (N81.4) Cystocele with small rectocele and uterine descent  Comment: Patient   fitted with a #4 diaphragm type pessary.  The pessary was dressed with Trimo-Zamora gel and she was given a tube to use at home with instructions  Plan: I will see her back in several days in the office to reassess her status.  She will call for any concerns in the interval    (N39.46) Mixed stress and urge urinary incontinence  Comment: Cares discussed timed voiding was discussed elimination of bladder irritants discussed  Plan: We will reassess when I see her back in the office        " [phone]

## 2024-11-15 ENCOUNTER — OFFICE VISIT (OUTPATIENT)
Dept: ORTHOPEDICS | Facility: CLINIC | Age: 49
End: 2024-11-15
Payer: COMMERCIAL

## 2024-11-15 VITALS
WEIGHT: 138.3 LBS | DIASTOLIC BLOOD PRESSURE: 75 MMHG | HEIGHT: 59 IN | HEART RATE: 78 BPM | BODY MASS INDEX: 27.88 KG/M2 | SYSTOLIC BLOOD PRESSURE: 112 MMHG

## 2024-11-15 DIAGNOSIS — M79.672 BILATERAL FOOT PAIN: Primary | ICD-10-CM

## 2024-11-15 DIAGNOSIS — M72.2 PLANTAR FASCIITIS, BILATERAL: ICD-10-CM

## 2024-11-15 DIAGNOSIS — M79.671 BILATERAL FOOT PAIN: Primary | ICD-10-CM

## 2024-11-15 PROCEDURE — G2211 COMPLEX E/M VISIT ADD ON: HCPCS | Performed by: FAMILY MEDICINE

## 2024-11-15 PROCEDURE — 99204 OFFICE O/P NEW MOD 45 MIN: CPT | Performed by: FAMILY MEDICINE

## 2024-11-15 RX ORDER — DICLOFENAC SODIUM 75 MG/1
75 TABLET, DELAYED RELEASE ORAL 2 TIMES DAILY PRN
Qty: 60 TABLET | Refills: 0 | Status: SHIPPED | OUTPATIENT
Start: 2024-11-15

## 2024-11-15 NOTE — PATIENT INSTRUCTIONS
1. Bilateral foot pain    2. Plantar fasciitis, bilateral      -Patient has acute bilateral knee pain due to plantar fasciitis  -Patient started Diclofenac every 75 mg twice a day as needed  -Patient start home exercise program.  Handouts were given today  -An order was placed for custom orthotics  -Patient will continue follow-up with me further treatment recommendations  -Call direct clinic number [[phone]] at any time with questions or concerns.    Albert Yeo MD [facility]  [facility] [place]

## 2024-11-15 NOTE — LETTER
11/15/2024      Katie Wilson  [id number] [facility] [address]      Dear Colleague,    Thank you for referring your patient, Katie Wilson, to the [facility] [place]. Please see a copy of my visit note below.    ASSESSMENT & PLAN  Patient Instructions     1. Bilateral foot pain    2. Plantar fasciitis, bilateral      -Patient has acute bilateral knee pain due to plantar fasciitis  -Patient started Diclofenac every 75 mg twice a day as needed  -Patient start home exercise program.  Handouts were given today  -An order was placed for custom orthotics  -Patient will continue follow-up with me further treatment recommendations  -Call direct clinic number [[phone]] at any time with questions or concerns.    Albert Yeo MD [facility]  [facility]        -----    SUBJECTIVE  Katie Wilson is a/an 49 year old female who is seen as a self referral for evaluation of bilateral foot pain. The patient is seen by themselves.    Onset: 2 month(s) ago. Reports insidious onset without acute precipitating event.  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    Past Medical History:   Diagnosis Date     Abnormal Pap smear      Herpes simplex without mention of complication      Left breast lump      Lump of right breast      Thyroid disorder     as a child unknown if hyper or hypo     Social History     Socioeconomic History     Marital status:    Tobacco Use     Smoking status: Never     Smokeless tobacco: Never   Vaping Use     Vaping status: Never Used   Substance and Sexual Activity     Alcohol use: No  "    Drug use: No     Sexual activity: Yes     Partners: Male     Birth control/protection: Condom   Other Topics Concern     Parent/sibling w/ CABG, MI or angioplasty before 65F 55M? No   Social History Narrative    Caffeine intake/servings daily - 1    Calcium intake/servings daily - 3    Exercise 4 times weekly - describe walks    Sunscreen used - Yes    Seatbelts used - Yes    Guns stored in the home - No    Self Breast Exam - No    Pap test up to date -  No    Eye exam up to date -  No    Dental exam up to date -  No    DEXA scan up to date -  No    Flex Sig/Colonoscopy up to date -  No    Mammography up to date -  No    Immunizations reviewed and up to date - Yes    Abuse: Current or Past (Physical, Sexual or Emotional) - No    Do you feel safe in your environment - Yes    Do you cope well with stress - Yes    Do you suffer from insomnia - No    Last updated by: Viktoria Houser  5/30/2014         Social Drivers of Health     Interpersonal Safety: Low Risk  (9/25/2024)    Interpersonal Safety      Do you feel physically and emotionally safe where you currently live?: Yes      Within the past 12 months, have you been hit, slapped, kicked or otherwise physically hurt by someone?: No      Within the past 12 months, have you been humiliated or emotionally abused in other ways by your partner or ex-partner?: No         Patient's past medical, surgical, social, and family histories were reviewed today and no changes are noted.    REVIEW OF SYSTEMS:  10 point ROS is negative other than symptoms noted above in HPI, Past Medical History or as stated below  Constitutional: NEGATIVE for fever, chills, change in weight  Skin: NEGATIVE for worrisome rashes, moles or lesions  GI/: NEGATIVE for bowel or bladder changes  Neuro: NEGATIVE for weakness, dizziness or paresthesias    OBJECTIVE:  /75   Pulse 78   Ht 1.499 m (4' 11\")   Wt 62.7 kg (138 lb 4.8 oz)   LMP 09/04/2024 (Approximate)   BMI 27.93 kg/m   "   General: healthy, alert and in no distress  HEENT: no scleral icterus or conjunctival erythema  Skin: no suspicious lesions or rash. No jaundice.  CV:  no pedal edema  Resp: normal respiratory effort without conversational dyspnea   Psych: normal mood and affect  Gait: normal steady gait with appropriate coordination and balance  Neuro: Normal light sensory exam of lower extremity  MSK:  BILATERAL FOOT  Inspection:    no swelling or ecchymosis  Palpation:    Tender about the plantar fascia origin. Otherwise remainder of bony/ligamentous landmarks are non-tender.  Range of Motion:     Grossly intact and non-painful  Strength:    Grossly intact in all planes  Special Tests:    Positive: none    Negative: anterior drawer, heel strike, and calcaneal squeeze        Independent visualization of the below image:  No results found for this or any previous visit (from the past 24 hours).        Albert Yeo MD [facility]      Again, thank you for allowing me to participate in the care of your patient.        Sincerely,        Albert Yeo, MD

## 2024-11-19 ENCOUNTER — HOSPITAL ENCOUNTER (OUTPATIENT)
Dept: MAMMOGRAPHY | Facility: CLINIC | Age: 49
Discharge: HOME | End: 2024-11-19
Attending: INTERNAL MEDICINE
Payer: COMMERCIAL

## 2024-11-19 DIAGNOSIS — Z12.31 VISIT FOR SCREENING MAMMOGRAM: ICD-10-CM

## 2024-11-19 PROCEDURE — 77067 SCR MAMMO BI INCL CAD: CPT

## 2024-11-19 PROCEDURE — 77063 BREAST TOMOSYNTHESIS BI: CPT

## 2024-11-27 ENCOUNTER — MYC MEDICAL ADVICE (OUTPATIENT)
Dept: ORTHOPEDICS | Facility: CLINIC | Age: 49
End: 2024-11-27
Payer: COMMERCIAL

## 2025-04-08 ENCOUNTER — OFFICE VISIT (OUTPATIENT)
Dept: INTERNAL MEDICINE | Facility: CLINIC | Age: 50
End: 2025-04-08
Payer: COMMERCIAL

## 2025-04-08 VITALS
TEMPERATURE: 97.6 F | HEIGHT: 59 IN | SYSTOLIC BLOOD PRESSURE: 118 MMHG | RESPIRATION RATE: 20 BRPM | DIASTOLIC BLOOD PRESSURE: 74 MMHG | OXYGEN SATURATION: 99 % | BODY MASS INDEX: 28.51 KG/M2 | HEART RATE: 93 BPM | WEIGHT: 141.4 LBS

## 2025-04-08 DIAGNOSIS — F33.1 MODERATE EPISODE OF RECURRENT MAJOR DEPRESSIVE DISORDER (H): Primary | ICD-10-CM

## 2025-04-08 DIAGNOSIS — F41.1 GAD (GENERALIZED ANXIETY DISORDER): ICD-10-CM

## 2025-04-08 PROCEDURE — 99214 OFFICE O/P EST MOD 30 MIN: CPT | Performed by: INTERNAL MEDICINE

## 2025-04-08 PROCEDURE — 3074F SYST BP LT 130 MM HG: CPT | Performed by: INTERNAL MEDICINE

## 2025-04-08 PROCEDURE — 3078F DIAST BP <80 MM HG: CPT | Performed by: INTERNAL MEDICINE

## 2025-04-08 PROCEDURE — G2211 COMPLEX E/M VISIT ADD ON: HCPCS | Performed by: INTERNAL MEDICINE

## 2025-04-08 RX ORDER — ESCITALOPRAM OXALATE 10 MG/1
10 TABLET ORAL DAILY
Qty: 30 TABLET | Refills: 1 | Status: SHIPPED | OUTPATIENT
Start: 2025-04-08

## 2025-04-08 ASSESSMENT — ANXIETY QUESTIONNAIRES
8. IF YOU CHECKED OFF ANY PROBLEMS, HOW DIFFICULT HAVE THESE MADE IT FOR YOU TO DO YOUR WORK, TAKE CARE OF THINGS AT HOME, OR GET ALONG WITH OTHER PEOPLE?: SOMEWHAT DIFFICULT
GAD7 TOTAL SCORE: 17
7. FEELING AFRAID AS IF SOMETHING AWFUL MIGHT HAPPEN: MORE THAN HALF THE DAYS
1. FEELING NERVOUS, ANXIOUS, OR ON EDGE: SEVERAL DAYS
GAD7 TOTAL SCORE: 17
7. FEELING AFRAID AS IF SOMETHING AWFUL MIGHT HAPPEN: MORE THAN HALF THE DAYS
GAD7 TOTAL SCORE: 11
3. WORRYING TOO MUCH ABOUT DIFFERENT THINGS: NEARLY EVERY DAY
6. BECOMING EASILY ANNOYED OR IRRITABLE: SEVERAL DAYS
2. NOT BEING ABLE TO STOP OR CONTROL WORRYING: SEVERAL DAYS
5. BEING SO RESTLESS THAT IT IS HARD TO SIT STILL: NEARLY EVERY DAY
IF YOU CHECKED OFF ANY PROBLEMS ON THIS QUESTIONNAIRE, HOW DIFFICULT HAVE THESE PROBLEMS MADE IT FOR YOU TO DO YOUR WORK, TAKE CARE OF THINGS AT HOME, OR GET ALONG WITH OTHER PEOPLE: SOMEWHAT DIFFICULT

## 2025-04-08 ASSESSMENT — PATIENT HEALTH QUESTIONNAIRE - PHQ9
SUM OF ALL RESPONSES TO PHQ QUESTIONS 1-9: 12
5. POOR APPETITE OR OVEREATING: NOT AT ALL

## 2025-04-08 NOTE — NURSING NOTE
"/74   Pulse 93   Temp 97.6  F (36.4  C) (Tympanic)   Resp 20   Ht 1.499 m (4' 11\")   Wt 64.1 kg (141 lb 6.4 oz)   LMP 04/04/2025 (Approximate)   SpO2 99%   BMI 28.56 kg/m      "

## 2025-04-10 ASSESSMENT — ANXIETY QUESTIONNAIRES: GAD7 TOTAL SCORE: 11

## 2025-05-31 DIAGNOSIS — F41.1 GAD (GENERALIZED ANXIETY DISORDER): ICD-10-CM

## 2025-05-31 DIAGNOSIS — F33.1 MODERATE EPISODE OF RECURRENT MAJOR DEPRESSIVE DISORDER (H): ICD-10-CM

## 2025-06-03 RX ORDER — ESCITALOPRAM OXALATE 10 MG/1
10 TABLET ORAL DAILY
Qty: 7 TABLET | Refills: 0 | Status: SHIPPED | OUTPATIENT
Start: 2025-06-03

## 2025-06-07 SDOH — HEALTH STABILITY: PHYSICAL HEALTH: ON AVERAGE, HOW MANY DAYS PER WEEK DO YOU ENGAGE IN MODERATE TO STRENUOUS EXERCISE (LIKE A BRISK WALK)?: 0 DAYS

## 2025-06-07 SDOH — HEALTH STABILITY: PHYSICAL HEALTH: ON AVERAGE, HOW MANY MINUTES DO YOU ENGAGE IN EXERCISE AT THIS LEVEL?: 0 MIN

## 2025-06-10 ENCOUNTER — OFFICE VISIT (OUTPATIENT)
Dept: INTERNAL MEDICINE | Facility: CLINIC | Age: 50
End: 2025-06-10
Payer: COMMERCIAL

## 2025-06-10 VITALS
TEMPERATURE: 97 F | BODY MASS INDEX: 27.13 KG/M2 | HEART RATE: 84 BPM | HEIGHT: 59 IN | DIASTOLIC BLOOD PRESSURE: 79 MMHG | SYSTOLIC BLOOD PRESSURE: 111 MMHG | OXYGEN SATURATION: 99 % | WEIGHT: 134.6 LBS | RESPIRATION RATE: 17 BRPM

## 2025-06-10 DIAGNOSIS — Z00.00 ROUTINE HISTORY AND PHYSICAL EXAMINATION OF ADULT: Primary | ICD-10-CM

## 2025-06-10 DIAGNOSIS — F33.1 MODERATE EPISODE OF RECURRENT MAJOR DEPRESSIVE DISORDER (H): ICD-10-CM

## 2025-06-10 DIAGNOSIS — F41.1 GAD (GENERALIZED ANXIETY DISORDER): ICD-10-CM

## 2025-06-10 LAB
ALBUMIN SERPL BCG-MCNC: 4.4 G/DL (ref 3.5–5.2)
ALP SERPL-CCNC: 102 U/L (ref 40–150)
ALT SERPL W P-5'-P-CCNC: 22 U/L (ref 0–50)
ANION GAP SERPL CALCULATED.3IONS-SCNC: 10 MMOL/L (ref 7–15)
AST SERPL W P-5'-P-CCNC: 29 U/L (ref 0–45)
BILIRUB SERPL-MCNC: 0.2 MG/DL
BUN SERPL-MCNC: 13.4 MG/DL (ref 6–20)
CALCIUM SERPL-MCNC: 9.7 MG/DL (ref 8.8–10.4)
CHLORIDE SERPL-SCNC: 102 MMOL/L (ref 98–107)
CHOLEST SERPL-MCNC: 214 MG/DL
CREAT SERPL-MCNC: 0.67 MG/DL (ref 0.51–0.95)
EGFRCR SERPLBLD CKD-EPI 2021: >90 ML/MIN/1.73M2
ERYTHROCYTE [DISTWIDTH] IN BLOOD BY AUTOMATED COUNT: 13 % (ref 10–15)
FASTING STATUS PATIENT QL REPORTED: YES
FASTING STATUS PATIENT QL REPORTED: YES
GLUCOSE SERPL-MCNC: 98 MG/DL (ref 70–99)
HCO3 SERPL-SCNC: 27 MMOL/L (ref 22–29)
HCT VFR BLD AUTO: 38.5 % (ref 35–47)
HDLC SERPL-MCNC: 43 MG/DL
HGB BLD-MCNC: 12.6 G/DL (ref 11.7–15.7)
LDLC SERPL CALC-MCNC: 143 MG/DL
MCH RBC QN AUTO: 27.5 PG (ref 26.5–33)
MCHC RBC AUTO-ENTMCNC: 32.7 G/DL (ref 31.5–36.5)
MCV RBC AUTO: 84 FL (ref 78–100)
NONHDLC SERPL-MCNC: 171 MG/DL
PLATELET # BLD AUTO: 403 10E3/UL (ref 150–450)
POTASSIUM SERPL-SCNC: 4.2 MMOL/L (ref 3.4–5.3)
PROT SERPL-MCNC: 8 G/DL (ref 6.4–8.3)
RBC # BLD AUTO: 4.58 10E6/UL (ref 3.8–5.2)
SODIUM SERPL-SCNC: 139 MMOL/L (ref 135–145)
TRIGL SERPL-MCNC: 141 MG/DL
TSH SERPL DL<=0.005 MIU/L-ACNC: 1 UIU/ML (ref 0.3–4.2)
WBC # BLD AUTO: 5.4 10E3/UL (ref 4–11)

## 2025-06-10 PROCEDURE — 80061 LIPID PANEL: CPT | Performed by: INTERNAL MEDICINE

## 2025-06-10 PROCEDURE — 3074F SYST BP LT 130 MM HG: CPT | Performed by: INTERNAL MEDICINE

## 2025-06-10 PROCEDURE — 99396 PREV VISIT EST AGE 40-64: CPT | Performed by: INTERNAL MEDICINE

## 2025-06-10 PROCEDURE — 85027 COMPLETE CBC AUTOMATED: CPT | Performed by: INTERNAL MEDICINE

## 2025-06-10 PROCEDURE — G2211 COMPLEX E/M VISIT ADD ON: HCPCS | Performed by: INTERNAL MEDICINE

## 2025-06-10 PROCEDURE — 36415 COLL VENOUS BLD VENIPUNCTURE: CPT | Performed by: INTERNAL MEDICINE

## 2025-06-10 PROCEDURE — 3078F DIAST BP <80 MM HG: CPT | Performed by: INTERNAL MEDICINE

## 2025-06-10 PROCEDURE — 99214 OFFICE O/P EST MOD 30 MIN: CPT | Mod: 25 | Performed by: INTERNAL MEDICINE

## 2025-06-10 PROCEDURE — 96127 BRIEF EMOTIONAL/BEHAV ASSMT: CPT | Performed by: INTERNAL MEDICINE

## 2025-06-10 PROCEDURE — 84443 ASSAY THYROID STIM HORMONE: CPT | Performed by: INTERNAL MEDICINE

## 2025-06-10 PROCEDURE — 80053 COMPREHEN METABOLIC PANEL: CPT | Performed by: INTERNAL MEDICINE

## 2025-06-10 RX ORDER — ESCITALOPRAM OXALATE 10 MG/1
10 TABLET ORAL DAILY
Qty: 90 TABLET | Refills: 1 | Status: SHIPPED | OUTPATIENT
Start: 2025-06-10

## 2025-06-10 ASSESSMENT — PATIENT HEALTH QUESTIONNAIRE - PHQ9
10. IF YOU CHECKED OFF ANY PROBLEMS, HOW DIFFICULT HAVE THESE PROBLEMS MADE IT FOR YOU TO DO YOUR WORK, TAKE CARE OF THINGS AT HOME, OR GET ALONG WITH OTHER PEOPLE: SOMEWHAT DIFFICULT
SUM OF ALL RESPONSES TO PHQ QUESTIONS 1-9: 4
SUM OF ALL RESPONSES TO PHQ QUESTIONS 1-9: 4

## 2025-06-10 ASSESSMENT — ANXIETY QUESTIONNAIRES
8. IF YOU CHECKED OFF ANY PROBLEMS, HOW DIFFICULT HAVE THESE MADE IT FOR YOU TO DO YOUR WORK, TAKE CARE OF THINGS AT HOME, OR GET ALONG WITH OTHER PEOPLE?: SOMEWHAT DIFFICULT
4. TROUBLE RELAXING: MORE THAN HALF THE DAYS
7. FEELING AFRAID AS IF SOMETHING AWFUL MIGHT HAPPEN: SEVERAL DAYS
GAD7 TOTAL SCORE: 5
5. BEING SO RESTLESS THAT IT IS HARD TO SIT STILL: NOT AT ALL
3. WORRYING TOO MUCH ABOUT DIFFERENT THINGS: SEVERAL DAYS
7. FEELING AFRAID AS IF SOMETHING AWFUL MIGHT HAPPEN: SEVERAL DAYS
GAD7 TOTAL SCORE: 5
GAD7 TOTAL SCORE: 5
1. FEELING NERVOUS, ANXIOUS, OR ON EDGE: NOT AT ALL
2. NOT BEING ABLE TO STOP OR CONTROL WORRYING: NOT AT ALL
IF YOU CHECKED OFF ANY PROBLEMS ON THIS QUESTIONNAIRE, HOW DIFFICULT HAVE THESE PROBLEMS MADE IT FOR YOU TO DO YOUR WORK, TAKE CARE OF THINGS AT HOME, OR GET ALONG WITH OTHER PEOPLE: SOMEWHAT DIFFICULT
6. BECOMING EASILY ANNOYED OR IRRITABLE: SEVERAL DAYS

## 2025-06-10 NOTE — PROGRESS NOTES
"Preventive Care Visit  [facility]  Carmen Atkins MD, Internal Medicine  Abe 10, 2025      Assessment & Plan     (Z00.00) Routine history and physical examination of adult  (primary encounter diagnosis)  Plan: CBC with platelets, Comprehensive metabolic         panel, Lipid panel reflex to direct LDL         Fasting, TSH with free T4 reflex            (F33.1) Moderate episode of recurrent major depressive disorder (H)  (F41.1) ELVIN (generalized anxiety disorder)  Plan: improved, refilled escitalopram (LEXAPRO) 10 MG tablet as directed.explained clearly about the medication,insructions and side effects.              Patient has been advised of split billing requirements and indicates understanding: Yes        BMI  Estimated body mass index is 27.19 kg/m  as calculated from the following:    Height as of this encounter: 1.499 m (4' 11\").    Weight as of this encounter: 61.1 kg (134 lb 9.6 oz).   Weight management plan: Discussed healthy diet and exercise guidelines    Counseling  Appropriate preventive services were addressed with this patient via screening, questionnaire, or discussion as appropriate for fall prevention, nutrition, physical activity, Tobacco-use cessation, social engagement, weight loss and cognition.  Checklist reviewing preventive services available has been given to the patient.  Reviewed patient's diet, addressing concerns and/or questions.       Follow-up 6 months      The longitudinal plan of care for the diagnosis(es)/condition(s) as documented were addressed during this visit. Due to the added complexity in care, I will continue to support Katie in the subsequent management and with ongoing continuity of care.        Subjective   Katie is a 49 year old, presenting for the following:  Physical        6/10/2025     7:46 AM   Additional Questions   Roomed by edson   Accompanied by self         6/10/2025     7:46 AM   Patient Reported Additional Medications "   Patient reports taking the following new medications none          HPI     Depression and Anxiety   How are you doing with your depression since your last visit? Improved   How are you doing with your anxiety since your last visit?  No change  Are you having other symptoms that might be associated with depression or anxiety? No  Have you had a significant life event? No   Do you have any concerns with your use of alcohol or other drugs? No         9/25/2024     4:15 PM 4/8/2025     2:09 PM 6/10/2025     7:41 AM   PHQ   PHQ-9 Total Score 8 12 4    Q9: Thoughts of better off dead/self-harm past 2 weeks Not at all Not at all Not at all       Patient-reported          Advance Care Planning    Discussed advance care planning with patient; informed AVS has link to [facility].        6/7/2025   General Health   How would you rate your overall physical health? Good   Feel stress (tense, anxious, or unable to sleep) Only a little   (!) STRESS CONCERN      6/7/2025   Nutrition   Three or more servings of calcium each day? Yes   Diet: Regular (no restrictions)   How many servings of fruit and vegetables per day? (!) 2-3   How many sweetened beverages each day? 0-1         6/7/2025   Exercise   Days per week of moderate/strenous exercise 0 days   Average minutes spent exercising at this level 0 min   (!) EXERCISE CONCERN      6/7/2025   Social Factors   Worry food won't last until get money to buy more No   Food not last or not have enough money for food? No   Do you have housing? (Housing is defined as stable permanent housing and does not include staying outside in a car, in a tent, in an abandoned building, in an overnight shelter, or couch-surfing.) Yes   Are you worried about losing your housing? No   Lack of transportation? No   Unable to get utilities (heat,electricity)? No         6/10/2025   Fall Risk   Fallen 2 or more times in the past year? No   Trouble with walking or balance? No          6/7/2025   Dental    Dentist two times every year? Yes       Today's PHQ-9 Score:       6/10/2025     7:41 AM   PHQ-9 SCORE   PHQ-9 Total Score MyChart 4 (Minimal depression)   PHQ-9 Total Score 4        Patient-reported         6/7/2025   Substance Use   Alcohol more than 3/day or more than 7/wk No   Do you use any other substances recreationally? No     Social History     Tobacco Use    Smoking status: Never    Smokeless tobacco: Never   Vaping Use    Vaping status: Never Used   Substance Use Topics    Alcohol use: No    Drug use: No           11/19/2024   LAST FHS-7 RESULTS   1st degree relative breast or ovarian cancer No   Any relative bilateral breast cancer No   Any male have breast cancer No   Any ONE woman have BOTH breast AND ovarian cancer No   Any woman with breast cancer before 50yrs No   2 or more relatives with breast AND/OR ovarian cancer No   2 or more relatives with breast AND/OR bowel cancer No        Mammogram 11/24 6/7/2025   STI Screening   New sexual partner(s) since last STI/HIV test? No     History of abnormal Pap smear: No - age 30- 64 PAP with HPV every 5 years recommended        Latest Ref Rng & Units 8/15/2024    10:34 AM 1/23/2023     4:00 PM 11/17/2017     9:13 AM   PAP / HPV   PAP  Unsatisfactory for evaluation  Negative for Intraepithelial Lesion or Malignancy (NILM)     PAP (Historical)    NIL    HPV 16 DNA Negative Negative  Negative     HPV 18 DNA Negative Negative  Negative     Other HR HPV Negative Negative  Negative       ASCVD Risk   The 10-year ASCVD risk score (Marcia ARMIJO, et al., 2019) is: 1%    Values used to calculate the score:      Age: 49 years      Sex: Female      Is Non- : No      Diabetic: No      Tobacco smoker: No      Systolic Blood Pressure: 118 mmHg      Is BP treated: No      HDL Cholesterol: 49 mg/dL      Total Cholesterol: 198 mg/dL        6/7/2025   Contraception/Family Planning   Questions about contraception or family planning No  "       Reviewed and updated as needed this visit by Provider                    Past Medical History:   Diagnosis Date    Abnormal Pap smear     Herpes simplex without mention of complication     Left breast lump     Lump of right breast     Thyroid disorder     as a child unknown if hyper or hypo     Past Surgical History:   Procedure Laterality Date     SECTION N/A 2015    Procedure:  SECTION;  Surgeon: Yohana Monroy MD;  Location: [facility] L+[facility]     SECTION N/A 1/3/2017    Procedure:  SECTION;  Surgeon: Jerzy Camacho MD;  Location: [facility] L+D    COLONOSCOPY N/A 2024    Procedure: Colonoscopy;  Surgeon: Anatoly Porras MD;  Location:  [facility]     Current Outpatient Medications   Medication Sig Dispense Refill    escitalopram (LEXAPRO) 10 MG tablet Take 1 tablet (10 mg) by mouth daily. 90 tablet 1    multivitamin w/minerals (THERA-VIT-M) tablet Take 1 tablet by mouth daily      oxyquinoline-sodium lauryl sulfate (TRIMO-FRIAS) 0.025-0.01 % GEL vaginal gel Place 113.4 g vaginally daily With pessary change as directed 113.4 g 6         Review of Systems  CONSTITUTIONAL: NEGATIVE for fever, chills,   EYES: NEGATIVE for vision changes or irritation  ENT/MOUTH: NEGATIVE for ear, mouth and throat problems  RESP: NEGATIVE for significant cough or SOB  BREAST: NEGATIVE for masses, tenderness or discharge  CV: NEGATIVE for chest pain, palpitations or peripheral edema  GI: NEGATIVE for nausea, abdominal pain, heartburn, or change in bowel habits  : NEGATIVE for frequency, dysuria, or hematuria  MUSCULOSKELETAL: NEGATIVE for significant arthralgias or myalgia  NEURO: NEGATIVE for weakness, dizziness or paresthesias  ENDOCRINE: NEGATIVE for temperature intolerance, skin/hair changes  HEME: NEGATIVE for bleeding problems  PSYCHIATRIC: NEGATIVE for changes in mood or affect     Objective    Exam  /79   Pulse 84   Temp 97  F (36.1  C) (Tympanic)   Resp 17   Ht 1.499 m (4' 11\")  " " Wt 61.1 kg (134 lb 9.6 oz)   LMP 05/20/2025   SpO2 99%   BMI 27.19 kg/m     Estimated body mass index is 27.19 kg/m  as calculated from the following:    Height as of this encounter: 1.499 m (4' 11\").    Weight as of this encounter: 61.1 kg (134 lb 9.6 oz).    Physical Exam  GENERAL: alert and no distress  EYES: Eyes grossly normal to inspection, PERRL and conjunctivae and sclerae normal  HENT: ear canals and TM's normal, nose and mouth without ulcers or lesions  RESP: lungs clear to auscultation - no rales, rhonchi or wheezes  BREAST: normal without masses, tenderness or nipple discharge and no palpable axillary masses or adenopathy  CV: regular rate and rhythm, normal S1 S2,    ABDOMEN: soft, nontender,  and bowel sounds normal  MS: no gross musculoskeletal defects noted, no edema  NEURO: Normal strength and tone, mentation intact and speech normal  PSYCH: mentation appears normal, affect normal/bright      Signed Electronically by: Carmen Atkins MD         "

## 2025-06-10 NOTE — NURSING NOTE
"/79   Pulse 84   Temp 97  F (36.1  C) (Tympanic)   Resp 17   Ht 1.499 m (4' 11\")   Wt 61.1 kg (134 lb 9.6 oz)   LMP 05/20/2025   SpO2 99%   BMI 27.19 kg/m      "

## 2025-06-21 ENCOUNTER — RESULTS FOLLOW-UP (OUTPATIENT)
Dept: INTERNAL MEDICINE | Facility: CLINIC | Age: 50
End: 2025-06-21

## (undated) DEVICE — KIT ENDO TURNOVER/PROCEDURE W/CLEAN A SCOPE LINERS 103888

## (undated) RX ORDER — FENTANYL CITRATE 50 UG/ML
INJECTION, SOLUTION INTRAMUSCULAR; INTRAVENOUS
Status: DISPENSED
Start: 2024-02-13

## (undated) RX ORDER — ONDANSETRON 4 MG/1
TABLET, ORALLY DISINTEGRATING ORAL
Status: DISPENSED
Start: 2023-10-09